# Patient Record
Sex: MALE | Race: WHITE | NOT HISPANIC OR LATINO | Employment: OTHER | URBAN - METROPOLITAN AREA
[De-identification: names, ages, dates, MRNs, and addresses within clinical notes are randomized per-mention and may not be internally consistent; named-entity substitution may affect disease eponyms.]

---

## 2018-05-25 ENCOUNTER — OFFICE VISIT (OUTPATIENT)
Dept: FAMILY MEDICINE | Facility: CLINIC | Age: 58
End: 2018-05-25
Payer: COMMERCIAL

## 2018-05-25 VITALS
DIASTOLIC BLOOD PRESSURE: 74 MMHG | OXYGEN SATURATION: 97 % | TEMPERATURE: 97.8 F | WEIGHT: 187.4 LBS | BODY MASS INDEX: 25.38 KG/M2 | SYSTOLIC BLOOD PRESSURE: 132 MMHG | HEART RATE: 90 BPM | HEIGHT: 72 IN

## 2018-05-25 DIAGNOSIS — E78.5 HYPERLIPIDEMIA LDL GOAL <100: ICD-10-CM

## 2018-05-25 DIAGNOSIS — E11.40 TYPE 2 DIABETES MELLITUS WITH DIABETIC NEUROPATHY, WITHOUT LONG-TERM CURRENT USE OF INSULIN (H): Primary | ICD-10-CM

## 2018-05-25 DIAGNOSIS — N52.1 ERECTILE DYSFUNCTION DUE TO DISEASES CLASSIFIED ELSEWHERE: ICD-10-CM

## 2018-05-25 DIAGNOSIS — Z12.5 SPECIAL SCREENING FOR MALIGNANT NEOPLASM OF PROSTATE: ICD-10-CM

## 2018-05-25 DIAGNOSIS — I10 BENIGN ESSENTIAL HYPERTENSION: ICD-10-CM

## 2018-05-25 PROCEDURE — 99214 OFFICE O/P EST MOD 30 MIN: CPT | Performed by: FAMILY MEDICINE

## 2018-05-25 RX ORDER — AMLODIPINE AND BENAZEPRIL HYDROCHLORIDE 5; 20 MG/1; MG/1
CAPSULE ORAL
Qty: 90 CAPSULE | Refills: 3 | Status: SHIPPED | OUTPATIENT
Start: 2018-05-25 | End: 2019-06-30

## 2018-05-25 RX ORDER — GLIMEPIRIDE 1 MG/1
1 TABLET ORAL DAILY
Refills: 3 | COMMUNITY
Start: 2018-03-15 | End: 2018-05-25

## 2018-05-25 RX ORDER — AMLODIPINE AND BENAZEPRIL HYDROCHLORIDE 5; 20 MG/1; MG/1
CAPSULE ORAL
Refills: 1 | COMMUNITY
Start: 2018-03-15 | End: 2018-05-25

## 2018-05-25 RX ORDER — SITAGLIPTIN AND METFORMIN HYDROCHLORIDE 1000; 50 MG/1; MG/1
1 TABLET, FILM COATED ORAL 2 TIMES DAILY WITH MEALS
Qty: 180 TABLET | Refills: 3 | Status: SHIPPED | OUTPATIENT
Start: 2018-05-25 | End: 2019-06-30

## 2018-05-25 RX ORDER — ATORVASTATIN CALCIUM 80 MG/1
80 TABLET, FILM COATED ORAL EVERY MORNING
Refills: 1 | COMMUNITY
Start: 2018-03-15 | End: 2018-05-25

## 2018-05-25 RX ORDER — SITAGLIPTIN AND METFORMIN HYDROCHLORIDE 1000; 50 MG/1; MG/1
1 TABLET, FILM COATED ORAL 2 TIMES DAILY WITH MEALS
Refills: 0 | COMMUNITY
Start: 2018-01-17 | End: 2018-05-25

## 2018-05-25 RX ORDER — SILDENAFIL CITRATE 100 MG
TABLET ORAL
Refills: 5 | COMMUNITY
Start: 2017-12-15 | End: 2018-05-25

## 2018-05-25 RX ORDER — GLIMEPIRIDE 1 MG/1
1 TABLET ORAL DAILY
Qty: 90 TABLET | Refills: 3 | Status: SHIPPED | OUTPATIENT
Start: 2018-05-25 | End: 2019-07-02

## 2018-05-25 RX ORDER — TADALAFIL 10 MG/1
10 TABLET ORAL DAILY PRN
Qty: 12 TABLET | Refills: 3 | Status: SHIPPED | OUTPATIENT
Start: 2018-05-25 | End: 2018-12-26

## 2018-05-25 RX ORDER — ATORVASTATIN CALCIUM 80 MG/1
80 TABLET, FILM COATED ORAL EVERY MORNING
Qty: 90 TABLET | Refills: 3 | Status: SHIPPED | OUTPATIENT
Start: 2018-05-25 | End: 2019-06-30

## 2018-05-25 ASSESSMENT — PAIN SCALES - GENERAL: PAINLEVEL: NO PAIN (0)

## 2018-05-25 NOTE — MR AVS SNAPSHOT
After Visit Summary   5/25/2018    Amanuel Mccord    MRN: [id number]           Patient Information     Date Of Birth          1960        Visit Information        Provider Department      5/25/2018 4:30 PM Schoen, Gregory G, MD [facility]        Today's Diagnoses     Type 2 diabetes mellitus with diabetic neuropathy, without long-term current use of insulin (H)    -  1    Hyperlipidemia LDL goal <100        Benign essential hypertension        Erectile dysfunction due to diseases classified elsewhere        Special screening for malignant neoplasm of prostate           Follow-ups after your visit        Future tests that were ordered for you today     Open Future Orders        Priority Expected Expires Ordered    Lipid panel reflex to direct LDL Fasting Routine 4/25/2019 5/25/2019 5/25/2018    **A1C FUTURE 3mo Routine 8/23/2018 9/22/2018 5/25/2018    Albumin Random Urine Quantitative with Creat Ratio Routine 4/25/2019 5/25/2019 5/25/2018    Basic metabolic panel Routine  5/25/2019 5/25/2018    **ALT FUTURE 1yr Routine 4/25/2019 5/25/2019 5/25/2018    **TSH with free T4 reflex FUTURE 1yr Routine 4/25/2019 5/25/2019 5/25/2018            Who to contact     If you have questions or need follow up information about today's clinic visit or your schedule please contact [facility] directly at [phone].  Normal or non-critical lab and imaging results will be communicated to you by MyChart, letter or phone within 4 business days after the clinic has received the results. If you do not hear from us within 7 days, please contact the clinic through MyChart or phone. If you have a critical or abnormal lab result, we will notify you by phone as soon as possible.  Submit refill requests through [employer] or call your pharmacy and they will forward the refill request to us. Please allow 3 business days for your refill to be completed.          Additional Information About Your  Visit        Care EveryWhere ID     This is your Care EveryWhere ID. This could be used by other organizations to access your [place] medical records  [id number]        Your Vitals Were     Pulse Temperature Height Pulse Oximetry BMI (Body Mass Index)       90 97.8  F (36.6  C) (Temporal) 6' (1.829 m) 97% 25.42 kg/m2        Blood Pressure from Last 3 Encounters:   05/25/18 132/74    Weight from Last 3 Encounters:   05/25/18 187 lb 6.4 oz (85 kg)              We Performed the Following     Prostate spec antigen screen          Today's Medication Changes          These changes are accurate as of 5/25/18  6:15 PM.  If you have any questions, ask your nurse or doctor.               Start taking these medicines.        Dose/Directions    tadalafil 10 MG tablet   Commonly known as:  CIALIS   Used for:  Erectile dysfunction due to diseases classified elsewhere   Started by:  Schoen, Gregory G, MD        Dose:  10 mg   Take 1 tablet (10 mg) by mouth daily as needed prior to sex. Do not use with nitroglycerin, terazosin or doxazosin.   Quantity:  12 tablet   Refills:  3         Stop taking these medicines if you haven't already. Please contact your care team if you have questions.     VIAGRA 100 MG tablet   Generic drug:  sildenafil   Stopped by:  Schoen, Gregory G, MD                Where to get your medicines      These medications were sent to [address]  [address] [facility] [address]     Phone:  [phone]     amLODIPine-benazepril 5-20 MG per capsule    atorvastatin 80 MG tablet    glimepiride 1 MG tablet    JANUMET  MG per tablet    tadalafil 10 MG tablet                Primary Care Provider Office Phone # Fax #    Gregory G Schoen, [phone]       [address] [place] [address]        Equal Access to Services     KAREN HERNANDEZ AH: Noelle Lindsey, waaxda luqadaha, qaybta kaalmarya gloria, true hidalgo  laregla diaz. So [facility] [phone].    ATENCIÓN: Si habla wilbert, tiene a albarado disposición servicios gratuitos de asistencia lingüística. Gorge al [phone].    We comply with applicable federal civil rights laws and [place] laws. We do not discriminate on the basis of race, color, national origin, age, disability, sex, sexual orientation, or gender identity.            Thank you!     Thank you for choosing [facility]  for your care. Our goal is always to provide you with excellent care. Hearing back from our patients is one way we can continue to improve our services. Please take a few minutes to complete the written survey that you may receive in the mail after your visit with us. Thank you!             Your Updated Medication List - Protect others around you: Learn how to safely use, store and throw away your medicines at www.disposemymeds.org.          This list is accurate as of 5/25/18  6:15 PM.  Always use your most recent med list.                   Brand Name Dispense Instructions for use Diagnosis    amLODIPine-benazepril 5-20 MG per capsule    LOTREL    90 capsule    TAKE ONE CAPSULE BY MOUTH EVERY MORNING    Type 2 diabetes mellitus with diabetic neuropathy, without long-term current use of insulin (H)       atorvastatin 80 MG tablet    LIPITOR    90 tablet    Take 1 tablet (80 mg) by mouth every morning    Type 2 diabetes mellitus with diabetic neuropathy, without long-term current use of insulin (H)       glimepiride 1 MG tablet    AMARYL    90 tablet    Take 1 tablet (1 mg) by mouth daily    Type 2 diabetes mellitus with diabetic neuropathy, without long-term current use of insulin (H)       JANUMET  MG per tablet   Generic drug:  sitagliptin-metFORMIN     180 tablet    Take 1 tablet by mouth 2 times daily (with meals)    Type 2 diabetes mellitus with diabetic neuropathy, without long-term current use of insulin (H)       tadalafil 10 MG tablet    CIALIS    12 tablet    Take 1  tablet (10 mg) by mouth daily as needed prior to sex. Do not use with nitroglycerin, terazosin or doxazosin.    Erectile dysfunction due to diseases classified elsewhere

## 2018-05-25 NOTE — PROGRESS NOTES
"  SUBJECTIVE:   Amanuel Mccord is a 57 year old male who presents to clinic today for the following health issues:     Amanuel is seeing patients me this clinic transferring over from [facility] due to a change in insurance and the fact that they have been caring for his spouse years.  He states he was diagnosed with diabetes 2 years ago has been following up every 3-6 months with his provider in [place].  He denies any issues with low sugars on his current medications.  His last lab work that we can find through care everywhere were done on 8-2-17 and showed the following: A1c equals 6.7%, total cholesterol 145, HDL 60, LDL 61, TSH 1.28 and GFR greater than 60.  His only symptom that he describes now are on occasion he will have tingling in his toes and he has a history of some cross his fingers and toes which typically only cause symptoms in cold weather.  More recently he has been having some of the same tingling.  He does note that for period of 2 weeks he did not take his medication and then for the past 2 weeks have restarted them feeling that it would be best to be on his medications what he meant to see me so created a gap find that out.  He also notes he has intermittent difficulty maintaining erection has used Viagra successfully in the past.  He understands that this is no longer on his formulary and would like alternative consideration.  When necessary he required 100 mg of Viagra.    Diabetes Follow-up      Patient is checking blood sugars: not at all    Diabetic concerns: Tingly in toes     Symptoms of hypoglycemia (low blood sugar): none     Paresthesias (numbness or burning in feet) or sores: Yes      Date of last diabetic eye exam: a year ago    Hypertension Follow-up      Outpatient blood pressures are being checked at [facility].  Results \"tend to be high\".    Low Salt Diet: no added salt    BP Readings from Last 2 Encounters:   05/25/18 132/74     No results found for: A1C, LDL    Amount of " exercise or physical activity: 6-7 days/week     Problems taking medications regularly: No    Medication side effects: none    Diet: regular (no restrictions)    Current Outpatient Prescriptions   Medication Sig Dispense Refill     amLODIPine-benazepril (LOTREL) 5-20 MG per capsule TAKE ONE CAPSULE BY MOUTH EVERY MORNING 90 capsule 3     atorvastatin (LIPITOR) 80 MG tablet Take 1 tablet (80 mg) by mouth every morning 90 tablet 3     glimepiride (AMARYL) 1 MG tablet Take 1 tablet (1 mg) by mouth daily 90 tablet 3     JANUMET  MG per tablet Take 1 tablet by mouth 2 times daily (with meals) 180 tablet 3     tadalafil (CIALIS) 10 MG tablet Take 1 tablet (10 mg) by mouth daily as needed prior to sex. Do not use with nitroglycerin, terazosin or doxazosin. 12 tablet 3     VIAGRA 100 MG tablet TAKE ONE TABLET BY MOUTH ONCE DAILY AS NEEDED FOR ERECTILE DYSFUNCTION  5     [DISCONTINUED] amLODIPine-benazepril (LOTREL) 5-20 MG per capsule TAKE ONE CAPSULE BY MOUTH EVERY MORNING  1     [DISCONTINUED] atorvastatin (LIPITOR) 80 MG tablet Take 80 mg by mouth every morning  1     [DISCONTINUED] glimepiride (AMARYL) 1 MG tablet Take 1 mg by mouth daily  3     [DISCONTINUED] JANUMET  MG per tablet Take 1 tablet by mouth 2 times daily (with meals)  0     ROS:  Const; denies any weight loss or gain, no fevers chills or night sweats.  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  His last diabetic eye exam was over a year ago.  He has prescription glasses but rarely wears them, rather using cheaters for reading as necessary.  RESP: Negative  CVR: Negative  GI: No bowel issues and no symptoms to suggest hernia.  States he did the colon screening lab test that was normal last year.  MSK: neg  Neuro: tingling in toes.    OBJECTIVE:  /74 (Cuff Size: Adult Regular)  Pulse 90  Temp 97.8  F (36.6  C) (Temporal)  Ht 6' (1.829 m)  Wt 187  lb 6.4 oz (85 kg)  SpO2 97%  BMI 25.42 kg/m2  Alert and oriented, in no acute distress.  PERRL, EOMI, fundi are clear. TMs, nose, throat are all normal.  Left eye is with exotropia.  The neck is supple and free of adenopathy or masses, the thyroid is normal without enlargement or nodules.  Chest is clear to auscultation.  Heart is regular without murmurs, clicks or rubs.   Diabetic foot exam showed normal pulses, normal capillary refill and normal monofilament exam on both feet.  There were no open sores or lesions noted.    Outside labs from [facility]:  Hepatitis C screen negative  Diabetic labs including lipid profile as above.  Does not appear to have had prostate screening in the past.  I was unable to identify any stool fit test results.    ASSESSMENT:     Type 2 diabetes mellitus with diabetic neuropathy, without long-term current use of insulin (H)  Hyperlipidemia LDL goal <100  Benign essential hypertension  Erectile dysfunction due to diseases classified elsewhere  Special screening for malignant neoplasm of prostate    PLAN:  Discussed the plan for management of his diabetes.  I placed new prescription orders for all of his current diabetes medications as noted above and sent these to [facility] (per his request).  I will have him return fasting month for his routine diabetes labs in order to give him a full 6 weeks back on his medication before testing his A1c.  We discussed the importance of foot protection and management.  Will review his records further to see if he has had any recent cancer screening and address that.  We will contact me if a trial of Cialis is ineffective or if a higher dose is indicated.  He otherwise requests refills as needed that if it is effective for him.  Depending on his lab results and how well his diabetes is controlled in a month we will determine if I need to see him every 6 months or more frequently.    Electronically signed by Greg Schoen, MD

## 2018-07-03 DIAGNOSIS — Z12.5 SPECIAL SCREENING FOR MALIGNANT NEOPLASM OF PROSTATE: ICD-10-CM

## 2018-07-03 DIAGNOSIS — E78.5 HYPERLIPIDEMIA LDL GOAL <100: ICD-10-CM

## 2018-07-03 DIAGNOSIS — I10 BENIGN ESSENTIAL HYPERTENSION: ICD-10-CM

## 2018-07-03 DIAGNOSIS — E11.40 TYPE 2 DIABETES MELLITUS WITH DIABETIC NEUROPATHY, WITHOUT LONG-TERM CURRENT USE OF INSULIN (H): ICD-10-CM

## 2018-07-03 LAB
ANION GAP SERPL CALCULATED.3IONS-SCNC: 6 MMOL/L (ref 3–14)
BUN SERPL-MCNC: 15 MG/DL (ref 7–30)
CALCIUM SERPL-MCNC: 9.4 MG/DL (ref 8.5–10.1)
CHLORIDE SERPL-SCNC: 103 MMOL/L (ref 94–109)
CHOLEST SERPL-MCNC: 165 MG/DL
CO2 SERPL-SCNC: 29 MMOL/L (ref 20–32)
CREAT SERPL-MCNC: 0.68 MG/DL (ref 0.66–1.25)
CREAT UR-MCNC: 59 MG/DL
GFR SERPL CREATININE-BSD FRML MDRD: >90 ML/MIN/1.7M2
GLUCOSE SERPL-MCNC: 119 MG/DL (ref 70–99)
HBA1C MFR BLD: 6.7 % (ref 0–5.6)
HDLC SERPL-MCNC: 82 MG/DL
LDLC SERPL CALC-MCNC: 63 MG/DL
MICROALBUMIN UR-MCNC: 22 MG/L
MICROALBUMIN/CREAT UR: 37.67 MG/G CR (ref 0–17)
NONHDLC SERPL-MCNC: 83 MG/DL
POTASSIUM SERPL-SCNC: 4.9 MMOL/L (ref 3.4–5.3)
PSA SERPL-ACNC: 2.18 UG/L (ref 0–4)
SODIUM SERPL-SCNC: 138 MMOL/L (ref 133–144)
TRIGL SERPL-MCNC: 102 MG/DL
TSH SERPL DL<=0.005 MIU/L-ACNC: 0.96 MU/L (ref 0.4–4)

## 2018-07-03 PROCEDURE — 83036 HEMOGLOBIN GLYCOSYLATED A1C: CPT | Mod: QW | Performed by: FAMILY MEDICINE

## 2018-07-03 PROCEDURE — 84443 ASSAY THYROID STIM HORMONE: CPT | Performed by: FAMILY MEDICINE

## 2018-07-03 PROCEDURE — G0103 PSA SCREENING: HCPCS | Performed by: FAMILY MEDICINE

## 2018-07-03 PROCEDURE — 82043 UR ALBUMIN QUANTITATIVE: CPT | Performed by: FAMILY MEDICINE

## 2018-07-03 PROCEDURE — 80048 BASIC METABOLIC PNL TOTAL CA: CPT | Performed by: FAMILY MEDICINE

## 2018-07-03 PROCEDURE — 36415 COLL VENOUS BLD VENIPUNCTURE: CPT | Performed by: FAMILY MEDICINE

## 2018-07-03 PROCEDURE — 80061 LIPID PANEL: CPT | Performed by: FAMILY MEDICINE

## 2018-07-05 ENCOUNTER — TELEPHONE (OUTPATIENT)
Dept: FAMILY MEDICINE | Facility: CLINIC | Age: 58
End: 2018-07-05

## 2018-07-05 NOTE — TELEPHONE ENCOUNTER
----- Message from Gregory G Schoen, MD sent at 7/4/2018  8:49 AM CDT -----  Please inform Amanuel that his labs all look very good.  The one minor abnormality is a slight increase in urine albumin, but we will monitor that and not need to make any changes in his current medications.  The amlodipine-benazepril medication he is taking protects his kidneys so is getting proper treatment for this.  His thyroid function was normal, blood tests for kidney and liver were normal, cholesterol/lipids are well controlled and his PSA screen for prostate cancer was normal.  His diabetes is well controlled with his A1c being at 6.7% with a goal of less than 7.  No changes are needed.  I should see him every 6 months to monitor his diabetes and medications.  Electronically signed by Greg Schoen, MD

## 2018-12-26 ENCOUNTER — OFFICE VISIT (OUTPATIENT)
Dept: FAMILY MEDICINE | Facility: CLINIC | Age: 58
End: 2018-12-26
Payer: COMMERCIAL

## 2018-12-26 VITALS
DIASTOLIC BLOOD PRESSURE: 82 MMHG | BODY MASS INDEX: 27.26 KG/M2 | SYSTOLIC BLOOD PRESSURE: 120 MMHG | RESPIRATION RATE: 14 BRPM | WEIGHT: 201 LBS | HEART RATE: 100 BPM | OXYGEN SATURATION: 96 % | TEMPERATURE: 97.7 F

## 2018-12-26 DIAGNOSIS — I10 BENIGN ESSENTIAL HYPERTENSION: ICD-10-CM

## 2018-12-26 DIAGNOSIS — E11.40 TYPE 2 DIABETES MELLITUS WITH DIABETIC NEUROPATHY, WITHOUT LONG-TERM CURRENT USE OF INSULIN (H): Primary | ICD-10-CM

## 2018-12-26 DIAGNOSIS — N52.1 ERECTILE DYSFUNCTION DUE TO DISEASES CLASSIFIED ELSEWHERE: ICD-10-CM

## 2018-12-26 PROCEDURE — 99214 OFFICE O/P EST MOD 30 MIN: CPT | Performed by: FAMILY MEDICINE

## 2018-12-26 RX ORDER — SILDENAFIL 100 MG/1
100 TABLET, FILM COATED ORAL DAILY PRN
Qty: 4 TABLET | Refills: 3 | Status: SHIPPED | OUTPATIENT
Start: 2018-12-26 | End: 2020-02-07

## 2018-12-26 ASSESSMENT — PAIN SCALES - GENERAL: PAINLEVEL: NO PAIN (0)

## 2018-12-26 NOTE — PROGRESS NOTES
SUBJECTIVE:   Amanuel Mccord is a 58 year old male who presents to clinic today for the following health issues:    Diabetes Follow-up      Patient is checking blood sugars: rarely.  hasnt done it in the last few days    Diabetic concerns: None     Symptoms of hypoglycemia (low blood sugar): none     Paresthesias (numbness or burning in feet) or sores: No     Date of last diabetic eye exam:     BP Readings from Last 2 Encounters:   12/26/18 120/82   05/25/18 132/74     Hemoglobin A1C (%)   Date Value   07/03/2018 6.7 (H)   08/02/2017 6.7 (H)     LDL Cholesterol Calculated (mg/dL)   Date Value   07/03/2018 63   04/04/2017 61       Diabetes Management Resources    Amount of exercise or physical activity: 6-7 days/week for an average of 30-45 minutes    Problems taking medications regularly: No    Medication side effects: none    Diet: diabetic        PROBLEMS TO ADD ON...  Personal issue to discuss with PCP    Amanuel is here for diabetes and hypertension follow up. He states he his taking his meds as prescribed and finds no issues with side effects, other than wonders about medication contribution to erectile dysfunction and treatment of that.  He has been using up to 40 mg of cialis and not always getting the response he would like. He believes that he got better results from viagra but that is not covered and was up to about $50/pill for that to buy out of pocket. He several times notes that he does sometimes worry about peformance and acknowledges that this might also be playing into things. I asked him about stress and he feels that this is not a big problem for him. He is on the road a lot and mostly sees his wife on weekends and therefore there may be stress in that relationship and may not be forthcoming about that. Of note, his wife did see me last week for a routine visit and did express concern about him being very stressed lately and did not want me to mention that but asked that I explore it if the  conversation led to stress related issues.     He also notes that he is evolving a bit of a curvature of the tip of his penis when he gets an erection and from time to time will have some ejaculatory dysfunction.      Current Outpatient Medications   Medication Sig Dispense Refill     amLODIPine-benazepril (LOTREL) 5-20 MG per capsule TAKE ONE CAPSULE BY MOUTH EVERY MORNING 90 capsule 3     atorvastatin (LIPITOR) 80 MG tablet Take 1 tablet (80 mg) by mouth every morning 90 tablet 3     glimepiride (AMARYL) 1 MG tablet Take 1 tablet (1 mg) by mouth daily 90 tablet 3     JANUMET  MG per tablet Take 1 tablet by mouth 2 times daily (with meals) 180 tablet 3     sildenafil (VIAGRA) 100 MG tablet Take 1 tablet (100 mg) by mouth daily as needed 4 tablet 3     Social History     Socioeconomic History     Marital status:      Spouse name: None     Number of children: None     Years of education: None     Highest education level: None   Social Needs     Financial resource strain: None     Food insecurity - worry: None     Food insecurity - inability: None     Transportation needs - medical: None     Transportation needs - non-medical: None   Occupational History     None   Tobacco Use     Smoking status: Current Every Day Smoker     Types: Cigarettes     Smokeless tobacco: Never Used   Substance and Sexual Activity     Alcohol use: Yes     Drug use: No     Sexual activity: Yes   Other Topics Concern     Parent/sibling w/ CABG, MI or angioplasty before 65F 55M? Not Asked   Social History Narrative     None     ROS:  Const: neg  HEENT: neg  RESP: neg  CVR: neg  GI: neg  : as above  MSK: neg  Psych: denies stress, depression    OBJECTIVE:  /82 (BP Location: Left arm, Patient Position: Sitting, Cuff Size: Adult Regular)   Pulse 100   Temp 97.7  F (36.5  C) (Temporal)   Resp 14   Wt 91.2 kg (201 lb)   SpO2 96%   BMI 27.26 kg/m    Alert and oriented, in no acute distress.  PERRL, EOMI, fundi are clear.    The neck is supple and free of adenopathy or masses, the thyroid is normal without enlargement or nodules.  Chest is clear to auscultation.  Heart is regular without murmurs, clicks or rubs.   Abdomen soft, nontender, no masses  Extremities without edema.   MENTAL STATUS EXAM  Appearance: appropriate  Attitude: cooperative  Behavior: normal to being a bit manic/hyper with rapid speech  Eye Contact: normal  Speech: normal  Orientation: oriented to person , place, time   Mood:  normal  Affect: normal  Thought Process: clear    ASSESSMENT:     Type 2 diabetes mellitus with diabetic neuropathy, without long-term current use of insulin (H)  Benign essential hypertension  Erectile dysfunction due to diseases classified elsewhere      PLAN:  He will return fasting for blood work to monitor labs due for DM at this time.   In July he had a PSA done which was normal range at 2.18 and other labs were unremarkable at that time. His blood pressure was stable and no med changes are indicated.  We discussed options for ED, including paying out of pocket for viagra, trial of levitra, referral to urology and he would like a small Rx for viagra to see if works better.  We discussed that having diabetes and hypertension themselves can lead to ED, as well as the treatments we use for them, so at this point the recommendation is to manage his DM and HTN optimally and deal with the ED.  If still has issues despite viagra, particularly with the ejaculatory dysfunction, could consider a trial of bupropion.    Electronically signed by Greg Schoen, MD

## 2019-03-11 ENCOUNTER — TELEPHONE (OUTPATIENT)
Dept: FAMILY MEDICINE | Facility: CLINIC | Age: 59
End: 2019-03-11

## 2019-03-11 NOTE — LETTER
[address] [id number] [phone]        Amanuel CORTEZ Suri  [address] [address] [place] [id number]      March 11, 2019      Dear Amanuel,    I care about your health and have reviewed your health plan, including your medical conditions, medication list, and lab results and am making recommendations based on this review, to better manage your health.    You are in particular need of attention regarding:  -Diabetes  -Colon Cancer Screening    I am recommending that you:  -schedule a LAB ONLY APPOINTMENT to recheck your: diabetic labs within the next 1-4 weeks.  -schedule a COLONOSCOPY.  Colon cancer is now the second leading cause of cancer-related deaths in the [place] for both men and women.  There are over 130,000 new cases and 50,000 deaths per year from colon cancer.  A recent study, which included patients ages 55 to 79 found 50,400 [place] deaths from colorectal cancer could have been prevented if patients had undergone a colonoscopy in the previous 10 years.    If you have not had a colonoscopy, we encourage you to schedule by contacting us at [phone], Monday through Friday.  After hours, you may leave a message and we will return your call during normal business hours.      There is another option called a FIT test, if you don t wish to have a colonoscopy, which needs to be repeated every year.  It does replace the colonoscopy for colorectal cancer screening and can detect hidden bleeding in the lower colon.  If a positive result is obtained, you would be referred for a colonoscopy. Please discuss this option with your provider.      For patients under/uninsured, we recommend you contact the [employer] program. [employer] is a free colorectal cancer screening program that provides colonoscopies for eligible under/uninsured [place] men and women. If you are interested in receiving a free colonoscopy, please call [facility] at  [phone] (mention code [employer]) to see if you re eligible.     If you've had the preventative screening completed at another facility or feel you're not due for this screening, please call our clinic at the number listed above or send us a My Chart message so we can update our records. We would like to thank you in advance for taking the time to take care of your health.  If you have any questions, please don t hesitate to contact our clinic.    Sincerely,       Your [facility] Team

## 2019-03-11 NOTE — TELEPHONE ENCOUNTER
Panel Management Review      Patient has the following on his problem list:     Diabetes    ASA: Failed    Last A1C  Lab Results   Component Value Date    A1C 6.7 07/03/2018    A1C 6.7 08/02/2017    A1C 9.7 04/04/2017    A1C 8.0 12/06/2016    A1C 10.0 04/23/2015     A1C tested: Passed    Last LDL:    Lab Results   Component Value Date    CHOL 165 07/03/2018     Lab Results   Component Value Date    HDL 82 07/03/2018     Lab Results   Component Value Date    LDL 63 07/03/2018     Lab Results   Component Value Date    TRIG 102 07/03/2018     No results found for: CHOLHDLRATIO  Lab Results   Component Value Date    NHDL 83 07/03/2018       Is the patient on a Statin? YES             Is the patient on Aspirin? NO    Medications     HMG CoA Reductase Inhibitors     atorvastatin (LIPITOR) 80 MG tablet             Last three blood pressure readings:  BP Readings from Last 3 Encounters:   12/26/18 120/82   05/25/18 132/74       Date of last diabetes office visit: 12/26/2018     Tobacco History:     History   Smoking Status     Current Every Day Smoker     Types: Cigarettes   Smokeless Tobacco     Never Used         Hypertension   Last three blood pressure readings:  BP Readings from Last 3 Encounters:   12/26/18 120/82   05/25/18 132/74     Blood pressure: Passed    HTN Guidelines:  Age 18-59 BP range:  Less than 140/90  Age 60-85 with Diabetes:  Less than 140/90  Age 60-85 without Diabetes:  less than 150/90      Composite cancer screening  Chart review shows that this patient is due/due soon for the following Colonoscopy  Summary:    Patient is due/failing the following:   A1C, COLONOSCOPY and PHYSICAL    Action needed:   Patient needs office visit for physical. and Patient needs non-fasting lab only appointment. Patient needs a referral for a colonoscopy    Type of outreach:    Sent letter.    Questions for provider review:    None                                                                                                                                     Alicja Parson CMA ([facility]ONY)       Chart routed to Care Team .

## 2019-04-19 DIAGNOSIS — E78.5 HYPERLIPIDEMIA LDL GOAL <100: ICD-10-CM

## 2019-04-19 DIAGNOSIS — I10 BENIGN ESSENTIAL HYPERTENSION: ICD-10-CM

## 2019-04-19 DIAGNOSIS — E11.40 TYPE 2 DIABETES MELLITUS WITH DIABETIC NEUROPATHY, WITHOUT LONG-TERM CURRENT USE OF INSULIN (H): ICD-10-CM

## 2019-04-19 LAB
ALT SERPL W P-5'-P-CCNC: 39 U/L (ref 0–70)
ANION GAP SERPL CALCULATED.3IONS-SCNC: 6 MMOL/L (ref 3–14)
BUN SERPL-MCNC: 17 MG/DL (ref 7–30)
CALCIUM SERPL-MCNC: 8.7 MG/DL (ref 8.5–10.1)
CHLORIDE SERPL-SCNC: 105 MMOL/L (ref 94–109)
CO2 SERPL-SCNC: 27 MMOL/L (ref 20–32)
CREAT SERPL-MCNC: 0.77 MG/DL (ref 0.66–1.25)
CREAT UR-MCNC: 116 MG/DL
GFR SERPL CREATININE-BSD FRML MDRD: >90 ML/MIN/{1.73_M2}
GLUCOSE SERPL-MCNC: 120 MG/DL (ref 70–99)
HBA1C MFR BLD: 6.9 % (ref 0–5.6)
MICROALBUMIN UR-MCNC: 28 MG/L
MICROALBUMIN/CREAT UR: 24.4 MG/G CR (ref 0–17)
POTASSIUM SERPL-SCNC: 4.3 MMOL/L (ref 3.4–5.3)
SODIUM SERPL-SCNC: 138 MMOL/L (ref 133–144)

## 2019-04-19 PROCEDURE — 83036 HEMOGLOBIN GLYCOSYLATED A1C: CPT | Mod: QW | Performed by: FAMILY MEDICINE

## 2019-04-19 PROCEDURE — 82043 UR ALBUMIN QUANTITATIVE: CPT | Performed by: FAMILY MEDICINE

## 2019-04-19 PROCEDURE — 36415 COLL VENOUS BLD VENIPUNCTURE: CPT | Performed by: FAMILY MEDICINE

## 2019-04-19 PROCEDURE — 84460 ALANINE AMINO (ALT) (SGPT): CPT | Performed by: FAMILY MEDICINE

## 2019-04-19 PROCEDURE — 80048 BASIC METABOLIC PNL TOTAL CA: CPT | Performed by: FAMILY MEDICINE

## 2019-04-24 ENCOUNTER — TELEPHONE (OUTPATIENT)
Dept: FAMILY MEDICINE | Facility: CLINIC | Age: 59
End: 2019-04-24

## 2019-04-24 NOTE — TELEPHONE ENCOUNTER
----- Message from Gregory G Schoen, MD sent at 4/24/2019  5:28 PM CDT -----  Please inform Amanuel that has labs turned out well. His urine protein has improved and gone down from 37.67 to 24.4, which is just above normal of 17.  His kidney function, sodium/potassium/calcium were all normal and his blood sugar was only 120, with his A1c at 6.9% with target of being under 7.  No changes in his meds indicated and recommend a follow up in 6 months.   Electronically signed by Greg Schoen, MD

## 2019-06-30 DIAGNOSIS — E11.40 TYPE 2 DIABETES MELLITUS WITH DIABETIC NEUROPATHY, WITHOUT LONG-TERM CURRENT USE OF INSULIN (H): ICD-10-CM

## 2019-07-01 RX ORDER — SITAGLIPTIN AND METFORMIN HYDROCHLORIDE 1000; 50 MG/1; MG/1
TABLET, FILM COATED ORAL
Qty: 180 TABLET | Refills: 0 | Status: SHIPPED | OUTPATIENT
Start: 2019-07-01 | End: 2019-12-13

## 2019-07-01 RX ORDER — ATORVASTATIN CALCIUM 80 MG/1
TABLET, FILM COATED ORAL
Qty: 90 TABLET | Refills: 0 | Status: SHIPPED | OUTPATIENT
Start: 2019-07-01 | End: 2019-10-08

## 2019-07-01 RX ORDER — AMLODIPINE AND BENAZEPRIL HYDROCHLORIDE 5; 20 MG/1; MG/1
CAPSULE ORAL
Qty: 90 CAPSULE | Refills: 0 | Status: SHIPPED | OUTPATIENT
Start: 2019-07-01 | End: 2019-10-07

## 2019-07-01 NOTE — TELEPHONE ENCOUNTER
Juan Deigoumet  Last Written Prescription Date:  05/25/2018  Last Fill Quantity: 180,  # refills: 3   Last office visit: 12/26/2018 with prescribing provider:  Schoen Future Office Visit:   Next 5 appointments (look out 90 days)    Jul 12, 2019  2:50 PM CDT  Office Visit with Gregory G Schoen, MD  [address] [address]  [phone]      Prescription approved per [facility] Refill Protocol.    Lotrel  Last Written Prescription Date:  05/25/2018  Last Fill Quantity: 90,  # refills: 3   Last office visit: 12/26/2018 with prescribing provider:  Schoen Future Office Visit:   Next 5 appointments (look out 90 days)    Jul 12, 2019  2:50 PM CDT  Office Visit with Gregory G Schoen, MD  [facility] ([address] [address]  [phone]      Prescription approved per [facility] Refill Protocol.    Lipitor  Last Written Prescription Date:  05/25/2018  Last Fill Quantity: 90,  # refills: 3   Last office visit: 12/26/2018 with prescribing provider:  Schoen Future Office Visit:   Next 5 appointments (look out 90 days)    Jul 12, 2019  2:50 PM CDT  Office Visit with Gregory G Schoen, MD  [facility] ([address] [address]  [phone]      Prescription approved per [facility] Refill Protocol.    Requested Prescriptions   Pending Prescriptions Disp Refills     atorvastatin (LIPITOR) 80 MG tablet [Pharmacy Med Name: ATORVASTATIN CALCIUM 80MG TABS] 90 tablet 3     Sig: TAKE ONE TABLET BY MOUTH EVERY MORNING       Statins Protocol Passed - 6/30/2019 10:01 AM        Passed - LDL on file in past 12 months     Recent Labs   Lab Test 07/03/18  0859   LDL 63           Passed - No abnormal creatine kinase in past 12 months     No lab results found.         Passed - Recent (12 mo) or future (30 days) visit within the authorizing provider's specialty      "Patient had office visit in the last 12 months or has a visit in the next 30 days with authorizing provider or within the authorizing provider's specialty.  See \"Patient Info\" tab in inbasket, or \"Choose Columns\" in Meds & Orders section of the refill encounter.          Passed - Medication is active on med list        Passed - Patient is age 18 or older        amLODIPine-benazepril (LOTREL) 5-20 MG capsule [Pharmacy Med Name: AMLODIPINE BESY-BENAZEPRI 5-20 CAPS] 90 capsule 3     Sig: TAKE ONE CAPSULE BY MOUTH EVERY MORNING       Calcium Channel Blockers Protocol  Passed - 6/30/2019 10:01 AM        Passed - Blood pressure under 140/90 in past 12 months     BP Readings from Last 3 Encounters:   12/26/18 120/82   05/25/18 132/74           Passed - Recent (12 mo) or future (30 days) visit within the authorizing provider's specialty     Patient had office visit in the last 12 months or has a visit in the next 30 days with authorizing provider or within the authorizing provider's specialty.  See \"Patient Info\" tab in inbasket, or \"Choose Columns\" in Meds & Orders section of the refill encounter.          Passed - Medication is active on med list        Passed - Patient is age 18 or older        Passed - Normal serum creatinine on file in past 12 months     Recent Labs   Lab Test 04/19/19  0810   CR 0.77           JANUMET  MG tablet [Pharmacy Med Name: JANUMET 50-1000MG TABS] 180 tablet 3     Sig: TAKE ONE TABLET BY MOUTH TWICE A DAY WITH MEALS       Combination Oral Antihyperglycemic Agents Passed - 6/30/2019 10:01 AM        Passed - Blood pressure under 140/90 in past 12 months     BP Readings from Last 3 Encounters:   12/26/18 120/82   05/25/18 132/74           Passed - Patient has a documented LDL level within past 12 mos.     Recent Labs   Lab Test 07/03/18  0859   LDL 63           Passed - Patient has a documented Microalbumin level within past 12 mos.     Recent Labs   Lab Test 04/19/19  0816   MICROL 28 " "  UMALCR 24.40*           Passed - Patient has documented A1c within the specified period of time.     If HgbA1C is 8 or greater, it needs to be on file within the past 3 months.  If less than 8, must be on file within the past 6 months.   Recent Labs   Lab Test 04/19/19  0810   A1C 6.9*           Passed - Patient's CR is NOT>1.4 OR Patient's EGFR is NOT<45 within past 12 mos.     Recent Labs   Lab Test 04/19/19  0810   GFRESTIMATED >90   GFRESTBLACK >90     Recent Labs   Lab Test 04/19/19  0810   CR 0.77           Passed - Patient does not have a diagnosis of CHF.        Passed - Medication is active on med list        Passed - Patient is 18 years old or older.        Passed - Recent (6 mo) or future (30 days) visit within the authorizing provider's specialty     Patient had office visit in the last 6 months or has a visit in the next 30 days with authorizing provider or within the authorizing provider's specialty.  See \"Patient Info\" tab in inbasket, or \"Choose Columns\" in Meds & Orders section of the refill encounter.        Mary Ellen Greene RN   "

## 2019-07-02 DIAGNOSIS — E11.40 TYPE 2 DIABETES MELLITUS WITH DIABETIC NEUROPATHY, WITHOUT LONG-TERM CURRENT USE OF INSULIN (H): ICD-10-CM

## 2019-07-02 RX ORDER — GLIMEPIRIDE 1 MG/1
TABLET ORAL
Qty: 90 TABLET | Refills: 0 | Status: SHIPPED | OUTPATIENT
Start: 2019-07-02 | End: 2019-10-07

## 2019-07-02 NOTE — TELEPHONE ENCOUNTER
"Amaryl  Last Written Prescription Date:  05/25/2018  Last Fill Quantity: 90,  # refills: 3   Last office visit: 12/26/2018 with prescribing provider:  Schoen   Future Office Visit:   Next 5 appointments (look out 90 days)    Jul 12, 2019  2:50 PM CDT  Office Visit with Gregory G [facility]en, MD  [facility] [address] [place] [id number] [phone]      Prescription approved per [facility] Refill Protocol.    Requested Prescriptions   Pending Prescriptions Disp Refills     glimepiride (AMARYL) 1 MG tablet [Pharmacy Med Name: GLIMEPIRIDE 1MG TABS] 90 tablet 3     Sig: TAKE ONE TABLET BY MOUTH ONCE DAILY       Sulfonylurea Agents Failed - 7/2/2019 12:08 PM        Failed - Blood pressure less than 140/90 in past 6 months     BP Readings from Last 3 Encounters:   12/26/18 120/82   05/25/18 132/74           Passed - Patient has documented LDL within the past 12 mos.     Recent Labs   Lab Test 07/03/18  0859   LDL 63           Passed - Patient has had a Microalbumin in the past 15 mos.     Recent Labs   Lab Test 04/19/19  0816   MICROL 28   UMALCR 24.40*           Passed - Patient has documented A1c within the specified period of time.     If HgbA1C is 8 or greater, it needs to be on file within the past 3 months.  If less than 8, must be on file within the past 6 months.   Recent Labs   Lab Test 04/19/19  0810   A1C 6.9*           Passed - Medication is active on med list        Passed - Patient is age 18 or older        Passed - Patient has a recent creatinine (normal) within the past 12 mos.     Recent Labs   Lab Test 04/19/19  0810   CR 0.77           Passed - Recent (6 mo) or future (30 days) visit within the authorizing provider's specialty     Patient had office visit in the last 6 months or has a visit in the next 30 days with authorizing provider or within the authorizing provider's specialty.  See \"Patient Info\" tab in inbasket, or \"Choose Columns\" in Meds & " Orders section of the refill encounter.        Mary Ellen Greene RN

## 2019-07-12 ENCOUNTER — TELEPHONE (OUTPATIENT)
Dept: FAMILY MEDICINE | Facility: CLINIC | Age: 59
End: 2019-07-12

## 2019-07-12 DIAGNOSIS — E11.40 TYPE 2 DIABETES MELLITUS WITH DIABETIC NEUROPATHY, WITHOUT LONG-TERM CURRENT USE OF INSULIN (H): Primary | ICD-10-CM

## 2019-07-12 DIAGNOSIS — E11.40 TYPE 2 DIABETES MELLITUS WITH DIABETIC NEUROPATHY, WITHOUT LONG-TERM CURRENT USE OF INSULIN (H): ICD-10-CM

## 2019-07-12 LAB
HBA1C MFR BLD: 6.6 % (ref 0–5.6)
TSH SERPL DL<=0.005 MIU/L-ACNC: 0.85 MU/L (ref 0.4–4)

## 2019-07-12 PROCEDURE — 84443 ASSAY THYROID STIM HORMONE: CPT | Performed by: FAMILY MEDICINE

## 2019-07-12 PROCEDURE — 36415 COLL VENOUS BLD VENIPUNCTURE: CPT | Performed by: FAMILY MEDICINE

## 2019-07-12 PROCEDURE — 83036 HEMOGLOBIN GLYCOSYLATED A1C: CPT | Mod: QW | Performed by: FAMILY MEDICINE

## 2019-10-07 DIAGNOSIS — E11.40 TYPE 2 DIABETES MELLITUS WITH DIABETIC NEUROPATHY, WITHOUT LONG-TERM CURRENT USE OF INSULIN (H): ICD-10-CM

## 2019-10-07 RX ORDER — GLIMEPIRIDE 1 MG/1
TABLET ORAL
Qty: 90 TABLET | Refills: 0 | Status: SHIPPED | OUTPATIENT
Start: 2019-10-07 | End: 2019-12-13

## 2019-10-07 RX ORDER — AMLODIPINE AND BENAZEPRIL HYDROCHLORIDE 5; 20 MG/1; MG/1
CAPSULE ORAL
Qty: 90 CAPSULE | Refills: 0 | Status: SHIPPED | OUTPATIENT
Start: 2019-10-07 | End: 2019-12-13

## 2019-10-07 NOTE — TELEPHONE ENCOUNTER
"Glimepiride  Last Written Prescription Date:  07/02/2019  Last Fill Quantity: 90,  # refills: 0   Last office visit: 12/26/2018 with prescribing provider:  Schoen   Future Office Visit:   Next 5 appointments (look out 90 days)    Oct 25, 2019  4:10 PM CDT  Office Visit with Gregory G Schoen, MD  [facility] [address] [place] [address]  [phone]      Medication is being filled for 1 time refill only due to:  Upcoming appointment with PCP    Lotrel  Last Written Prescription Date:  07/01/2019  Last Fill Quantity: 90,  # refills: 0   Last office visit: 12/26/2018 with prescribing provider:  Schoen   Future Office Visit:   Next 5 appointments (look out 90 days)    Oct 25, 2019  4:10 PM CDT  Office Visit with Gregory G Schoen, MD  [facility] ([address] [address]  [phone]      Medication is being filled for 1 time refill only due to:  Upcoming appointment with PCP.     Requested Prescriptions   Pending Prescriptions Disp Refills     amLODIPine-benazepril (LOTREL) 5-20 MG capsule [Pharmacy Med Name: AMLODIPINE BESY-BENAZEPRI 5-20 CAPS] 90 capsule 0     Sig: TAKE ONE CAPSULE BY MOUTH EVERY MORNING       Calcium Channel Blockers Protocol  Passed - 10/7/2019  2:48 PM        Passed - Blood pressure under 140/90 in past 12 months     BP Readings from Last 3 Encounters:   12/26/18 120/82   05/25/18 132/74           Passed - Recent (12 mo) or future (30 days) visit within the authorizing provider's specialty     Patient has had an office visit with the authorizing provider or a provider within the authorizing providers department within the previous 12 mos or has a future within next 30 days. See \"Patient Info\" tab in inbasket, or \"Choose Columns\" in Meds & Orders section of the refill encounter.          Passed - Medication is active on med list        Passed - Patient is age 18 or older    " "    Passed - Normal serum creatinine on file in past 12 months     Recent Labs   Lab Test 04/19/19  0810   CR 0.77           glimepiride (AMARYL) 1 MG tablet [Pharmacy Med Name: GLIMEPIRIDE 1MG TABS] 90 tablet 0     Sig: TAKE ONE TABLET BY MOUTH ONCE DAILY       Sulfonylurea Agents Failed - 10/7/2019  2:48 PM        Failed - Blood pressure less than 140/90 in past 6 months     BP Readings from Last 3 Encounters:   12/26/18 120/82   05/25/18 132/74           Failed - Patient has documented LDL within the past 12 mos.     Recent Labs   Lab Test 07/03/18  0859   LDL 63           Passed - Patient has had a Microalbumin in the past 15 mos.     Recent Labs   Lab Test 04/19/19  0816   MICROL 28   UMALCR 24.40*           Passed - Patient has documented A1c within the specified period of time.     If HgbA1C is 8 or greater, it needs to be on file within the past 3 months.  If less than 8, must be on file within the past 6 months.   Recent Labs   Lab Test 07/12/19  1447   A1C 6.6*           Passed - Medication is active on med list        Passed - Patient is age 18 or older        Passed - Patient has a recent creatinine (normal) within the past 12 mos.     Recent Labs   Lab Test 04/19/19  0810   CR 0.77           Passed - Recent (6 mo) or future (30 days) visit within the authorizing provider's specialty     Patient had office visit in the last 6 months or has a visit in the next 30 days with authorizing provider or within the authorizing provider's specialty.  See \"Patient Info\" tab in inbasket, or \"Choose Columns\" in Meds & Orders section of the refill encounter.        Mary Ellen Greene RN   "

## 2019-10-08 DIAGNOSIS — E11.40 TYPE 2 DIABETES MELLITUS WITH DIABETIC NEUROPATHY, WITHOUT LONG-TERM CURRENT USE OF INSULIN (H): ICD-10-CM

## 2019-10-09 RX ORDER — ATORVASTATIN CALCIUM 80 MG/1
80 TABLET, FILM COATED ORAL EVERY MORNING
Qty: 90 TABLET | Refills: 0 | Status: SHIPPED | OUTPATIENT
Start: 2019-10-09 | End: 2019-12-13

## 2019-10-09 NOTE — TELEPHONE ENCOUNTER
"Lipitor  Last Written Prescription Date:  07/01/2019  Last Fill Quantity: 90,  # refills: 0   Last office visit: 12/26/2018 with prescribing provider:  Schoen   Future Office Visit:   Next 5 appointments (look out 90 days)    Oct 25, 2019  4:10 PM CDT  Office Visit with Gregory G [facility]oen, MD  [facility] [address] [place] [id number] [phone]      Medication is being filled for 1 time refill only due to:  Upcoming appointment with PCP.     Requested Prescriptions   Pending Prescriptions Disp Refills     atorvastatin (LIPITOR) 80 MG tablet [Pharmacy Med Name: ATORVASTATIN CALCIUM 80MG TABS] 90 tablet 0     Sig: TAKE ONE TABLET BY MOUTH EVERY MORNING       Statins Protocol Failed - 10/8/2019  7:31 PM        Failed - LDL on file in past 12 months     Recent Labs   Lab Test 07/03/18  0859   LDL 63           Passed - No abnormal creatine kinase in past 12 months     No lab results found.         Passed - Recent (12 mo) or future (30 days) visit within the authorizing provider's specialty     Patient has had an office visit with the authorizing provider or a provider within the authorizing providers department within the previous 12 mos or has a future within next 30 days. See \"Patient Info\" tab in inbasket, or \"Choose Columns\" in Meds & Orders section of the refill encounter.          Passed - Medication is active on med list        Passed - Patient is age 18 or older      Mary Ellen Greene RN   "

## 2019-12-13 ENCOUNTER — OFFICE VISIT (OUTPATIENT)
Dept: FAMILY MEDICINE | Facility: CLINIC | Age: 59
End: 2019-12-13
Payer: COMMERCIAL

## 2019-12-13 VITALS
WEIGHT: 202 LBS | TEMPERATURE: 97.4 F | RESPIRATION RATE: 16 BRPM | DIASTOLIC BLOOD PRESSURE: 74 MMHG | HEART RATE: 89 BPM | HEIGHT: 71 IN | OXYGEN SATURATION: 99 % | SYSTOLIC BLOOD PRESSURE: 132 MMHG | BODY MASS INDEX: 28.28 KG/M2

## 2019-12-13 DIAGNOSIS — I10 BENIGN ESSENTIAL HYPERTENSION: ICD-10-CM

## 2019-12-13 DIAGNOSIS — E78.5 HYPERLIPIDEMIA LDL GOAL <100: ICD-10-CM

## 2019-12-13 DIAGNOSIS — M25.511 CHRONIC RIGHT SHOULDER PAIN: ICD-10-CM

## 2019-12-13 DIAGNOSIS — E11.40 TYPE 2 DIABETES MELLITUS WITH DIABETIC NEUROPATHY, WITHOUT LONG-TERM CURRENT USE OF INSULIN (H): Primary | ICD-10-CM

## 2019-12-13 DIAGNOSIS — N48.6 PEYRONIE'S SYNDROME: ICD-10-CM

## 2019-12-13 DIAGNOSIS — G89.29 CHRONIC RIGHT SHOULDER PAIN: ICD-10-CM

## 2019-12-13 DIAGNOSIS — N52.1 ERECTILE DYSFUNCTION DUE TO DISEASES CLASSIFIED ELSEWHERE: ICD-10-CM

## 2019-12-13 PROCEDURE — 99214 OFFICE O/P EST MOD 30 MIN: CPT | Performed by: FAMILY MEDICINE

## 2019-12-13 PROCEDURE — 99207 C FOOT EXAM  NO CHARGE: CPT | Performed by: FAMILY MEDICINE

## 2019-12-13 RX ORDER — GLIMEPIRIDE 1 MG/1
1 TABLET ORAL DAILY
Qty: 90 TABLET | Refills: 0 | Status: SHIPPED | OUTPATIENT
Start: 2019-12-13 | End: 2020-06-19

## 2019-12-13 RX ORDER — ATORVASTATIN CALCIUM 80 MG/1
80 TABLET, FILM COATED ORAL EVERY MORNING
Qty: 90 TABLET | Refills: 0 | Status: SHIPPED | OUTPATIENT
Start: 2019-12-13 | End: 2020-06-19

## 2019-12-13 RX ORDER — AMLODIPINE AND BENAZEPRIL HYDROCHLORIDE 5; 20 MG/1; MG/1
1 CAPSULE ORAL DAILY
Qty: 90 CAPSULE | Refills: 0 | Status: SHIPPED | OUTPATIENT
Start: 2019-12-13 | End: 2020-06-19

## 2019-12-13 ASSESSMENT — MIFFLIN-ST. JEOR: SCORE: 1753.4

## 2019-12-13 ASSESSMENT — PAIN SCALES - GENERAL: PAINLEVEL: SEVERE PAIN (7)

## 2019-12-13 NOTE — PROGRESS NOTES
Subjective     Amanuel Mccord is a 59 year old male who presents to clinic today for the following health issues:    HPI   Diabetes Follow-up      How often are you checking your blood sugar? About every 3 weeks. Says was checking more frequently and they were fine so checks less.  No symptoms of low sugars.     What concerns do you have today about your diabetes? None     Do you have any of these symptoms? (Select all that apply)  No numbness or tingling in feet.  No redness, sores or blisters on feet.  No complaints of excessive thirst.  No reports of blurry vision.  No significant changes to weight.     Have you had a diabetic eye exam in the last 12 months? No    BP Readings from Last 2 Encounters:   12/13/19 132/74   12/26/18 120/82     Hemoglobin A1C (%)   Date Value   07/12/2019 6.6 (H)   04/19/2019 6.9 (H)     LDL Cholesterol Calculated (mg/dL)   Date Value   07/03/2018 63   04/04/2017 61       Diabetes Management Resources    Has concerns with right shoulder pain for about 10 years.  No formal work up at any point. History of lots of sports including baseball/throwing and boxing (however is left hand dominant). Pain on top of shoulder with abduction and flexion. No weakness at this point.  Does physical work and getting to the point of wanting it to be better.  States he is using up to 10 to 12 tablets of Aleve daily for relief and is strongly advised to not take more than half of this amount.  Not  a work related issue.      Also has been having issues with ED and Peyronie's syndrome, noting it is unsatisfactory for him. Cialis does help to some degree but states the tip of the penis seems to remain flaccid and therefore is difficult to have intercourse due to that as well as the bend/deviation in the tip.      He also is due for a number of HM needs but does not want to address any of those today.      Current Outpatient Medications   Medication Sig Dispense Refill     amLODIPine-benazepril (LOTREL)  "5-20 MG capsule Take 1 capsule by mouth daily 90 capsule 0     atorvastatin (LIPITOR) 80 MG tablet Take 1 tablet (80 mg) by mouth every morning Must keep upcoming appointment for refills. 90 tablet 0     glimepiride (AMARYL) 1 MG tablet Take 1 tablet (1 mg) by mouth daily 90 tablet 0     sitagliptin-metFORMIN (JANUMET)  MG tablet Take 1 tablet by mouth 2 times daily (with meals) 180 tablet 0     sildenafil (VIAGRA) 100 MG tablet Take 1 tablet (100 mg) by mouth daily as needed (Patient not taking: Reported on 12/13/2019) 4 tablet 3           Review of Systems   ROS COMP: CONSTITUTIONAL: NEGATIVE for fever, chills, change in weight  INTEGUMENTARY/SKIN: NEGATIVE for worrisome rashes, moles or lesions  EYES: NEGATIVE for vision changes or irritation  EYES: has not had an eye exam for some time but does plan to do that soon. 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    ENT/MOUTH: NEGATIVE for ear, mouth and throat problems  RESP: NEGATIVE for significant cough or SOB  CV: NEGATIVE for chest pain, palpitations or peripheral edema  GI: NEGATIVE for nausea, abdominal pain, heartburn, or change in bowel habits   male as above  MUSCULOSKELETAL: as above regarding right shoulder.  Also notes prior left elbow \"Westley Jaxson\" surgery and left hip replacement.   NEURO: NEGATIVE for weakness, dizziness or paresthesias  ENDOCRINE: see above regarding diabetes  HEME: NEGATIVE for bleeding problems  PSYCHIATRIC: NEGATIVE for changes in mood or affect      Objective    /74 (Cuff Size: Adult Large)   Pulse 89   Temp 97.4  F (36.3  C) (Temporal)   Resp 16   Ht 1.803 m (5' 11\")   Wt 91.6 kg (202 lb)   SpO2 99%   BMI 28.17 kg/m    Body mass index is 28.17 kg/m .  Physical Exam   Alert and oriented, in no acute distress.  PERRL, left eye is \"lazy\" and does not have conjugate gaze.  Appears to have cataract development of left eye " with right eye being more clear with normal fundus.   TMs, nose, throat all normal.   The neck is supple and free of adenopathy or masses, the thyroid is normal without enlargement or nodules.  Chest is clear to auscultation.  Heart is regular without murmurs, clicks or rubs.   Abdomen - Abdomen soft, non-tender. BS normal. No masses, organomegaly  Did not do a genital exam.   Right shoulder exam shows no asymmetry.  He has tenderness to palpation over the anterior and mid aspect of the deltoid.  He describes discomfort along the back of the neck in the trapezius area but there is no spasm to palpation in this region.  There is no apprehension with stressing the joint.  He has markedly decreased internal and external rotation but it is symmetric with his left shoulder which is also quite limited.  He has full range of motion and strength of his elbow and wrist.  He has 5/5 strength in the shoulder region but indicates discomfort with resistance.  Foot exam shows no lesions and normal sensation.    Lab work is pending.    ASSESSMENT:   Type 2 diabetes mellitus with diabetic neuropathy, without long-term current use of insulin (H)  Erectile dysfunction due to diseases classified elsewhere  Peyronie's syndrome  Chronic right shoulder pain  Benign essential hypertension  Hyperlipidemia LDL goal <100    PLAN:  We discussed his diabetes management and he is aware that there is some risk with not checking his sugars frequently but he is clinically not having any signs of hypoglycemia.  Past A1c levels have been satisfactory and he will return fasting for his annual diabetes blood work to include A1c, basic profile, urine microalbumin, TSH and lipid profile.     He requests referral to urology regarding his erectile dysfunction and Peyronie's syndrome and has been scheduled with Dr. Ware in the near future and however for further evaluation.    He also requested consultation with orthopedics for his right shoulder.  His  "symptoms are nonspecific and may have chronic rotator cuff issues.  Referral has been made per his request and I deferred any imaging to be ordered by orthopedics.    His blood pressure is at target today on his current medications and no changes indicated.  He will be returning to the above lab work as noted.  Similarly he is tolerating his statin without side effects and will return for blood work as above with an additional ALT to assess his liver function.    We discussed numerous health maintenance needs and he declines vaccines including flu shot and pneumococcal vaccine at this time.  We discussed colon cancer screening and he struggles with that but then says he promises he will have that done in 2020.  He is not interested in \"pooping in a box or card\" as an alternative means of testing.  He says we can further discuss these things when he comes back for recheck in 2020.  I recommended a six-month follow-up unless his blood work dictates otherwise.    Electronically signed by Greg Schoen, MD          "

## 2019-12-20 DIAGNOSIS — E11.40 TYPE 2 DIABETES MELLITUS WITH DIABETIC NEUROPATHY, WITHOUT LONG-TERM CURRENT USE OF INSULIN (H): ICD-10-CM

## 2019-12-20 DIAGNOSIS — E78.5 HYPERLIPIDEMIA LDL GOAL <100: ICD-10-CM

## 2019-12-20 DIAGNOSIS — I10 BENIGN ESSENTIAL HYPERTENSION: ICD-10-CM

## 2019-12-20 LAB
ALT SERPL W P-5'-P-CCNC: 41 U/L (ref 0–70)
ANION GAP SERPL CALCULATED.3IONS-SCNC: 3 MMOL/L (ref 3–14)
BUN SERPL-MCNC: 18 MG/DL (ref 7–30)
CALCIUM SERPL-MCNC: 9.5 MG/DL (ref 8.5–10.1)
CHLORIDE SERPL-SCNC: 105 MMOL/L (ref 94–109)
CHOLEST SERPL-MCNC: 178 MG/DL
CO2 SERPL-SCNC: 30 MMOL/L (ref 20–32)
CREAT SERPL-MCNC: 0.74 MG/DL (ref 0.66–1.25)
CREAT UR-MCNC: 94 MG/DL
GFR SERPL CREATININE-BSD FRML MDRD: >90 ML/MIN/{1.73_M2}
GLUCOSE SERPL-MCNC: 186 MG/DL (ref 70–99)
HBA1C MFR BLD: 6.7 % (ref 0–5.6)
HDLC SERPL-MCNC: 72 MG/DL
LDLC SERPL CALC-MCNC: 73 MG/DL
MICROALBUMIN UR-MCNC: 28 MG/L
MICROALBUMIN/CREAT UR: 29.89 MG/G CR (ref 0–17)
NONHDLC SERPL-MCNC: 106 MG/DL
POTASSIUM SERPL-SCNC: 4.6 MMOL/L (ref 3.4–5.3)
SODIUM SERPL-SCNC: 138 MMOL/L (ref 133–144)
TRIGL SERPL-MCNC: 166 MG/DL
TSH SERPL DL<=0.005 MIU/L-ACNC: 1.43 MU/L (ref 0.4–4)

## 2019-12-20 PROCEDURE — 80061 LIPID PANEL: CPT | Performed by: FAMILY MEDICINE

## 2019-12-20 PROCEDURE — 84443 ASSAY THYROID STIM HORMONE: CPT | Performed by: FAMILY MEDICINE

## 2019-12-20 PROCEDURE — 80048 BASIC METABOLIC PNL TOTAL CA: CPT | Performed by: FAMILY MEDICINE

## 2019-12-20 PROCEDURE — 36415 COLL VENOUS BLD VENIPUNCTURE: CPT | Performed by: FAMILY MEDICINE

## 2019-12-20 PROCEDURE — 84460 ALANINE AMINO (ALT) (SGPT): CPT | Performed by: FAMILY MEDICINE

## 2019-12-20 PROCEDURE — 82043 UR ALBUMIN QUANTITATIVE: CPT | Performed by: FAMILY MEDICINE

## 2019-12-20 PROCEDURE — 83036 HEMOGLOBIN GLYCOSYLATED A1C: CPT | Mod: QW | Performed by: FAMILY MEDICINE

## 2020-01-06 ENCOUNTER — NURSE TRIAGE (OUTPATIENT)
Dept: NURSING | Facility: CLINIC | Age: 60
End: 2020-01-06

## 2020-01-06 ENCOUNTER — ANCILLARY PROCEDURE (OUTPATIENT)
Dept: GENERAL RADIOLOGY | Facility: CLINIC | Age: 60
End: 2020-01-06
Attending: PHYSICIAN ASSISTANT
Payer: COMMERCIAL

## 2020-01-06 ENCOUNTER — OFFICE VISIT (OUTPATIENT)
Dept: ORTHOPEDICS | Facility: CLINIC | Age: 60
End: 2020-01-06
Payer: COMMERCIAL

## 2020-01-06 VITALS
BODY MASS INDEX: 28.28 KG/M2 | SYSTOLIC BLOOD PRESSURE: 138 MMHG | HEIGHT: 71 IN | WEIGHT: 202 LBS | DIASTOLIC BLOOD PRESSURE: 74 MMHG

## 2020-01-06 DIAGNOSIS — G56.21 IRRITATION OF RIGHT ULNAR NERVE: ICD-10-CM

## 2020-01-06 DIAGNOSIS — S46.211A BICEPS RUPTURE, PROXIMAL, RIGHT, INITIAL ENCOUNTER: Primary | ICD-10-CM

## 2020-01-06 DIAGNOSIS — S40.011A CONTUSION OF RIGHT SHOULDER, INITIAL ENCOUNTER: ICD-10-CM

## 2020-01-06 DIAGNOSIS — M25.519 SHOULDER PAIN: ICD-10-CM

## 2020-01-06 PROCEDURE — 73030 X-RAY EXAM OF SHOULDER: CPT | Mod: TC

## 2020-01-06 PROCEDURE — 99204 OFFICE O/P NEW MOD 45 MIN: CPT | Performed by: PHYSICIAN ASSISTANT

## 2020-01-06 RX ORDER — MELOXICAM 15 MG/1
15 TABLET ORAL DAILY
Qty: 30 TABLET | Refills: 1 | Status: SHIPPED | OUTPATIENT
Start: 2020-01-06 | End: 2020-01-16 | Stop reason: ALTCHOICE

## 2020-01-06 RX ORDER — CYCLOBENZAPRINE HCL 5 MG
5-10 TABLET ORAL 3 TIMES DAILY PRN
Qty: 60 TABLET | Refills: 0 | Status: SHIPPED | OUTPATIENT
Start: 2020-01-06 | End: 2020-02-11

## 2020-01-06 ASSESSMENT — PAIN SCALES - GENERAL: PAINLEVEL: WORST PAIN (10)

## 2020-01-06 ASSESSMENT — MIFFLIN-ST. JEOR: SCORE: 1753.4

## 2020-01-06 NOTE — PROGRESS NOTES
ORTHOPEDIC CONSULT      Chief Complaint: Amanuel Mccord is a 59 year old left hand dominant male who recently retired but previous to this he worked at [employer].  He enjoys fishing.    He is being seen for   Chief Complaints and History of Present Illnesses   Patient presents with     Consult     rt shoulder pain           History of Present Illness:   Mechanism of Injury: Patient slipped on the ice yesterday, 1/5/2020 and fell on his outstretched right arm.  Location: Left anterior shoulder  Duration of Pain: Since yesterday  Rating of Pain: 10 out of 10 per the patient  Pain Quality: Achy  Pain is better with: Resting the arm and keeping it not moving  Pain is worse with: Movement especially abduction  Treatment so far consists of: Aleve, rest.   Associated Features: Right shoulder pain anterior with also some numbness and tingling in the ulnar nerve distribution.  Prior history of related problems: Patient states he has had a bad right shoulder for approximately 15 years.  He states that it has been much worse in the last 5 years.  Patient admits that he has been able to use his arm to do stuff above shoulder level and away from his body however he has had difficulty with internal rotation over the last several years.  Pain is Limiting: Motion of right shoulder.  Here to: Orthopedic Consult   The Pain Has: Been about the same  Additional History: Patient as mentioned above has had shoulder problems for about 15 years.  No major surgery.  Patient has never had an on his right shoulder.      Patient's past medical, surgical, social and family histories reviewed.     No past medical history on file.     No past surgical history on file.    Medications:  amLODIPine-benazepril (LOTREL) 5-20 MG capsule, Take 1 capsule by mouth daily  atorvastatin (LIPITOR) 80 MG tablet, Take 1 tablet (80 mg) by mouth every morning Must keep upcoming appointment for refills.  glimepiride (AMARYL) 1 MG tablet, Take 1 tablet (1 mg)  "by mouth daily  sildenafil (VIAGRA) 100 MG tablet, Take 1 tablet (100 mg) by mouth daily as needed (Patient not taking: Reported on 12/13/2019)  sitagliptin-metFORMIN (JANUMET)  MG tablet, Take 1 tablet by mouth 2 times daily (with meals)    No current facility-administered medications on file prior to visit.       No Known Allergies    Social History     Occupational History     Not on file   Tobacco Use     Smoking status: Current Every Day Smoker     Types: Cigarettes     Smokeless tobacco: Never Used   Substance and Sexual Activity     Alcohol use: Yes     Drug use: No     Sexual activity: Yes       Family History   Problem Relation Age of Onset     Hypertension Mother      Hyperlipidemia Mother      Hyperlipidemia Father      Hypertension Father      Diabetes Brother      Diabetes Sister        REVIEW OF SYSTEMS  10 point review systems performed otherwise negative as noted as per history of present illness.    Physical Exam:  Vitals: /74   Ht 1.803 m (5' 11\")   Wt 91.6 kg (202 lb)   BMI 28.17 kg/m    BMI= Body mass index is 28.17 kg/m .    Constitutional: healthy, alert and no acute distress but not wanting to use his right arm.  Patient is very hyperverbal today.  Psychiatric: mentation appears normal and affect normal/bright  NEURO: no focal deficits, CMS intact right lower extremity except for some decreased sensation and numbness and tingling in the ulnar nerve distribution.  RESP: Normal with easy respirations and no use of accessory muscles to breathe, no audible wheezing or retractions  CV: +2 radial pulse and his hand is warm to palpation.   SKIN: Patient has significant swelling over the anterior shoulder about the proximal biceps tendon and ecchymosis just inferior to the proximal biceps tendon.  Other areas of the shoulder no erythema, rashes, excoriation, or breakdown. No evidence of infection.   MUSCULOSKELETAL:    INSPECTION of right shoulder: No gross deformities, erythema, " edema, ecchymosis, atrophy or fasciculations.  I do not feel that he has Ramez deformity.    PALPATION: Significant tenderness to palpation over the anterior shoulder over the proximal biceps tendon.  No tenderness to palpation of the AC joint, clavicle, lateral shoulder, posterior shoulder, trapezius area. No increased warmth noted.     ROM: Patient was very guarded with his range of motion today.  I was able to have the patient do some Codman's.  Patient could do approximately 10 degrees of abduction.  Patient did approximately 10 degrees of external rotation and was not able to do much for internal rotation.  Forward flexion to about 20 degrees.  Patient was too painful to do much more range of motion.  I could tell that the shoulder could move I felt it was located.    STRENGTH: 5 out of 5 , interosseous and thumb strength without pain.  negative 4 out of 5 internal and external rotation compared to 5 out of 5 on the contralateral side.    SPECIAL TEST: Patient was unable to do Neer test, unable to do Arambula sign, unable to do cross over test, ne unable to do gative belly press and unable to do liftoff test, neg unable to do ative empty can and full can test, negative external rotator lag sign, unable to do drop test.  No Ramez deformity.  GAIT: non-antalgic  Lymph: no palpable lymph nodes    Diagnostic Modalities:  X-rays done today scapular Y and Grashey.  Patient unable to do the axillary view.  No fracture no dislocation no tumor.  I do believe the glenohumeral joint is located even though I do not have an axillary view.  There is no degenerative joint disease of the glenohumeral joint.  The AC joint has slight degenerative joint disease but minimal.    Independent visualization of the images was performed.    Impression: 1.  1 day status post right shoulder possible proximal biceps tear.  2.  1 day status post right shoulder contusion.  3.  1 day status post right ulnar nerve irritation    Plan:  All  of the above pertinent physical exam and imaging modalities findings was reviewed with Amanuel and his wife.                                          CONSERVATIVE CARE:    Patient Instructions:  1.  X-ray: Your x-rays look good.  No fracture no dislocation no tumor.  It looks like you have good joint space.  It does look like your shoulder is located.  We were not able to get an axillary view which would tell us that better but overall the rest of the knees look good.  2.  Exam: Your exam is limited secondary to pain.  The main thing we notice is anterior shoulder pain and tenderness as well as a lot of bruising and ecchymosis and swelling around her proximal biceps area.  3.  Rest: Right now this shoulder is very irritated and the main thing we want to do is rest it.  Ice, elevation and decreased activity.  4.  Medication: We will try some Mobic as well as Flexeril to help you sleep at night and get comfortable.  We did talk about narcotic medication but the [facility] has cracked down on this.  I would much rather have you on an anti-inflammatory because you are having a lot of inflammation than a medication that will mask the pain.  5.  Therapy: Right now I just want you doing simple Codman's exercises which are dangling exercises which I demonstrated today.  I also try and work on range of motion of your fingers wrist and elbow 3 times a day.  Right now I just wanted to easy exercises as the pain feels better I have more stretching you can do listed below.  6.  MRI: If it is a proximal biceps tear we do not necessarily need an MRI.  It is difficult to get a good exam to ascertain if we need an MRI at this point.  I would recommend letting the shoulder calm down before you are seen again.  This way we can get a better exam.  7.  Proximal biceps tear: If this is a proximal biceps tear oftentimes it can be treated nonoperatively and you do not lose all that much strength or function by just letting it heal.  This is also  your nondominant arm which is good.  8.  Sleeping: I would suggest sleeping in a recliner with a lot of pillows so you do not roll over.  Get sleep also I gave you a muscle relaxant to help with this.    9.  Ulnar nerve: More than likely this nerve is irritated second to Sea to the contusion or may be some swelling.  This should get better with time just keep an eye on it.    10.  Appointment with Dr. Edmond: I would recommend waiting another week before you are seen just so that he is able to get a better exam.  It sounded like he really wanted to see him but to get more out of the exam I would wait a week.  11. Follow up with Sameer Ross PA-C in 4 weeks or on an as needed basis.   Re-x-ray on return: Yes, could do an Axillary view if he can tolerate it on the next visit.     BP Readings from Last 1 Encounters:   01/06/20 138/74       BP noted to be well controlled today in office.      Patient does use Tobacco products. Patient not ready to quit at this time.  Strongly encouraged smoking cessation.  Risks of smoking and benefits of quitting were discussed.  Information offered: AVS with information about stopping smoking and advised to discuss smoking cessation medications/strategies with Primary care provider.  3-5 Minutes were spent in counseling.    This note was dictated with [employer].    Sameer Ross PA-C

## 2020-01-06 NOTE — PATIENT INSTRUCTIONS
Encounter Diagnoses   Name Primary?     Possible Biceps rupture, proximal, right, initial encounter Yes     Contusion of right shoulder, initial encounter      Irritation of right ulnar nerve      Rest, ice and elevate above heart level as needed for pain control  1.  X-ray: Your x-rays look good.  No fracture no dislocation no tumor.  It looks like you have good joint space.  It does look like your shoulder is located.  We were not able to get an axillary view which would tell us that better but overall the rest of the knees look good.  2.  Exam: Your exam is limited secondary to pain.  The main thing we notice is anterior shoulder pain and tenderness as well as a lot of bruising and ecchymosis and swelling around her proximal biceps area.  3.  Rest: Right now this shoulder is very irritated and the main thing we want to do is rest it.  Ice, elevation and decreased activity.  4.  Medication: We will try some Mobic as well as Flexeril to help you sleep at night and get comfortable.  We did talk about narcotic medication but the [facility] has cracked down on this.  I would much rather have you on an anti-inflammatory because you are having a lot of inflammation than a medication that will mask the pain.  5.  Therapy: Right now I just want you doing simple Codman's exercises which are dangling exercises which I demonstrated today.  I also try and work on range of motion of your fingers wrist and elbow 3 times a day.  Right now I just wanted to easy exercises as the pain feels better I have more stretching you can do listed below.  6.  MRI: If it is a proximal biceps tear we do not necessarily need an MRI.  It is difficult to get a good exam to ascertain if we need an MRI at this point.  I would recommend letting the shoulder calm down before you are seen again.  This way we can get a better exam.  7.  Proximal biceps tear: If this is a proximal biceps tear oftentimes it can be treated nonoperatively and you do not lose all  that much strength or function by just letting it heal.  This is also your nondominant arm which is good.  8.  Sleeping: I would suggest sleeping in a recliner with a lot of pillows so you do not roll over.  Get sleep also I gave you a muscle relaxant to help with this.    9.  Ulnar nerve: More than likely this nerve is irritated second to Sea to the contusion or may be some swelling.  This should get better with time just keep an eye on it.    10.  Appointment with Dr. Edmond: I would recommend waiting another week before you are seen just so that he is able to get a better exam.  It sounded like he really wanted to see him but to get more out of the exam I would wait a week.  11. Follow up with Sameer Ross PA-C in 4 weeks or on an as needed basis.       [employer] and [employer] may offer reliable information regarding your diagnosis and treatment plan.    THANK YOU for coming in today. If you receive a survey via haystagg or mail please let us know if there was anything you especially appreciated today or if there is any way we can improve our clinic. We appreciate your input.    GENERAL INFORMATION:  Our hours are:  (Pending)    [facility] for any issues or concerns: [phone]      We are not in the office Thursdays. Therefore non- urgent calls and medical messages received on Thursday will be addressed when we are back in the office on Wednesday. Urgent matters will be reviewed and addressed by one of our partners in the office as needed.    If lab work was done today as part of your evaluation you will generally be contacted via haystagg, mail, or phone with the results within 1-5 days. If there is an alarming result we will contact you by phone. Lab results come back at varying times, I generally wait until all labs are resulted before making comments on results. Please note labs are automatically released to haystagg (if you have signed up for it) once available-at times you may  see these prior to my having a chance to review them as well.    If you need refills please contact your pharmacist. They will send a refill request to me to review. Please allow 3 business days for us to process all refill requests. All narcotic refills should be handled in the clinic at the time of your visit.   Patient Education     Exercises for Shoulder Flexibility: Pendulum Exercise      Improving your flexibility can reduce pain. Stretching exercises also can help increase your range of pain-free motion. Breathe normally when you exercise. And try to use smooth, fluid movements.  Follow any special instructions you are given. If you feel pain, stop the exercise. If the pain continues after stopping, call your healthcare provider.  Here are the steps for the pendulum exercise:     Lean over with your good arm supported on a table or chair.    Relax the arm on the painful side, letting it hang straight down.    Slowly begin to swing the relaxed arm. Move it in a small [place], gradually making it bigger if you can. Then reverse the direction. Next, move it backward and forward. Finally, move it side to side.     Note: Spend about 5 minutes doing the exercise, 3 times a day. Change direction after 1 minute of motion.   Date Last Reviewed: 12/1/2017 2000-2019 [employer]. [address] [place] [address], [place] [address]. All rights reserved. This information is not intended as a substitute for professional medical care. Always follow your healthcare professional's instructions.      WAIT TO START THESE ONCE YOUR SHOULDER IS CALMED DOWN:    Exercises for Shoulder Flexibility: External Rotation  This stretch can help restore shoulder flexibility and relieve pain over time. When stretching, be sure to breathe deeply. Follow any special instructions from your doctor or physical therapist:     a doorway. Grasp the doorjamb with the hand on the frozen side. Your arm should be bent.    With the other  hand, hold the elbow on the frozen side firmly against your body.    Standing in the same spot, rotate your body away from the doorjamb. Stop when you feel the stretch in the shoulder. At first, try to hold the stretch for 5 seconds.    Work up to doing 3 sets of this stretch, 3 times a day. Work up to holding the stretch for 30 to 60 seconds.  Note: Keep your arms as still as you can. Over time, rotate your body a little more to enhance the stretch. But be careful not to twist your back.        Exercises for Shoulder Flexibility: Internal Rotation    This stretch can help restore shoulder flexibility and relieve pain over time. When stretching, be sure to breathe deeply. Follow any special instructions from your healthcare provider or physical therapist.    While seated, move the arm on the side you want to stretch toward the middle of your back. The palm of your hand should face out.    Cup your other hand under the hand that s behind your back. Gently push your cupped hand upward until you feel the stretch in the shoulder. Try to hold the stretch for 5 seconds.    Work up to doing 3 sets of this stretch, 3 times a day. Work up to holding the stretch for 30 to 60 seconds.  Note: Keep your back straight. It s OK if your hand can t reach the middle of your back. Instead, start the stretch with your hand as close as you can get it to the middle of your back.      Exercises for Shoulder Flexibility: Wall Walk  Improving your flexibility can reduce pain. Stretching exercises also can help increase your range of pain-free motion. Breathe normally when you exercise. Use smooth, fluid movements.  Note: Follow any special instructions you are given. If you feel pain, stop the exercise. If the pain continues after stopping, call your healthcare provider:    Stand with your shoulder about 2 feet from the wall.    Raise your arm to shoulder level and gently  walk  your fingers up the wall as high as you can.    Hold for a few  seconds. Then walk your fingers back down.    Repeat 3 times. Move closer to the wall as you repeat.    Build up to holding each stretch for 30 seconds.  Caution: Do this stretch only if your healthcare provider recommends it. Don t do it when you are first injured.            7384-7077 [employer]. [address] [place] [address], [place] [address]. All rights reserved. This information is not intended as a substitute for professional medical care. Always follow your healthcare professional's instructions.

## 2020-01-06 NOTE — TELEPHONE ENCOUNTER
Amanuel calling to inquire if he can see ortho provider Dr. Edmond today instead of this coming Thursday?  Fell over weekend, believes he dislocated his shoulder.  Not willing to go to ED.   Advised to call back when clinic open, unable to access provider's schedule to advise which location Dr. Edmond in today.      Reason for Disposition    Requesting regular office appointment    Protocols used: INFORMATION ONLY CALL-A-AH

## 2020-01-06 NOTE — LETTER
1/6/2020         RE: Amanuel Mccord  [address]  [facility] [address]        Dear Colleague,    Thank you for referring your patient, Amanuel Mccord, to the [facility]. Please see a copy of my visit note below.    ORTHOPEDIC CONSULT      Chief Complaint: Amanuel Mccord is a 59 year old left hand dominant male who recently retired but previous to this he worked at [employer].  He enjoys fishing.    He is being seen for   Chief Complaints and History of Present Illnesses   Patient presents with     Consult     rt shoulder pain           History of Present Illness:   Mechanism of Injury: Patient slipped on the ice yesterday, 1/5/2020 and fell on his outstretched right arm.  Location: Left anterior shoulder  Duration of Pain: Since yesterday  Rating of Pain: 10 out of 10 per the patient  Pain Quality: Achy  Pain is better with: Resting the arm and keeping it not moving  Pain is worse with: Movement especially abduction  Treatment so far consists of: Aleve, rest.   Associated Features: Right shoulder pain anterior with also some numbness and tingling in the ulnar nerve distribution.  Prior history of related problems: Patient states he has had a bad right shoulder for approximately 15 years.  He states that it has been much worse in the last 5 years.  Patient admits that he has been able to use his arm to do stuff above shoulder level and away from his body however he has had difficulty with internal rotation over the last several years.  Pain is Limiting: Motion of right shoulder.  Here to: Orthopedic Consult   The Pain Has: Been about the same  Additional History: Patient as mentioned above has had shoulder problems for about 15 years.  No major surgery.  Patient has never had an on his right shoulder.      Patient's past medical, surgical, social and family histories reviewed.     No past medical history on file.     No past surgical history on  "file.    Medications:  amLODIPine-benazepril (LOTREL) 5-20 MG capsule, Take 1 capsule by mouth daily  atorvastatin (LIPITOR) 80 MG tablet, Take 1 tablet (80 mg) by mouth every morning Must keep upcoming appointment for refills.  glimepiride (AMARYL) 1 MG tablet, Take 1 tablet (1 mg) by mouth daily  sildenafil (VIAGRA) 100 MG tablet, Take 1 tablet (100 mg) by mouth daily as needed (Patient not taking: Reported on 12/13/2019)  sitagliptin-metFORMIN (JANUMET)  MG tablet, Take 1 tablet by mouth 2 times daily (with meals)    No current facility-administered medications on file prior to visit.       No Known Allergies    Social History     Occupational History     Not on file   Tobacco Use     Smoking status: Current Every Day Smoker     Types: Cigarettes     Smokeless tobacco: Never Used   Substance and Sexual Activity     Alcohol use: Yes     Drug use: No     Sexual activity: Yes       Family History   Problem Relation Age of Onset     Hypertension Mother      Hyperlipidemia Mother      Hyperlipidemia Father      Hypertension Father      Diabetes Brother      Diabetes Sister        REVIEW OF SYSTEMS  10 point review systems performed otherwise negative as noted as per history of present illness.    Physical Exam:  Vitals: /74   Ht 1.803 m (5' 11\")   Wt 91.6 kg (202 lb)   BMI 28.17 kg/m     BMI= Body mass index is 28.17 kg/m .    Constitutional: healthy, alert and no acute distress but not wanting to use his right arm.  Patient is very hyperverbal today.  Psychiatric: mentation appears normal and affect normal/bright  NEURO: no focal deficits, CMS intact right lower extremity except for some decreased sensation and numbness and tingling in the ulnar nerve distribution.  RESP: Normal with easy respirations and no use of accessory muscles to breathe, no audible wheezing or retractions  CV: +2 radial pulse and his hand is warm to palpation.   SKIN: Patient has significant swelling over the anterior shoulder " about the proximal biceps tendon and ecchymosis just inferior to the proximal biceps tendon.  Other areas of the shoulder no erythema, rashes, excoriation, or breakdown. No evidence of infection.   MUSCULOSKELETAL:    INSPECTION of right shoulder: No gross deformities, erythema, edema, ecchymosis, atrophy or fasciculations.  I do not feel that he has Ramez deformity.    PALPATION: Significant tenderness to palpation over the anterior shoulder over the proximal biceps tendon.  No tenderness to palpation of the AC joint, clavicle, lateral shoulder, posterior shoulder, trapezius area. No increased warmth noted.     ROM: Patient was very guarded with his range of motion today.  I was able to have the patient do some Codman's.  Patient could do approximately 10 degrees of abduction.  Patient did approximately 10 degrees of external rotation and was not able to do much for internal rotation.  Forward flexion to about 20 degrees.  Patient was too painful to do much more range of motion.  I could tell that the shoulder could move I felt it was located.    STRENGTH: 5 out of 5 , interosseous and thumb strength without pain.  negative 4 out of 5 internal and external rotation compared to 5 out of 5 on the contralateral side.    SPECIAL TEST: Patient was unable to do Neer test, unable to do Arambula sign, unable to do cross over test, ne unable to do gative belly press and unable to do liftoff test, neg unable to do ative empty can and full can test, negative external rotator lag sign, unable to do drop test.  No Ramez deformity.  GAIT: non-antalgic  Lymph: no palpable lymph nodes    Diagnostic Modalities:  X-rays done today scapular Y and Grashey.  Patient unable to do the axillary view.  No fracture no dislocation no tumor.  I do believe the glenohumeral joint is located even though I do not have an axillary view.  There is no degenerative joint disease of the glenohumeral joint.  The AC joint has slight degenerative  joint disease but minimal.    Independent visualization of the images was performed.    Impression: 1.  1 day status post right shoulder possible proximal biceps tear.  2.  1 day status post right shoulder contusion.  3.  1 day status post right ulnar nerve irritation    Plan:  All of the above pertinent physical exam and imaging modalities findings was reviewed with Amanuel and his wife.                                          CONSERVATIVE CARE:    Patient Instructions:  1.  X-ray: Your x-rays look good.  No fracture no dislocation no tumor.  It looks like you have good joint space.  It does look like your shoulder is located.  We were not able to get an axillary view which would tell us that better but overall the rest of the knees look good.  2.  Exam: Your exam is limited secondary to pain.  The main thing we notice is anterior shoulder pain and tenderness as well as a lot of bruising and ecchymosis and swelling around her proximal biceps area.  3.  Rest: Right now this shoulder is very irritated and the main thing we want to do is rest it.  Ice, elevation and decreased activity.  4.  Medication: We will try some Mobic as well as Flexeril to help you sleep at night and get comfortable.  We did talk about narcotic medication but the [facility] has cracked down on this.  I would much rather have you on an anti-inflammatory because you are having a lot of inflammation than a medication that will mask the pain.  5.  Therapy: Right now I just want you doing simple Codman's exercises which are dangling exercises which I demonstrated today.  I also try and work on range of motion of your fingers wrist and elbow 3 times a day.  Right now I just wanted to easy exercises as the pain feels better I have more stretching you can do listed below.  6.  MRI: If it is a proximal biceps tear we do not necessarily need an MRI.  It is difficult to get a good exam to ascertain if we need an MRI at this point.  I would recommend letting the  shoulder calm down before you are seen again.  This way we can get a better exam.  7.  Proximal biceps tear: If this is a proximal biceps tear oftentimes it can be treated nonoperatively and you do not lose all that much strength or function by just letting it heal.  This is also your nondominant arm which is good.  8.  Sleeping: I would suggest sleeping in a recliner with a lot of pillows so you do not roll over.  Get sleep also I gave you a muscle relaxant to help with this.    9.  Ulnar nerve: More than likely this nerve is irritated second to [place] to the contusion or may be some swelling.  This should get better with time just keep an eye on it.    10.  Appointment with Dr. Edmond: I would recommend waiting another week before you are seen just so that he is able to get a better exam.  It sounded like he really wanted to see him but to get more out of the exam I would wait a week.  11. Follow up with Sameer Ross PA-C in 4 weeks or on an as needed basis.   Re-x-ray on return: Yes, could do an Axillary view if he can tolerate it on the next visit.     BP Readings from Last 1 Encounters:   01/06/20 138/74       BP noted to be well controlled today in office.      Patient does use Tobacco products. Patient not ready to quit at this time.  Strongly encouraged smoking cessation.  Risks of smoking and benefits of quitting were discussed.  Information offered: AVS with information about stopping smoking and advised to discuss smoking cessation medications/strategies with Primary care provider.  3-5 Minutes were spent in counseling.    This note was dictated with [employer].    Sameer Ross PA-C          Again, thank you for allowing me to participate in the care of your patient.        Sincerely,        Sameer Ross PA-C

## 2020-01-09 ENCOUNTER — OFFICE VISIT (OUTPATIENT)
Dept: UROLOGY | Facility: OTHER | Age: 60
End: 2020-01-09
Payer: COMMERCIAL

## 2020-01-09 ENCOUNTER — OFFICE VISIT (OUTPATIENT)
Dept: ORTHOPEDICS | Facility: OTHER | Age: 60
End: 2020-01-09
Payer: COMMERCIAL

## 2020-01-09 VITALS
SYSTOLIC BLOOD PRESSURE: 146 MMHG | WEIGHT: 202 LBS | HEIGHT: 71 IN | BODY MASS INDEX: 28.28 KG/M2 | DIASTOLIC BLOOD PRESSURE: 68 MMHG

## 2020-01-09 VITALS — HEART RATE: 105 BPM | OXYGEN SATURATION: 96 % | DIASTOLIC BLOOD PRESSURE: 93 MMHG | SYSTOLIC BLOOD PRESSURE: 144 MMHG

## 2020-01-09 DIAGNOSIS — I10 BENIGN ESSENTIAL HYPERTENSION: ICD-10-CM

## 2020-01-09 DIAGNOSIS — N48.6 PEYRONIE'S DISEASE: ICD-10-CM

## 2020-01-09 DIAGNOSIS — S49.91XA INJURY OF RIGHT SHOULDER, INITIAL ENCOUNTER: Primary | ICD-10-CM

## 2020-01-09 DIAGNOSIS — M25.511 CHRONIC RIGHT SHOULDER PAIN: ICD-10-CM

## 2020-01-09 DIAGNOSIS — G89.29 CHRONIC RIGHT SHOULDER PAIN: ICD-10-CM

## 2020-01-09 DIAGNOSIS — S46.211A RUPTURE OF RIGHT PROXIMAL BICEPS TENDON, INITIAL ENCOUNTER: ICD-10-CM

## 2020-01-09 PROCEDURE — 99214 OFFICE O/P EST MOD 30 MIN: CPT | Performed by: ORTHOPAEDIC SURGERY

## 2020-01-09 PROCEDURE — 99244 OFF/OP CNSLTJ NEW/EST MOD 40: CPT | Performed by: UROLOGY

## 2020-01-09 RX ORDER — DICLOFENAC SODIUM 75 MG/1
75 TABLET, DELAYED RELEASE ORAL 2 TIMES DAILY PRN
Qty: 30 TABLET | Refills: 1 | Status: ON HOLD | OUTPATIENT
Start: 2020-01-09 | End: 2020-02-24

## 2020-01-09 ASSESSMENT — MIFFLIN-ST. JEOR: SCORE: 1753.4

## 2020-01-09 ASSESSMENT — PAIN SCALES - GENERAL: PAINLEVEL: EXTREME PAIN (9)

## 2020-01-09 NOTE — PROGRESS NOTES
S: Patient is a pleasant 59-year-old  male who is request be seen by Dr. Gregory Schoen with regard to patient's erectile dysfunction and penile curvature.  Patient has had problem with erectile dysfunction for a number of years.  He has tried oral medications in the past.  Previously they seem to work well however lately results are not good.  He has very soft erection not firm enough from it for penetration.  He also noticed a curvature upward with erections.  He has mild issues with penetration from the curvature.  He has a long history of diabetes, high blood pressure, and smoking.  Current Outpatient Medications   Medication Sig Dispense Refill     amLODIPine-benazepril (LOTREL) 5-20 MG capsule Take 1 capsule by mouth daily 90 capsule 0     atorvastatin (LIPITOR) 80 MG tablet Take 1 tablet (80 mg) by mouth every morning Must keep upcoming appointment for refills. 90 tablet 0     glimepiride (AMARYL) 1 MG tablet Take 1 tablet (1 mg) by mouth daily 90 tablet 0     meloxicam (MOBIC) 15 MG tablet Take 1 tablet (15 mg) by mouth daily 30 tablet 1     sitagliptin-metFORMIN (JANUMET)  MG tablet Take 1 tablet by mouth 2 times daily (with meals) 180 tablet 0     cyclobenzaprine (FLEXERIL) 5 MG tablet Take 1-2 tablets (5-10 mg) by mouth 3 times daily as needed for muscle spasms (Patient not taking: Reported on 1/9/2020) 60 tablet 0     sildenafil (VIAGRA) 100 MG tablet Take 1 tablet (100 mg) by mouth daily as needed (Patient not taking: Reported on 12/13/2019) 4 tablet 3     No Known Allergies  No past medical history on file.  No past surgical history on file.   Family History   Problem Relation Age of Onset     Hypertension Mother      Hyperlipidemia Mother      Hyperlipidemia Father      Hypertension Father      Diabetes Brother      Diabetes Sister      Social History     Socioeconomic History     Marital status:      Spouse name: None     Number of children: None     Years of education: None      Highest education level: None   Occupational History     None   Social Needs     Financial resource strain: None     Food insecurity:     Worry: None     Inability: None     Transportation needs:     Medical: None     Non-medical: None   Tobacco Use     Smoking status: Current Every Day Smoker     Types: Cigarettes     Smokeless tobacco: Never Used   Substance and Sexual Activity     Alcohol use: Yes     Drug use: No     Sexual activity: Yes   Lifestyle     Physical activity:     Days per week: None     Minutes per session: None     Stress: None   Relationships     Social connections:     Talks on phone: None     Gets together: None     Attends Congregation service: None     Active member of club or organization: None     Attends meetings of clubs or organizations: None     Relationship status: None     Intimate partner violence:     Fear of current or ex partner: None     Emotionally abused: None     Physically abused: None     Forced sexual activity: None   Other Topics Concern     Parent/sibling w/ CABG, MI or angioplasty before 65F 55M? Not Asked   Social History Narrative     None       REVIEW OF SYSTEMS  =================  C: NEGATIVE for fever, chills, change in weight  I: NEGATIVE for worrisome rashes, moles or lesions  E/M: NEGATIVE for ear, mouth and throat problems  R: NEGATIVE for significant cough or SHORTNESS OF BREATH  CV:  NEGATIVE for chest pain, palpitations or peripheral edema  GI: NEGATIVE for nausea, abdominal pain, heartburn, or change in bowel habits  NEURO: NEGATIVE numbness/weakness  : see HPI  PSYCH: NEGATIVE depression/anxiety  LYmph: no new enlarged lymph nodes  Ortho: no new trauma/movements      Physical Exam:  BP (!) 144/93   Pulse 105   SpO2 96%    Patient is pleasant, in no acute distress, good general condition.  Heart:  negative, PMI normal  Lung: no evidence of respiratory distress    Abdomen: Soft, nondistended, non tender. No masses. No rebound or guarding.   Exam: penis  with dorsal plague.  Testis no masses.  No scrotal skin lesion.  No suprapubic fullness.  Skin: Warm and dry.  No redness.  Neuro: grossly normal  Musculaskeletal: moving all extremities  Psych normal mood and affect  Musculoskeletal  moving all extremities  Hematologic/Lymphatic/Immunologic: normal ant/post cervical, axillary, supraclavicular and inguinal nodes    Assessment/Plan:   Pleasant 59-year-old gentleman with erectile dysfunction and also Perroney disease.  Treatment options discussed at length.  We discussed about vacuum pump, medications, penile injection, and lastly penile prosthesis for his erectile dysfunction.  Patient is interested in penile injection.  I will schedule patient to return for a trial.  Next we discussed etiology of his penile curvature.  Treatment options again discussed which include observation, penile injection and lastly penile prosthesis.    HTN: Amaunel to follow up with Primary Care provider regarding elevated blood pressure.

## 2020-01-09 NOTE — LETTER
"    1/9/2020         RE: Amanuel Mccord  [address]  [facility] [address]        Dear Colleague,    Thank you for referring your patient, Amanuel Mccord, to the [facility]. Please see a copy of my visit note below.    ORTHOPEDIC CONSULT      Chief Complaint: Amanuel Mccord is a 59 year old male who is being seen for   Chief Complaint   Patient presents with     Shoulder Pain     right shoulder pain     Consult       History of Present Illness:   Was being seen by YADIEL Singh now establishing with us.   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 Normally a mild intermittent dull ache, has been worse over the last 6 months. However 4-5 days earlier, fell on the ice, and \"brooks\" the shoulder causing a more acute, sharp, severe pain to the shoulder. Has been unable to raise the shoulder over his head, and has been weak. Also having some anterior shoulder pain, bruising along the proximal bicipital grove and a \"ernestina\" looking arm. No numbness/tingling. No radiating symptoms. The loss of motion and strength is new.   Treatments tried: time, activity modification, rest, aleve (\"12 a day\"), alcohol, icing,. Mobic (60mg a day), muscle relaxer all without benefit        Patient's past medical, surgical, social and family histories reviewed.     No past medical history on file.    No past surgical history on file.    Medications:  amLODIPine-benazepril (LOTREL) 5-20 MG capsule, Take 1 capsule by mouth daily  atorvastatin (LIPITOR) 80 MG tablet, Take 1 tablet (80 mg) by mouth every morning Must keep upcoming appointment for refills.  glimepiride (AMARYL) 1 MG tablet, Take 1 tablet (1 mg) by mouth daily  meloxicam (MOBIC) 15 MG tablet, Take 1 tablet (15 mg) by mouth daily  sitagliptin-metFORMIN (JANUMET)  MG tablet, Take 1 tablet by mouth 2 times daily (with " "meals)  cyclobenzaprine (FLEXERIL) 5 MG tablet, Take 1-2 tablets (5-10 mg) by mouth 3 times daily as needed for muscle spasms (Patient not taking: Reported on 1/9/2020)  sildenafil (VIAGRA) 100 MG tablet, Take 1 tablet (100 mg) by mouth daily as needed (Patient not taking: Reported on 12/13/2019)    No current facility-administered medications on file prior to visit.       No Known Allergies    Social History     Occupational History     Not on file   Tobacco Use     Smoking status: Current Every Day Smoker     Types: Cigarettes     Smokeless tobacco: Never Used   Substance and Sexual Activity     Alcohol use: Yes     Drug use: No     Sexual activity: Yes       Family History   Problem Relation Age of Onset     Hypertension Mother      Hyperlipidemia Mother      Hyperlipidemia Father      Hypertension Father      Diabetes Brother      Diabetes Sister        REVIEW OF SYSTEMS  10 point review systems performed otherwise negative as noted as per history of present illness.    Physical Exam:  Vitals: BP (!) 146/68 (BP Location: Left arm, Patient Position: Chair, Cuff Size: Adult Regular)   Ht 1.803 m (5' 11\")   Wt 91.6 kg (202 lb)   BMI 28.17 kg/m     BMI= Body mass index is 28.17 kg/m .  Constitutional: healthy, alert and no acute distress   Psychiatric: mentation appears normal and affect normal/bright  NEURO: no focal deficits  RESP: Normal with easy respirations and no use of accessory muscles to breathe, no audible wheezing or retractions  CV: RUE:  no edema         Regular rate and rhythm by palpation  SKIN: No erythema, rashes, excoriation, or breakdown. No evidence of infection.   JOINT/EXTREMITIES:right shoulder: ernestina deformity to the right arm, bruising along proximal bicipital grove. Tender to this area. No AC joint tenderness. No atrophy, no other deformity. No effusion.  AROM 60/50/20/side pocket. PROM did not tolerated past 90/90 due to pain. Soft endpoint. Unable to test rotator cuff due to lack of " motion and pain limiting exam. No distal bicipital tenderness, negative hook exam.  Motion to elbow intact.  Distal neurovascular grossly intact   Lymph: no appreciated lymphedema  GAIT: not tested     Diagnostic Modalities:  right shoulder X-ray: Well preserved glenohumeral articular space. Degenerative changes to the AC joint.  No fractures visualized. No fracture, dislocation and or lesion.   Independent visualization of the images was performed.      Impression: right shoulder acute on chronic pain  Proximal bicep tendon rupture  Right shoulder injury with acute onset weakness and lack of motion    Plan:  All of the above pertinent physical exam and imaging modalities findings was reviewed with Amanuel.  Has struggled with progressively worsening right shoulder pain since an injury 15 years ago, then fell on the ice 5 days ago and acute worsening of the symptoms, has loss motion to the shoulder, and strength. Exam was limited due to the lack of AROM and pain but has probable rotator cuff tear with bicep tendon rupture. Discussed options he would like to proceed with an MRI. This is reasonable.   Discussed medication, he was taking way too much Aleve, then prescribed mobic and taking too much of this. Was given muscle relaxer but other than feeling tired did not help pain. He would like to try a different NSAID. Rx for voltaren was given. Also had sung discussion about taking too much NSAIDs and risks to GI bleed, stomach, kidneys.  Referral for MRI provided.     I have prescribed an antiinflammatory medication.  We discussed that it is the same class of some the common over the counter medications (Ibuprofen, Advil, Motrin, Aleve, Naproxen, and  Naprosyn). I recommend to avoid taking these OTC's medication when taking the medication that I prescribed. This medication should be stopped if having stomach issues, bleeding, high blood pressure and/or chest pain.     Return to clinic to discuss test results, or  sooner as needed for changes.  Re-x-ray on return: No    Scribed by:  GAYATHRI Vaughan, CNP  1:37 PM  1/9/2020  The information in this document, created by a scribe for me, accurately reflects the services I personally performed and the decisions made by me. I have reviewed and approved this document for accuracy    Hong Edmond, DO         Again, thank you for allowing me to participate in the care of your patient.        Sincerely,        Hong Edmond DO

## 2020-01-09 NOTE — PROGRESS NOTES
"ORTHOPEDIC CONSULT      Chief Complaint: Amanuel Mccord is a 59 year old male who is being seen for   Chief Complaint   Patient presents with     Shoulder Pain     right shoulder pain     Consult       History of Present Illness:   Was being seen by YADIEL Singh now establishing with us.   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 Normally a mild intermittent dull ache, has been worse over the last 6 months. However 4-5 days earlier, fell on the ice, and \"brooks\" the shoulder causing a more acute, sharp, severe pain to the shoulder. Has been unable to raise the shoulder over his head, and has been weak. Also having some anterior shoulder pain, bruising along the proximal bicipital grove and a \"ernestina\" looking arm. No numbness/tingling. No radiating symptoms. The loss of motion and strength is new.   Treatments tried: time, activity modification, rest, aleve (\"12 a day\"), alcohol, icing,. Mobic (60mg a day), muscle relaxer all without benefit        Patient's past medical, surgical, social and family histories reviewed.     No past medical history on file.    No past surgical history on file.    Medications:  amLODIPine-benazepril (LOTREL) 5-20 MG capsule, Take 1 capsule by mouth daily  atorvastatin (LIPITOR) 80 MG tablet, Take 1 tablet (80 mg) by mouth every morning Must keep upcoming appointment for refills.  glimepiride (AMARYL) 1 MG tablet, Take 1 tablet (1 mg) by mouth daily  meloxicam (MOBIC) 15 MG tablet, Take 1 tablet (15 mg) by mouth daily  sitagliptin-metFORMIN (JANUMET)  MG tablet, Take 1 tablet by mouth 2 times daily (with meals)  cyclobenzaprine (FLEXERIL) 5 MG tablet, Take 1-2 tablets (5-10 mg) by mouth 3 times daily as needed for muscle spasms (Patient not taking: Reported on 1/9/2020)  sildenafil (VIAGRA) 100 MG tablet, Take 1 tablet (100 mg) by mouth daily as needed " "(Patient not taking: Reported on 12/13/2019)    No current facility-administered medications on file prior to visit.       No Known Allergies    Social History     Occupational History     Not on file   Tobacco Use     Smoking status: Current Every Day Smoker     Types: Cigarettes     Smokeless tobacco: Never Used   Substance and Sexual Activity     Alcohol use: Yes     Drug use: No     Sexual activity: Yes       Family History   Problem Relation Age of Onset     Hypertension Mother      Hyperlipidemia Mother      Hyperlipidemia Father      Hypertension Father      Diabetes Brother      Diabetes Sister        REVIEW OF SYSTEMS  10 point review systems performed otherwise negative as noted as per history of present illness.    Physical Exam:  Vitals: BP (!) 146/68 (BP Location: Left arm, Patient Position: Chair, Cuff Size: Adult Regular)   Ht 1.803 m (5' 11\")   Wt 91.6 kg (202 lb)   BMI 28.17 kg/m    BMI= Body mass index is 28.17 kg/m .  Constitutional: healthy, alert and no acute distress   Psychiatric: mentation appears normal and affect normal/bright  NEURO: no focal deficits  RESP: Normal with easy respirations and no use of accessory muscles to breathe, no audible wheezing or retractions  CV: RUE:  no edema         Regular rate and rhythm by palpation  SKIN: No erythema, rashes, excoriation, or breakdown. No evidence of infection.   JOINT/EXTREMITIES:right shoulder: ernestina deformity to the right arm, bruising along proximal bicipital grove. Tender to this area. No AC joint tenderness. No atrophy, no other deformity. No effusion.  AROM 60/50/20/side pocket. PROM did not tolerated past 90/90 due to pain. Soft endpoint. Unable to test rotator cuff due to lack of motion and pain limiting exam. No distal bicipital tenderness, negative hook exam.  Motion to elbow intact.  Distal neurovascular grossly intact   Lymph: no appreciated lymphedema  GAIT: not tested     Diagnostic Modalities:  right shoulder X-ray: Well " preserved glenohumeral articular space. Degenerative changes to the AC joint.  No fractures visualized. No fracture, dislocation and or lesion.   Independent visualization of the images was performed.      Impression: right shoulder acute on chronic pain  Proximal bicep tendon rupture  Right shoulder injury with acute onset weakness and lack of motion    Plan:  All of the above pertinent physical exam and imaging modalities findings was reviewed with Amanuel.  Has struggled with progressively worsening right shoulder pain since an injury 15 years ago, then fell on the ice 5 days ago and acute worsening of the symptoms, has loss motion to the shoulder, and strength. Exam was limited due to the lack of AROM and pain but has probable rotator cuff tear with bicep tendon rupture. Discussed options he would like to proceed with an MRI. This is reasonable.   Discussed medication, he was taking way too much Aleve, then prescribed mobic and taking too much of this. Was given muscle relaxer but other than feeling tired did not help pain. He would like to try a different NSAID. Rx for voltaren was given. Also had sung discussion about taking too much NSAIDs and risks to GI bleed, stomach, kidneys.  Referral for MRI provided.     I have prescribed an antiinflammatory medication.  We discussed that it is the same class of some the common over the counter medications (Ibuprofen, Advil, Motrin, Aleve, Naproxen, and  Naprosyn). I recommend to avoid taking these OTC's medication when taking the medication that I prescribed. This medication should be stopped if having stomach issues, bleeding, high blood pressure and/or chest pain.     Return to clinic to discuss test results, or sooner as needed for changes.  Re-x-ray on return: No    Scribed by:  GAYATHRI Vaughan, CNP  1:37 PM  1/9/2020  The information in this document, created by a scribe for me, accurately reflects the services I personally performed and the decisions made by  me. I have reviewed and approved this document for accuracy    Hong Edmond, DO

## 2020-01-13 ENCOUNTER — HOSPITAL ENCOUNTER (OUTPATIENT)
Dept: MRI IMAGING | Facility: CLINIC | Age: 60
Discharge: HOME | End: 2020-01-13
Attending: NURSE PRACTITIONER | Admitting: NURSE PRACTITIONER
Payer: COMMERCIAL

## 2020-01-13 DIAGNOSIS — S49.91XA INJURY OF RIGHT SHOULDER, INITIAL ENCOUNTER: ICD-10-CM

## 2020-01-13 DIAGNOSIS — S46.211A RUPTURE OF RIGHT PROXIMAL BICEPS TENDON, INITIAL ENCOUNTER: ICD-10-CM

## 2020-01-13 DIAGNOSIS — G89.29 CHRONIC RIGHT SHOULDER PAIN: ICD-10-CM

## 2020-01-13 DIAGNOSIS — M25.511 CHRONIC RIGHT SHOULDER PAIN: ICD-10-CM

## 2020-01-13 PROCEDURE — 73221 MRI JOINT UPR EXTREM W/O DYE: CPT | Mod: RT

## 2020-01-16 ENCOUNTER — OFFICE VISIT (OUTPATIENT)
Dept: ORTHOPEDICS | Facility: CLINIC | Age: 60
End: 2020-01-16
Payer: COMMERCIAL

## 2020-01-16 VITALS
WEIGHT: 202 LBS | HEIGHT: 71 IN | SYSTOLIC BLOOD PRESSURE: 120 MMHG | BODY MASS INDEX: 28.28 KG/M2 | DIASTOLIC BLOOD PRESSURE: 80 MMHG

## 2020-01-16 DIAGNOSIS — M19.019 OSTEOARTHRITIS OF AC (ACROMIOCLAVICULAR) JOINT: ICD-10-CM

## 2020-01-16 DIAGNOSIS — M75.121 COMPLETE TEAR OF RIGHT ROTATOR CUFF, UNSPECIFIED WHETHER TRAUMATIC: ICD-10-CM

## 2020-01-16 DIAGNOSIS — M75.41 SUBACROMIAL IMPINGEMENT OF RIGHT SHOULDER: ICD-10-CM

## 2020-01-16 DIAGNOSIS — S46.211D RUPTURE OF RIGHT PROXIMAL BICEPS TENDON, SUBSEQUENT ENCOUNTER: Primary | ICD-10-CM

## 2020-01-16 PROCEDURE — 99214 OFFICE O/P EST MOD 30 MIN: CPT | Performed by: ORTHOPAEDIC SURGERY

## 2020-01-16 ASSESSMENT — MIFFLIN-ST. JEOR: SCORE: 1753.4

## 2020-01-16 ASSESSMENT — PAIN SCALES - GENERAL: PAINLEVEL: WORST PAIN (10)

## 2020-01-16 NOTE — PROGRESS NOTES
"Office Visit-Follow up    Chief Complaint: Amanuel Mccord is a 59 year old male who is being seen for   Chief Complaint   Patient presents with     RECHECK     mri results of right shoulder       History of Present Illness:   Today's visit  Returns for MRI results. States pain is still bad.  Taking voltaren 1-2 tabs, once to twice a day. Not very helpful. Continues to struggle with pain, with any lifting of the shoulder or use of the arm.   Treatments tried to date: multiple types of NSAIDs with often taking higher than prescribed doses due to pain, rest, activity modification, time, muscle relaxers, icing, occasional alcohol drink to help with the pain  Quality of life: difficulty sleeping, intolerable progressive pain, loss of motion above shoulder level, loss of ability to lift above the head, difficulty with adls. Has had to retire early due to the pain and lack of motion from his job.   1/09/2020 visit  Was being seen by YADIEL Singh now establishing with us.   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 Normally a mild intermittent dull ache, has been worse over the last 6 months. However 4-5 days earlier, fell on the ice, and \"brooks\" the shoulder causing a more acute, sharp, severe pain to the shoulder. Has been unable to raise the shoulder over his head, and has been weak. Also having some anterior shoulder pain, bruising along the proximal bicipital grove and a \"ernestina\" looking arm. No numbness/tingling. No radiating symptoms. The loss of motion and strength is new.   Treatments tried: time, activity modification, rest, aleve (\"12 a day\"), alcohol, icing,. Mobic (60mg a day), muscle relaxer all without benefit       Social History     Occupational History     Not on file   Tobacco Use     Smoking status: Current Every Day Smoker     Types: Cigarettes     Smokeless tobacco: Never Used " "  Substance and Sexual Activity     Alcohol use: Yes     Drug use: No     Sexual activity: Yes       REVIEW OF SYSTEMS  General: negative for, night sweats, dizziness, fatigue  Resp: No shortness of breath and no cough  CV: negative for chest pain, syncope or near-syncope  GI: negative for nausea, vomiting and diarrhea  : negative for dysuria and hematuria  Musculoskeletal: as above  Neurologic: negative for syncope   Hematologic: negative for bleeding disorder    Physical Exam:  Vitals: /80   Ht 1.803 m (5' 11\")   Wt 91.6 kg (202 lb)   BMI 28.17 kg/m    BMI= Body mass index is 28.17 kg/m .  Constitutional: healthy, alert and no acute distress   Psychiatric: mentation appears normal and affect normal/bright  NEURO: no focal deficits  RESP: Normal with easy respirations and no use of accessory muscles to breathe, no audible wheezing or retractions  CV: RUE:  no edema         Regular rate and rhythm by palpation  SKIN: No erythema, rashes, excoriation, or breakdown. No evidence of infection.   JOINT/EXTREMITIES:right shoulder: ernestina deformity to the right arm, bruising along proximal bicipital grove. Bruising is now distal bicep region.  Tender to the proximal area. No distal biceps/antecubital fossa tenderness. Negative hook exam. AC joint tenderness.  No atrophy, no other deformity. No effusion.  AROM 60/50/20/side pocket. PROM did not tolerated past 90/90 due to pain. Soft endpoint. Unable to test rotator cuff due to lack of motion and pain limiting exam.  Distal neurovascular grossly intact              Lymph: no appreciated lymphedema  GAIT: not tested         Diagnostic Modalities:  Right shoulder MRI: large full thickness supraspinatus tendon tear with retraction to the glenohumeral joint. Large full thickness tear of the infraspinatus. No atrophy with supraspinatus and infraspinatus. Large subscapularis tear with retraction and atrophy. Deltoid muscle belly tear. Degenerative changes to the AC " joint. Long head bicep tendon rupture. No significant glenohumeral chondromalacia.   Independent visualization of the images was performed.      Impression: right shoulder: supraspinatus, infraspinatus, subscapularis full thickness tears  Right shoulder deltoid muscle belly tear  Right shoulder painful AC joint arthritis  Right shoulder proximal bicep tendon rupture  Right shoulder subacromial impingement.     Plan:  All of the above pertinent physical exam and imaging modalities findings was reviewed with Amanuel.  Discussed imaging and options. He would like to pursue surgery as he has a significant impact to his quality of life and giving the amount of tearing to the rotator cuff with him limited motion, he would not likely succeed for get benefit from physical therapy and injection. Did discuss with the amount of atrophy on the subscapularis that it may not be amendable to surgery. Do not recommend any intervention on the proximal biceps as it is ruptured and with time and therapy should regain function to the biceps. With the size of the tears possible open repair.    The patient has attempted conservative treatments that include multiple types of NSAIDs with often taking higher than prescribed doses due to pain, rest, activity modification, time, muscle relaxers, icing, occasional alcohol drink to help with the jatin yet still continues to have significant issues. These issues include difficulty sleeping, intolerable progressive pain, loss of motion above shoulder level, loss of ability to lift above the head, difficulty with adls. Has had to retire early due to the pain and lack of motion from his job.  Secondary to these reasons I have offered surgery in the form of RIGHT shoulder arthroscopy with subacromial decompression, partial acromioplasty, distal clavicle excision and rotator cuff repair possible open.      The risks, benefits, alternatives, complication, limitations, and expectations were reviewed at  length. Complication such as infection, bleeding, fracture, blood clots, cartilage injury, hardware failure, tendon re-tears, and nerve damage was reviewed. Surgery to be done under general anesthesia.     With any repair of a torn rotator cuff and/or labrum/SLAP, limitation would be in place post-operatively. This generally includes no active moving/lifting arm for 4 weeks, no overhead lifting for 8 weeks, no lifting weights for 12-16 weeks after surgery. Return to full unrestricted activity would be about approximately 16-20 weeks after surgery. These are a general time reference and may be changed depending on the pathology and fixation.     All questions were answered.Together through a combined decision making approach we have decided on a shoulder arthroscopy and the above listed procedures.    Discussed voltaren dosing and again recommended 1 tablet 2 x daily.     The patient's past medical and surgical history was reviewed; The patient will need a preoperative medical evaluation and clearance.    Patient will call when he would like to proceed with surgery. Will place order at that time.     Return to clinic 10 days post-operatively. , or sooner as needed for changes.  Re-x-ray on return: No    Scribed by:  GAYATHRI Vaughan, CNP  11:15 AM  1/16/2020  The information in this document, created by a scribe for me, accurately reflects the services I personally performed and the decisions made by me. I have reviewed and approved this document for accuracy      Joel Edmond D.O.

## 2020-01-16 NOTE — LETTER
"    1/16/2020         RE: Amanuel Mccord  [address]  [facility] [id number]        Dear Colleague,    Thank you for referring your patient, Amanuel Mccord, to the [facility]. Please see a copy of my visit note below.    Office Visit-Follow up    Chief Complaint: Amanuel Mccord is a 59 year old male who is being seen for   Chief Complaint   Patient presents with     RECHECK     mri results of right shoulder       History of Present Illness:   Today's visit  Returns for MRI results. States pain is still bad.  Taking voltaren 1-2 tabs, once to twice a day. Not very helpful. Continues to struggle with pain, with any lifting of the shoulder or use of the arm.   Treatments tried to date: multiple types of NSAIDs with often taking higher than prescribed doses due to pain, rest, activity modification, time, muscle relaxers, icing, occasional alcohol drink to help with the pain  Quality of life: difficulty sleeping, intolerable progressive pain, loss of motion above shoulder level, loss of ability to lift above the head, difficulty with adls. Has had to retire early due to the pain and lack of motion from his job.   1/09/2020 visit  Was being seen by YADIEL Singh now establishing with us.   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 Normally a mild intermittent dull ache, has been worse over the last 6 months. However 4-5 days earlier, fell on the ice, and \"brooks\" the shoulder causing a more acute, sharp, severe pain to the shoulder. Has been unable to raise the shoulder over his head, and has been weak. Also having some anterior shoulder pain, bruising along the proximal bicipital grove and a \"ernestina\" looking arm. No numbness/tingling. No radiating symptoms. The loss of motion and strength is new.   Treatments tried: time, activity modification, rest, aleve (\"12 a day\"), " "alcohol, icing,. Mobic (60mg a day), muscle relaxer all without benefit       Social History     Occupational History     Not on file   Tobacco Use     Smoking status: Current Every Day Smoker     Types: Cigarettes     Smokeless tobacco: Never Used   Substance and Sexual Activity     Alcohol use: Yes     Drug use: No     Sexual activity: Yes       REVIEW OF SYSTEMS  General: negative for, night sweats, dizziness, fatigue  Resp: No shortness of breath and no cough  CV: negative for chest pain, syncope or near-syncope  GI: negative for nausea, vomiting and diarrhea  : negative for dysuria and hematuria  Musculoskeletal: as above  Neurologic: negative for syncope   Hematologic: negative for bleeding disorder    Physical Exam:  Vitals: /80   Ht 1.803 m (5' 11\")   Wt 91.6 kg (202 lb)   BMI 28.17 kg/m     BMI= Body mass index is 28.17 kg/m .  Constitutional: healthy, alert and no acute distress   Psychiatric: mentation appears normal and affect normal/bright  NEURO: no focal deficits  RESP: Normal with easy respirations and no use of accessory muscles to breathe, no audible wheezing or retractions  CV: RUE:  no edema         Regular rate and rhythm by palpation  SKIN: No erythema, rashes, excoriation, or breakdown. No evidence of infection.   JOINT/EXTREMITIES:right shoulder: ernestina deformity to the right arm, bruising along proximal bicipital grove. Bruising is now distal bicep region.  Tender to the proximal area. No distal biceps/antecubital fossa tenderness. Negative hook exam. AC joint tenderness.  No atrophy, no other deformity. No effusion.  AROM 60/50/20/side pocket. PROM did not tolerated past 90/90 due to pain. Soft endpoint. Unable to test rotator cuff due to lack of motion and pain limiting exam.  Distal neurovascular grossly intact              Lymph: no appreciated lymphedema  GAIT: not tested         Diagnostic Modalities:  Right shoulder MRI: large full thickness supraspinatus tendon tear with " retraction to the glenohumeral joint. Large full thickness tear of the infraspinatus. No atrophy with supraspinatus and infraspinatus. Large subscapularis tear with retraction and atrophy. Deltoid muscle belly tear. Degenerative changes to the AC joint. Long head bicep tendon rupture. No significant glenohumeral chondromalacia.   Independent visualization of the images was performed.      Impression: right shoulder: supraspinatus, infraspinatus, subscapularis full thickness tears  Right shoulder deltoid muscle belly tear  Right shoulder painful AC joint arthritis  Right shoulder proximal bicep tendon rupture  Right shoulder subacromial impingement.     Plan:  All of the above pertinent physical exam and imaging modalities findings was reviewed with Amanuel.  Discussed imaging and options. He would like to pursue surgery as he has a significant impact to his quality of life and giving the amount of tearing to the rotator cuff with him limited motion, he would not likely succeed for get benefit from physical therapy and injection. Did discuss with the amount of atrophy on the subscapularis that it may not be amendable to surgery. Do not recommend any intervention on the proximal biceps as it is ruptured and with time and therapy should regain function to the biceps. With the size of the tears possible open repair.    The patient has attempted conservative treatments that include multiple types of NSAIDs with often taking higher than prescribed doses due to pain, rest, activity modification, time, muscle relaxers, icing, occasional alcohol drink to help with the jatin yet still continues to have significant issues. These issues include difficulty sleeping, intolerable progressive pain, loss of motion above shoulder level, loss of ability to lift above the head, difficulty with adls. Has had to retire early due to the pain and lack of motion from his job.  Secondary to these reasons I have offered surgery in the form of  RIGHT shoulder arthroscopy with subacromial decompression, partial acromioplasty, distal clavicle excision and rotator cuff repair possible open.      The risks, benefits, alternatives, complication, limitations, and expectations were reviewed at length. Complication such as infection, bleeding, fracture, blood clots, cartilage injury, hardware failure, tendon re-tears, and nerve damage was reviewed. Surgery to be done under general anesthesia.     With any repair of a torn rotator cuff and/or labrum/SLAP, limitation would be in place post-operatively. This generally includes no active moving/lifting arm for 4 weeks, no overhead lifting for 8 weeks, no lifting weights for 12-16 weeks after surgery. Return to full unrestricted activity would be about approximately 16-20 weeks after surgery. These are a general time reference and may be changed depending on the pathology and fixation.     All questions were answered.Together through a combined decision making approach we have decided on a shoulder arthroscopy and the above listed procedures.    Discussed voltaren dosing and again recommended 1 tablet 2 x daily.     The patient's past medical and surgical history was reviewed; The patient will need a preoperative medical evaluation and clearance.    Patient will call when he would like to proceed with surgery. Will place order at that time.     Return to clinic 10 days post-operatively. , or sooner as needed for changes.  Re-x-ray on return: No    Scribed by:  GAYATHRI Vaughan, CNP  11:15 AM  1/16/2020  The information in this document, created by a scribe for me, accurately reflects the services I personally performed and the decisions made by me. I have reviewed and approved this document for accuracy      Joel Edmond D.O.        Again, thank you for allowing me to participate in the care of your patient.        Sincerely,        Hong Edmond, DO

## 2020-01-17 ENCOUNTER — TELEPHONE (OUTPATIENT)
Dept: ORTHOPEDICS | Facility: CLINIC | Age: 60
End: 2020-01-17

## 2020-01-17 DIAGNOSIS — M19.019 OSTEOARTHRITIS OF AC (ACROMIOCLAVICULAR) JOINT: ICD-10-CM

## 2020-01-17 DIAGNOSIS — M75.41 SUBACROMIAL IMPINGEMENT OF RIGHT SHOULDER: ICD-10-CM

## 2020-01-17 DIAGNOSIS — M75.121 COMPLETE TEAR OF RIGHT ROTATOR CUFF, UNSPECIFIED WHETHER TRAUMATIC: Primary | ICD-10-CM

## 2020-01-23 NOTE — TELEPHONE ENCOUNTER
Surgery Scheduled   Date of Surgery 02/24/20 Time of Surgery    Procedure: Right Shoulder arthroscopy with rotator cuff repair, subacromial decompression with partial acromioplasty, distal clavicle excision possible open procedure (Right)    Hospital/Surgical Facility:  [facility]   Surgeon: Dr. Edmond   Type of Anesthesia :  general   Pre-op  with    2 week post op:03/09 with Dr. Edmond   6 week post op: with      Surgery packet mailed to patient's home address. Patients instructed to arrive  hours prior to surgery. Patient understood and agrees to plan.  Farrah Rutledge

## 2020-01-23 NOTE — TELEPHONE ENCOUNTER
Reason for Call:  Same Day Appointment, Requested Provider:  Gregory G. Schoen, MD    PCP: Schoen, Gregory G    Reason for visit: Preop- surgery on 02/24 with Dr. Edmond    Duration of symptoms: none    Have you been treated for this in the past? Yes    Additional comments:  none    Can we leave a detailed message on this number? YES    Phone number patient can be reached at: Home number on file [phone] (home)    Best Time: any    Call taken on 1/23/2020 at 8:25 AM by Farrah Rutledge

## 2020-01-24 NOTE — TELEPHONE ENCOUNTER
Okay to schedule in any same day slot available prior to his surgery date.   Electronically signed by Greg Schoen, MD

## 2020-01-29 ENCOUNTER — OFFICE VISIT (OUTPATIENT)
Dept: UROLOGY | Facility: OTHER | Age: 60
End: 2020-01-29
Payer: COMMERCIAL

## 2020-01-29 VITALS
BODY MASS INDEX: 28.17 KG/M2 | DIASTOLIC BLOOD PRESSURE: 80 MMHG | WEIGHT: 202 LBS | HEART RATE: 80 BPM | SYSTOLIC BLOOD PRESSURE: 136 MMHG

## 2020-01-29 DIAGNOSIS — N48.6 PEYRONIE'S DISEASE: ICD-10-CM

## 2020-01-29 PROCEDURE — 54235 NJX CORPORA CAVERNOSA RX AGT: CPT | Performed by: UROLOGY

## 2020-01-29 PROCEDURE — 99213 OFFICE O/P EST LOW 20 MIN: CPT | Mod: 25 | Performed by: UROLOGY

## 2020-01-29 ASSESSMENT — PAIN SCALES - GENERAL: PAINLEVEL: NO PAIN (0)

## 2020-01-29 NOTE — PROGRESS NOTES
Chief Complaint   Patient presents with     RECHECK     Perroney disease       Amanuel Mccord is a 59 year old male who presents today for follow up of   Chief Complaint   Patient presents with     RECHECK     Perroney disease    f/u for ED/Peyronie.  He is interested in trying out penile injection.      Current Outpatient Medications   Medication Sig Dispense Refill     amLODIPine-benazepril (LOTREL) 5-20 MG capsule Take 1 capsule by mouth daily 90 capsule 0     atorvastatin (LIPITOR) 80 MG tablet Take 1 tablet (80 mg) by mouth every morning Must keep upcoming appointment for refills. 90 tablet 0     diclofenac (VOLTAREN) 75 MG EC tablet Take 1 tablet (75 mg) by mouth 2 times daily as needed for moderate pain 30 tablet 1     glimepiride (AMARYL) 1 MG tablet Take 1 tablet (1 mg) by mouth daily 90 tablet 0     sitagliptin-metFORMIN (JANUMET)  MG tablet Take 1 tablet by mouth 2 times daily (with meals) 180 tablet 0     cyclobenzaprine (FLEXERIL) 5 MG tablet Take 1-2 tablets (5-10 mg) by mouth 3 times daily as needed for muscle spasms (Patient not taking: Reported on 1/29/2020) 60 tablet 0     sildenafil (VIAGRA) 100 MG tablet Take 1 tablet (100 mg) by mouth daily as needed (Patient not taking: Reported on 12/13/2019) 4 tablet 3     No Known Allergies   History reviewed. No pertinent past medical history.  History reviewed. No pertinent surgical history.  Family History   Problem Relation Age of Onset     Hypertension Mother      Hyperlipidemia Mother      Hyperlipidemia Father      Hypertension Father      Diabetes Brother      Diabetes Sister      Social History     Socioeconomic History     Marital status:      Spouse name: None     Number of children: None     Years of education: None     Highest education level: None   Occupational History     None   Social Needs     Financial resource strain: None     Food insecurity:     Worry: None     Inability: None     Transportation needs:     Medical: None      Non-medical: None   Tobacco Use     Smoking status: Current Every Day Smoker     Types: Cigarettes     Smokeless tobacco: Never Used   Substance and Sexual Activity     Alcohol use: Yes     Drug use: No     Sexual activity: Yes   Lifestyle     Physical activity:     Days per week: None     Minutes per session: None     Stress: None   Relationships     Social connections:     Talks on phone: None     Gets together: None     Attends Temple service: None     Active member of club or organization: None     Attends meetings of clubs or organizations: None     Relationship status: None     Intimate partner violence:     Fear of current or ex partner: None     Emotionally abused: None     Physically abused: None     Forced sexual activity: None   Other Topics Concern     Parent/sibling w/ CABG, MI or angioplasty before 65F 55M? Not Asked   Social History Narrative     None       REVIEW OF SYSTEMS  =================  C: NEGATIVE for fever, chills, change in weight  I: NEGATIVE for worrisome rashes, moles or lesions  E/M: NEGATIVE for ear, mouth and throat problems  R: NEGATIVE for significant cough or SHORTNESS OF BREATH  CV:  NEGATIVE for chest pain, palpitations or peripheral edema  GI: NEGATIVE for nausea, abdominal pain, heartburn, or change in bowel habits  NEURO: NEGATIVE numbness/weakness  : see HPI  PSYCH: NEGATIVE depression/anxiety  LYmph: no new enlarged lymph nodes  Ortho: no new trauma/movements    Physical Exam:  /80   Pulse 80   Wt 91.6 kg (202 lb)   BMI 28.17 kg/m     Patient is pleasant, in no acute distress, good general condition.  Lung: no evidence of respiratory distress    Abdomen: Soft, nondistended, non tender. No masses. No rebound or guarding.   Exam: 20 mcg of edex injected with good erection.  There was an upward curvation of his penile.  Skin: Warm and dry.  No redness.  Psych: normal mood and affect  Neuro: alert and oriented  Musculaskeletal: moving all  extremities    Assessment/Plan:   (N52.9) Male impotence  (primary encounter diagnosis)  Comment:  Good response to edex  Plan: INJECTION CORPORA CAVERNOSA W PHRM AGNT,         alprostadil (EDEX) kit 20 mcg         Risks of scar and priapism discussed.    (N48.6) Peyronie's disease  Comment:    Plan: discussed penile prosthesis to take care of both ED and penile curvature if penetration not possible.

## 2020-01-29 NOTE — PATIENT INSTRUCTIONS
One time free shipping code [id number].     You will receive a call from [facility] later today for billing and shipping information.

## 2020-02-07 DIAGNOSIS — N52.1 ERECTILE DYSFUNCTION DUE TO DISEASES CLASSIFIED ELSEWHERE: ICD-10-CM

## 2020-02-07 RX ORDER — SILDENAFIL 100 MG/1
TABLET, FILM COATED ORAL
Qty: 4 TABLET | Refills: 3 | Status: SHIPPED | OUTPATIENT
Start: 2020-02-07 | End: 2021-10-08

## 2020-02-07 NOTE — TELEPHONE ENCOUNTER
"Sildenafil  Last Written Prescription Date:  12/26/2018  Last Fill Quantity: 4,  # refills: 3   Last office visit: 12/13/2019 with prescribing provider:  Schoen   Future Office Visit:   Next 5 appointments (look out 90 days)    Feb 14, 2020  3:30 PM CST  [facility] G [facility] [address] [place] [id number]  [phone]   Mar 09, 2020  9:30 AM CDT  Return Visit with Hong Edmond DO  [facility] [address] [place] [id number]  [phone]      Prescription [place] per [facility] [facility].    Requested Prescriptions   Pending Prescriptions Disp Refills     sildenafil (VIAGRA) 100 MG tablet [Pharmacy Med Name: SILDENAFIL CITRATE 100MG TABS] 4 tablet 3     Sig: TAKE ONE TABLET BY MOUTH DAILY AS NEEDED       Erectile Dysfuction Protocol Passed - 2/7/2020  9:13 AM        Passed - Absence of nitrates on medication list        Passed - Absence of Alpha Blockers on Med list        Passed - Recent (12 mo) or future (30 days) visit within the authorizing provider's specialty     Patient has had an office visit with the authorizing provider or a provider within the authorizing providers department within the previous 12 mos or has a future within next 30 days. See \"Patient Info\" tab in inbasket, or \"Choose Columns\" in Meds & Orders section of the refill encounter.          Passed - Medication is active on med list        Passed - Patient is age 18 or older      Mary Ellen Greene RN   "

## 2020-02-10 DIAGNOSIS — S40.011A CONTUSION OF RIGHT SHOULDER, INITIAL ENCOUNTER: ICD-10-CM

## 2020-02-10 DIAGNOSIS — S46.211A BICEPS RUPTURE, PROXIMAL, RIGHT, INITIAL ENCOUNTER: ICD-10-CM

## 2020-02-10 DIAGNOSIS — G56.21 IRRITATION OF RIGHT ULNAR NERVE: ICD-10-CM

## 2020-02-10 NOTE — TELEPHONE ENCOUNTER
Chief Complaint   Patient presents with     Refill Request      Refill request received via fax by pharmacy   [facility] - [place], [place] - [address] tel:[phone]      Pending Prescriptions:                       Disp   Refills    cyclobenzaprine (FLEXERIL) 5 MG tablet    60 tab*0            Sig: Take 1-2 tablets (5-10 mg) by mouth 3 times daily           as needed for muscle spasms         Last Written Prescription Date:  01/06/20  Last Fill Quantity: 60,   # refills: 0  Last Office Visit with prescribing provider: 1/16/2020  Future Office visit:    Next 5 appointments (look out 90 days)    Feb 14, 2020  3:30 PM CST  Pre-Op physical with Gregory G Schoen, MD  [facility] [address] [place] [id number] [phone]   Mar 09, 2020  9:30 AM CDT  Return Visit with Hong Edmond DO  [facility] ([facility]) [address] [place] [id number] [phone]           Coby Case [facility]

## 2020-02-11 ENCOUNTER — TELEPHONE (OUTPATIENT)
Dept: ORTHOPEDICS | Facility: OTHER | Age: 60
End: 2020-02-11

## 2020-02-11 RX ORDER — CYCLOBENZAPRINE HCL 5 MG
5-10 TABLET ORAL 3 TIMES DAILY PRN
Qty: 60 TABLET | Refills: 0 | Status: ON HOLD | OUTPATIENT
Start: 2020-02-11 | End: 2020-02-24

## 2020-02-11 NOTE — TELEPHONE ENCOUNTER
cyclobenzaprine   Last Written Prescription Date: 01/06/2020  Last Fill Quantity: 60,  # refills: 0   Last office visit: 1/16/2020 with prescribing provider:     Future Office Visit:   Next 5 appointments (look out 90 days)    Feb 14, 2020  3:30 PM CST  Pre-Op physical with Gregory G [facility], [facility]  [facility] [address] [place] [phone]  [phone]   Mar 09, 2020  9:30 AM CDT  Return Visit with Hong Edmond DO  [facility] [address] [place] [id number]  [phone]           There are no medications in this encounter.           Lazarus Carbone MA on 2/11/2020 at 12:53 PM

## 2020-02-14 ENCOUNTER — OFFICE VISIT (OUTPATIENT)
Dept: FAMILY MEDICINE | Facility: CLINIC | Age: 60
End: 2020-02-14
Payer: COMMERCIAL

## 2020-02-14 VITALS
RESPIRATION RATE: 16 BRPM | SYSTOLIC BLOOD PRESSURE: 120 MMHG | HEIGHT: 71 IN | WEIGHT: 204.4 LBS | DIASTOLIC BLOOD PRESSURE: 70 MMHG | BODY MASS INDEX: 28.61 KG/M2 | OXYGEN SATURATION: 98 % | TEMPERATURE: 98.3 F | HEART RATE: 100 BPM

## 2020-02-14 DIAGNOSIS — E11.40 TYPE 2 DIABETES MELLITUS WITH DIABETIC NEUROPATHY, WITHOUT LONG-TERM CURRENT USE OF INSULIN (H): ICD-10-CM

## 2020-02-14 DIAGNOSIS — M25.511 CHRONIC RIGHT SHOULDER PAIN: ICD-10-CM

## 2020-02-14 DIAGNOSIS — Z01.818 PREOP GENERAL PHYSICAL EXAM: Primary | ICD-10-CM

## 2020-02-14 DIAGNOSIS — M75.121 COMPLETE TEAR OF RIGHT ROTATOR CUFF, UNSPECIFIED WHETHER TRAUMATIC: ICD-10-CM

## 2020-02-14 DIAGNOSIS — H50.9 STRABISMUS: ICD-10-CM

## 2020-02-14 DIAGNOSIS — G89.29 CHRONIC RIGHT SHOULDER PAIN: ICD-10-CM

## 2020-02-14 DIAGNOSIS — I10 BENIGN ESSENTIAL HYPERTENSION: ICD-10-CM

## 2020-02-14 PROCEDURE — 99215 OFFICE O/P EST HI 40 MIN: CPT | Performed by: FAMILY MEDICINE

## 2020-02-14 PROCEDURE — 93000 ELECTROCARDIOGRAM COMPLETE: CPT | Performed by: FAMILY MEDICINE

## 2020-02-14 ASSESSMENT — PAIN SCALES - GENERAL: PAINLEVEL: WORST PAIN (10)

## 2020-02-14 ASSESSMENT — MIFFLIN-ST. JEOR: SCORE: 1764.28

## 2020-02-14 NOTE — PROGRESS NOTES
[address] [address]  [phone]  Dept: [phone]    PRE-OP EVALUATION:  Today's date: 2020    Amanuel Mccord (: 1960) presents for pre-operative evaluation assessment as requested by Dr. Edmond.  He requires evaluation and anesthesia risk assessment prior to undergoing surgery/procedure for treatment of right shoulder arthroscopy .    Fax number for surgical facility:   Primary Physician: Schoen, Gregory G  Type of Anesthesia Anticipated: to be determined    Patient has a Health Care Directive or Living Will:  NO    Preop Questions 2020   Who is doing your surgery? Dr Edmond   What are you having done? Rotator cuff   Date of Surgery/Procedure: 20   Facility or Hospital where procedure/surgery will be performed: [facility]   1.  Do you have a history of Heart attack, stroke, stent, coronary bypass surgery, or other heart surgery? No   2.  Do you ever have any pain or discomfort in your chest? No   3.  Do you have a history of  Heart Failure? No   4.   Are you troubled by shortness of breath when:  walking on a level surface, or up a slight hill, or at night? No   5.  Do you currently have a cold, bronchitis or other respiratory infection? No   6.  Do you have a cough, shortness of breath, or wheezing? No   7.  Do you sometimes get pains in the calves of your legs when you walk? No   8. Do you or anyone in your family have previous history of blood clots? No   9.  Do you or does anyone in your family have a serious bleeding problem such as prolonged bleeding following surgeries or cuts? No   10. Have you ever had problems with anemia or been told to take iron pills? No   11. Have you had any abnormal blood loss such as black, tarry or bloody stools? No   12. Have you ever had a blood transfusion? No   13. Have you or any of your relatives ever had problems with anesthesia? No   14. Do you have sleep apnea, excessive snoring or daytime  drowsiness? No   15. Do you have any prosthetic heart valves? No   16. Do you have prosthetic joints? YES - left hip         HPI:     HPI related to upcoming procedure: Right shoulder pain with nonresponse to conservative therapy. This has been a chronic issue and more recently he also suffered a biceps tear on that same side which is limiting motion. It is not clear from patient if this will or will not be addressed with his surgery but he didn't think so.  This was noted that the plan was to do a lap repair of the rotator cuff tears, possibly open and not to repair the biceps rupture.  Patients goal is really about pain management and says he can deal with decreased functionality.     He has well controlled diabetes, although not diligent about tracking blood sugars.  A1c levels have been consistently under 7% and does not report episodes of hypoglycemia.         MEDICAL HISTORY:     Patient Active Problem List    Diagnosis Date Noted     Complete tear of right rotator cuff, unspecified whether traumatic 01/16/2020     Priority: Medium     Osteoarthritis of AC (acromioclavicular) joint 01/16/2020     Priority: Medium     Subacromial impingement of right shoulder 01/16/2020     Priority: Medium     Rupture of right proximal biceps tendon, initial encounter 01/09/2020     Priority: Medium     Chronic right shoulder pain 01/09/2020     Priority: Medium     Injury of right shoulder, initial encounter 01/09/2020     Priority: Medium     Type 2 diabetes mellitus with diabetic neuropathy, without long-term current use of insulin (H) 05/25/2018     Priority: Medium     Hyperlipidemia LDL goal <100 05/25/2018     Priority: Medium     Benign essential hypertension 05/25/2018     Priority: Medium     Erectile dysfunction due to diseases classified elsewhere 05/25/2018     Priority: Medium      No past medical history on file.  No past surgical history on file.  Current Outpatient Medications   Medication Sig Dispense Refill      amLODIPine-benazepril (LOTREL) 5-20 MG capsule Take 1 capsule by mouth daily 90 capsule 0     atorvastatin (LIPITOR) 80 MG tablet Take 1 tablet (80 mg) by mouth every morning Must keep upcoming appointment for refills. 90 tablet 0     cyclobenzaprine (FLEXERIL) 5 MG tablet Take 1-2 tablets (5-10 mg) by mouth 3 times daily as needed for muscle spasms 60 tablet 0     diclofenac (VOLTAREN) 75 MG EC tablet Take 1 tablet (75 mg) by mouth 2 times daily as needed for moderate pain 30 tablet 1     glimepiride (AMARYL) 1 MG tablet Take 1 tablet (1 mg) by mouth daily 90 tablet 0     JANUMET  MG tablet TAKE ONE TABLET BY MOUTH TWICE A DAY WITH MEALS 180 tablet 3     sildenafil (VIAGRA) 100 MG tablet TAKE ONE TABLET BY MOUTH DAILY AS NEEDED 4 tablet 3     OTC products: Aspirin    No Known Allergies   Latex Allergy: NO    Social History     Tobacco Use     Smoking status: Current Every Day Smoker     Types: Cigarettes     Smokeless tobacco: Never Used   Substance Use Topics     Alcohol use: Yes     History   Drug Use No       REVIEW OF SYSTEMS:   CONSTITUTIONAL: NEGATIVE for fever, chills, change in weight  INTEGUMENTARY/SKIN: NEGATIVE for worrisome rashes, moles or lesions  EYES: NEGATIVE for vision changes or irritation  ENT/MOUTH: NEGATIVE for ear, mouth and throat problems  RESP: NEGATIVE for significant cough or SOB  CV: NEGATIVE for chest pain, palpitations or peripheral edema  GI: NEGATIVE for nausea, abdominal pain, heartburn, or change in bowel habits  : NEGATIVE for frequency, dysuria, or hematuria  MUSCULOSKELETAL: right shoulder issues as noted above. NEURO: NEGATIVE for weakness, dizziness or paresthesias (other than right shoulder)   ENDOCRINE: NEGATIVE for temperature intolerance, skin/hair changes. Diabetes has been stable.   HEME: NEGATIVE for bleeding problems  PSYCHIATRIC: NEGATIVE for changes in mood or affect    EXAM:   /70 (BP Location: Right arm, Patient Position: Chair, Cuff Size: Adult  "Large)   Pulse 100   Temp 98.3  F (36.8  C) (Temporal)   Resp 16   Ht 1.803 m (5' 11\")   Wt 92.7 kg (204 lb 6.4 oz)   SpO2 98%   BMI 28.51 kg/m      GENERAL APPEARANCE: healthy, alert and no distress     EYES: left eye \"wanders\" secondary to lazy/weak eye uncorrected despite surgery in the past     HENT: ear canals and TM's normal and nose and mouth without ulcers or lesions     NECK: no adenopathy, no asymmetry, masses, or scars and thyroid normal to palpation     RESP: lungs clear to auscultation - no rales, rhonchi or wheezes     CV: regular rates and rhythm, normal S1 S2, no S3 or S4 and no murmur, click or rub     ABDOMEN:  soft, nontender, no HSM or masses and bowel sounds normal     MS: Limited ROM of right shoulder secondary to pain and weakness and multiple rotator cuff tears as demonstrated with MRI.     SKIN: no suspicious lesions or rashes     NEURO: Normal strength and tone, sensory exam grossly normal, mentation intact and speech normal     PSYCH: mentation appears normal. and affect normal/bright     LYMPHATICS: No cervical adenopathy    DIAGNOSTICS:   EKG: to my interpretation appears normal, NSR, normal axis, normal intervals, no acute ST/T changes c/w ischemia, no LVH by voltage criteria, unchanged from previous tracings    Recent Labs   Lab Test 12/20/19  0908 07/12/19  1447 04/19/19  0810     --  138   POTASSIUM 4.6  --  4.3   CR 0.74  --  0.77   A1C 6.7* 6.6* 6.9*        IMPRESSION:   Reason for surgery/procedure: Complete tears of right rotator cuff tendons and failed aggressive conservative therapy with persistent pain and limitation of function.   Diagnosis/reason for consult: assessment for tolerance of procedure and anesthesia    The proposed surgical procedure is considered INTERMEDIATE risk.    REVISED CARDIAC RISK INDEX  The patient has the following serious cardiovascular risks for perioperative complications such as (MI, PE, VFib and 3  AV Block):  No serious cardiac " risks  INTERPRETATION: 0 risks: Class I (very low risk - 0.4% complication rate)    The patient has the following additional risks for perioperative complications:  No identified additional risks      ICD-10-CM    1. Preop general physical exam Z01.818 EKG 12-lead complete w/read - Clinics   2. Chronic right shoulder pain M25.511     G89.29    3. Type 2 diabetes mellitus with diabetic neuropathy, without long-term current use of insulin (H) E11.40 EKG 12-lead complete w/read - Clinics   4. Benign essential hypertension I10 EKG 12-lead complete w/read - Clinics   5. Complete tear of right rotator cuff, unspecified whether traumatic M75.121    6. left eye H50.9        RECOMMENDATIONS:         --Patient is to hold all scheduled medications on the day of surgery as he has a combination diabetes medication and with low sugars in general, has increased risk for hypoglycemia if he takes any of them.  He also takes ACE-I and will take that the AM the day before surgery but not the morning of, so not within 24 hours of surgery.      He will stop taking both voltaren and his daily 81mg asa a week before surgery and both of these can be resumed post operative day 1.  Follow up after surgery per orthopedic recommendations.  He will follow up with me in 4 months for a routine diabetes follow up exam.     APPROVAL GIVEN to proceed with proposed procedure, without further diagnostic evaluation       Signed Electronically by: Gregory G. Schoen, MD    Copy of this evaluation report is provided to requesting physician.    [facility] Preop Guidelines    Revised Cardiac Risk Index

## 2020-02-14 NOTE — PATIENT INSTRUCTIONS
On day of surgery, do not take your lisinopril/hctz or your diabetes medications.     Stop taking aspirin and diclofenec a week before surgery to reduce risk of bleeding.    Before Your Surgery      Call your surgeon if there is any change in your health. This includes signs of a cold or flu (such as a sore throat, runny nose, cough, rash or fever).    Do not smoke, drink alcohol or take over the counter medicine (unless your surgeon or primary care doctor tells you to) for the 24 hours before and after surgery.    If you take prescribed drugs: Follow your doctor s orders about which medicines to take and which to stop until after surgery.    Eating and drinking prior to surgery: follow the instructions from your surgeon    Take a shower or bath the night before surgery. Use the soap your surgeon gave you to gently clean your skin. If you do not have soap from your surgeon, use your regular soap. Do not shave or scrub the surgery site.  Wear clean pajamas and have clean sheets on your bed.

## 2020-02-14 NOTE — NURSING NOTE
Health Maintenance reviewed - Colonoscopy-declining at this time with his surgery coming up.  Vaccines-declining at this time, maybe next visit.  Eye exam he said he discussed with Dr. Schoen and he will do here in the future.  Mandy Thomas, CMA

## 2020-02-16 PROBLEM — H50.9 STRABISMUS: Status: ACTIVE | Noted: 2020-02-16

## 2020-02-23 ENCOUNTER — ANESTHESIA EVENT (OUTPATIENT)
Dept: SURGERY | Facility: CLINIC | Age: 60
End: 2020-02-23
Payer: COMMERCIAL

## 2020-02-24 ENCOUNTER — ANESTHESIA (OUTPATIENT)
Dept: SURGERY | Facility: CLINIC | Age: 60
End: 2020-02-24
Payer: COMMERCIAL

## 2020-02-24 ENCOUNTER — ANCILLARY PROCEDURE (OUTPATIENT)
Dept: ULTRASOUND IMAGING | Facility: CLINIC | Age: 60
End: 2020-02-24
Attending: NURSE ANESTHETIST, CERTIFIED REGISTERED
Payer: COMMERCIAL

## 2020-02-24 ENCOUNTER — HOSPITAL ENCOUNTER (OUTPATIENT)
Facility: CLINIC | Age: 60
Discharge: HOME | End: 2020-02-24
Attending: ORTHOPAEDIC SURGERY | Admitting: ORTHOPAEDIC SURGERY
Payer: COMMERCIAL

## 2020-02-24 VITALS
SYSTOLIC BLOOD PRESSURE: 161 MMHG | RESPIRATION RATE: 18 BRPM | OXYGEN SATURATION: 95 % | TEMPERATURE: 98.7 F | DIASTOLIC BLOOD PRESSURE: 94 MMHG | HEART RATE: 85 BPM

## 2020-02-24 DIAGNOSIS — G89.29 CHRONIC RIGHT SHOULDER PAIN: ICD-10-CM

## 2020-02-24 DIAGNOSIS — S46.211A RUPTURE OF RIGHT PROXIMAL BICEPS TENDON, INITIAL ENCOUNTER: ICD-10-CM

## 2020-02-24 DIAGNOSIS — M75.121 COMPLETE TEAR OF RIGHT ROTATOR CUFF, UNSPECIFIED WHETHER TRAUMATIC: ICD-10-CM

## 2020-02-24 DIAGNOSIS — M25.511 CHRONIC RIGHT SHOULDER PAIN: ICD-10-CM

## 2020-02-24 DIAGNOSIS — M19.019 OSTEOARTHRITIS OF AC (ACROMIOCLAVICULAR) JOINT: ICD-10-CM

## 2020-02-24 DIAGNOSIS — M75.41 SUBACROMIAL IMPINGEMENT OF RIGHT SHOULDER: ICD-10-CM

## 2020-02-24 DIAGNOSIS — S49.91XA INJURY OF RIGHT SHOULDER, INITIAL ENCOUNTER: ICD-10-CM

## 2020-02-24 LAB — GLUCOSE BLDC GLUCOMTR-MCNC: 120 MG/DL (ref 70–99)

## 2020-02-24 PROCEDURE — 29826 SHO ARTHRS SRG DECOMPRESSION: CPT | Mod: RT | Performed by: ORTHOPAEDIC SURGERY

## 2020-02-24 PROCEDURE — 27110028 ZZH OR GENERAL SUPPLY NON-STERILE: Performed by: ORTHOPAEDIC SURGERY

## 2020-02-24 PROCEDURE — 25000128 H RX IP 250 OP 636: Performed by: NURSE ANESTHETIST, CERTIFIED REGISTERED

## 2020-02-24 PROCEDURE — 25800030 ZZH RX IP 258 OP 636: Performed by: NURSE ANESTHETIST, CERTIFIED REGISTERED

## 2020-02-24 PROCEDURE — 25000125 ZZHC RX 250: Performed by: NURSE ANESTHETIST, CERTIFIED REGISTERED

## 2020-02-24 PROCEDURE — C1713 ANCHOR/SCREW BN/BN,TIS/BN: HCPCS | Performed by: ORTHOPAEDIC SURGERY

## 2020-02-24 PROCEDURE — 25000564 ZZH DESFLURANE, EA 15 MIN: Performed by: ORTHOPAEDIC SURGERY

## 2020-02-24 PROCEDURE — 40000306 ZZH STATISTIC PRE PROC ASSESS II: Performed by: ORTHOPAEDIC SURGERY

## 2020-02-24 PROCEDURE — 36000058 ZZH SURGERY LEVEL 3 EA 15 ADDTL MIN: Performed by: ORTHOPAEDIC SURGERY

## 2020-02-24 PROCEDURE — 71000027 ZZH RECOVERY PHASE 2 EACH 15 MINS: Performed by: ORTHOPAEDIC SURGERY

## 2020-02-24 PROCEDURE — 25000125 ZZHC RX 250: Performed by: ORTHOPAEDIC SURGERY

## 2020-02-24 PROCEDURE — 29826 SHO ARTHRS SRG DECOMPRESSION: CPT | Mod: AS | Performed by: PHYSICIAN ASSISTANT

## 2020-02-24 PROCEDURE — 37000009 ZZH ANESTHESIA TECHNICAL FEE, EACH ADDTL 15 MIN: Performed by: ORTHOPAEDIC SURGERY

## 2020-02-24 PROCEDURE — 25000128 H RX IP 250 OP 636: Performed by: ORTHOPAEDIC SURGERY

## 2020-02-24 PROCEDURE — 29827 SHO ARTHRS SRG RT8TR CUF RPR: CPT | Mod: RT | Performed by: ORTHOPAEDIC SURGERY

## 2020-02-24 PROCEDURE — 29824 SHO ARTHRS SRG DSTL CLAVICLC: CPT | Mod: AS | Performed by: PHYSICIAN ASSISTANT

## 2020-02-24 PROCEDURE — 27210794 ZZH OR GENERAL SUPPLY STERILE: Performed by: ORTHOPAEDIC SURGERY

## 2020-02-24 PROCEDURE — 71000014 ZZH RECOVERY PHASE 1 LEVEL 2 FIRST HR: Performed by: ORTHOPAEDIC SURGERY

## 2020-02-24 PROCEDURE — 82962 GLUCOSE BLOOD TEST: CPT

## 2020-02-24 PROCEDURE — 37000008 ZZH ANESTHESIA TECHNICAL FEE, 1ST 30 MIN: Performed by: ORTHOPAEDIC SURGERY

## 2020-02-24 PROCEDURE — 29824 SHO ARTHRS SRG DSTL CLAVICLC: CPT | Mod: 51 | Performed by: ORTHOPAEDIC SURGERY

## 2020-02-24 PROCEDURE — 36000056 ZZH SURGERY LEVEL 3 1ST 30 MIN: Performed by: ORTHOPAEDIC SURGERY

## 2020-02-24 PROCEDURE — 29827 SHO ARTHRS SRG RT8TR CUF RPR: CPT | Mod: AS | Performed by: PHYSICIAN ASSISTANT

## 2020-02-24 DEVICE — IMPLANTABLE DEVICE: Type: IMPLANTABLE DEVICE | Site: SHOULDER | Status: FUNCTIONAL

## 2020-02-24 DEVICE — IMP ANCHOR SUTURE HEALICOIL PK 4.5MM 72203378: Type: IMPLANTABLE DEVICE | Site: SHOULDER | Status: FUNCTIONAL

## 2020-02-24 RX ORDER — DEXAMETHASONE SODIUM PHOSPHATE 10 MG/ML
INJECTION, SOLUTION INTRAMUSCULAR; INTRAVENOUS PRN
Status: DISCONTINUED | OUTPATIENT
Start: 2020-02-24 | End: 2020-02-24

## 2020-02-24 RX ORDER — ONDANSETRON 2 MG/ML
4 INJECTION INTRAMUSCULAR; INTRAVENOUS EVERY 30 MIN PRN
Status: DISCONTINUED | OUTPATIENT
Start: 2020-02-24 | End: 2020-02-24 | Stop reason: HOSPADM

## 2020-02-24 RX ORDER — ACETAMINOPHEN 325 MG/1
975 TABLET ORAL EVERY 8 HOURS PRN
Qty: 50 TABLET | Refills: 1 | Status: SHIPPED | OUTPATIENT
Start: 2020-02-24

## 2020-02-24 RX ORDER — BUPIVACAINE HYDROCHLORIDE AND EPINEPHRINE 5; 5 MG/ML; UG/ML
INJECTION, SOLUTION PERINEURAL PRN
Status: DISCONTINUED | OUTPATIENT
Start: 2020-02-24 | End: 2020-02-24

## 2020-02-24 RX ORDER — PROPOFOL 10 MG/ML
INJECTION, EMULSION INTRAVENOUS CONTINUOUS PRN
Status: DISCONTINUED | OUTPATIENT
Start: 2020-02-24 | End: 2020-02-24

## 2020-02-24 RX ORDER — ONDANSETRON 4 MG/1
4 TABLET, ORALLY DISINTEGRATING ORAL EVERY 30 MIN PRN
Status: DISCONTINUED | OUTPATIENT
Start: 2020-02-24 | End: 2020-02-24 | Stop reason: HOSPADM

## 2020-02-24 RX ORDER — DIMENHYDRINATE 50 MG/ML
25 INJECTION, SOLUTION INTRAMUSCULAR; INTRAVENOUS
Status: DISCONTINUED | OUTPATIENT
Start: 2020-02-24 | End: 2020-02-24 | Stop reason: HOSPADM

## 2020-02-24 RX ORDER — NALOXONE HYDROCHLORIDE 0.4 MG/ML
.1-.4 INJECTION, SOLUTION INTRAMUSCULAR; INTRAVENOUS; SUBCUTANEOUS
Status: DISCONTINUED | OUTPATIENT
Start: 2020-02-24 | End: 2020-02-24 | Stop reason: HOSPADM

## 2020-02-24 RX ORDER — SODIUM CHLORIDE, SODIUM LACTATE, POTASSIUM CHLORIDE, CALCIUM CHLORIDE 600; 310; 30; 20 MG/100ML; MG/100ML; MG/100ML; MG/100ML
INJECTION, SOLUTION INTRAVENOUS CONTINUOUS
Status: DISCONTINUED | OUTPATIENT
Start: 2020-02-24 | End: 2020-02-24 | Stop reason: HOSPADM

## 2020-02-24 RX ORDER — FENTANYL CITRATE 50 UG/ML
25-50 INJECTION, SOLUTION INTRAMUSCULAR; INTRAVENOUS
Status: DISCONTINUED | OUTPATIENT
Start: 2020-02-24 | End: 2020-02-24 | Stop reason: HOSPADM

## 2020-02-24 RX ORDER — PROPOFOL 10 MG/ML
INJECTION, EMULSION INTRAVENOUS PRN
Status: DISCONTINUED | OUTPATIENT
Start: 2020-02-24 | End: 2020-02-24

## 2020-02-24 RX ORDER — DIMENHYDRINATE 50 MG/ML
INJECTION, SOLUTION INTRAMUSCULAR; INTRAVENOUS PRN
Status: DISCONTINUED | OUTPATIENT
Start: 2020-02-24 | End: 2020-02-24

## 2020-02-24 RX ORDER — MEPERIDINE HYDROCHLORIDE 25 MG/ML
25-50 INJECTION INTRAMUSCULAR; INTRAVENOUS; SUBCUTANEOUS
Status: DISCONTINUED | OUTPATIENT
Start: 2020-02-24 | End: 2020-02-24 | Stop reason: HOSPADM

## 2020-02-24 RX ORDER — LIDOCAINE HYDROCHLORIDE 20 MG/ML
INJECTION, SOLUTION INFILTRATION; PERINEURAL PRN
Status: DISCONTINUED | OUTPATIENT
Start: 2020-02-24 | End: 2020-02-24

## 2020-02-24 RX ORDER — LIDOCAINE 40 MG/G
CREAM TOPICAL
Status: DISCONTINUED | OUTPATIENT
Start: 2020-02-24 | End: 2020-02-24 | Stop reason: HOSPADM

## 2020-02-24 RX ORDER — FENTANYL CITRATE 50 UG/ML
INJECTION, SOLUTION INTRAMUSCULAR; INTRAVENOUS PRN
Status: DISCONTINUED | OUTPATIENT
Start: 2020-02-24 | End: 2020-02-24

## 2020-02-24 RX ORDER — CEFAZOLIN SODIUM 1 G/3ML
1 INJECTION, POWDER, FOR SOLUTION INTRAMUSCULAR; INTRAVENOUS SEE ADMIN INSTRUCTIONS
Status: DISCONTINUED | OUTPATIENT
Start: 2020-02-24 | End: 2020-02-24 | Stop reason: HOSPADM

## 2020-02-24 RX ORDER — HYDROXYZINE HYDROCHLORIDE 25 MG/1
25 TABLET, FILM COATED ORAL
Status: DISCONTINUED | OUTPATIENT
Start: 2020-02-24 | End: 2020-02-24 | Stop reason: HOSPADM

## 2020-02-24 RX ORDER — DICLOFENAC SODIUM 75 MG/1
75 TABLET, DELAYED RELEASE ORAL 2 TIMES DAILY PRN
Qty: 30 TABLET | Refills: 1 | Status: SHIPPED | OUTPATIENT
Start: 2020-02-24 | End: 2021-10-08

## 2020-02-24 RX ORDER — OXYCODONE HYDROCHLORIDE 5 MG/1
5 TABLET ORAL
Status: DISCONTINUED | OUTPATIENT
Start: 2020-02-24 | End: 2020-02-24 | Stop reason: HOSPADM

## 2020-02-24 RX ORDER — HYDROMORPHONE HYDROCHLORIDE 1 MG/ML
.3-.5 INJECTION, SOLUTION INTRAMUSCULAR; INTRAVENOUS; SUBCUTANEOUS EVERY 10 MIN PRN
Status: DISCONTINUED | OUTPATIENT
Start: 2020-02-24 | End: 2020-02-24 | Stop reason: HOSPADM

## 2020-02-24 RX ORDER — TIZANIDINE 2 MG/1
2-4 TABLET ORAL 3 TIMES DAILY PRN
Qty: 40 TABLET | Refills: 1 | Status: SHIPPED | OUTPATIENT
Start: 2020-02-24 | End: 2020-05-27

## 2020-02-24 RX ORDER — ONDANSETRON 2 MG/ML
INJECTION INTRAMUSCULAR; INTRAVENOUS PRN
Status: DISCONTINUED | OUTPATIENT
Start: 2020-02-24 | End: 2020-02-24

## 2020-02-24 RX ORDER — HYDRALAZINE HYDROCHLORIDE 20 MG/ML
2.5-5 INJECTION INTRAMUSCULAR; INTRAVENOUS EVERY 10 MIN PRN
Status: DISCONTINUED | OUTPATIENT
Start: 2020-02-24 | End: 2020-02-24 | Stop reason: HOSPADM

## 2020-02-24 RX ORDER — OXYCODONE HYDROCHLORIDE 5 MG/1
5-10 TABLET ORAL EVERY 4 HOURS PRN
Qty: 30 TABLET | Refills: 0 | Status: SHIPPED | OUTPATIENT
Start: 2020-02-24 | End: 2020-03-09

## 2020-02-24 RX ORDER — CEFAZOLIN SODIUM 2 G/100ML
2 INJECTION, SOLUTION INTRAVENOUS
Status: COMPLETED | OUTPATIENT
Start: 2020-02-24 | End: 2020-02-24

## 2020-02-24 RX ORDER — ALBUTEROL SULFATE 0.83 MG/ML
2.5 SOLUTION RESPIRATORY (INHALATION) EVERY 4 HOURS PRN
Status: DISCONTINUED | OUTPATIENT
Start: 2020-02-24 | End: 2020-02-24 | Stop reason: HOSPADM

## 2020-02-24 RX ORDER — MEPERIDINE HYDROCHLORIDE 25 MG/ML
12.5 INJECTION INTRAMUSCULAR; INTRAVENOUS; SUBCUTANEOUS
Status: DISCONTINUED | OUTPATIENT
Start: 2020-02-24 | End: 2020-02-24 | Stop reason: HOSPADM

## 2020-02-24 RX ORDER — HYDROXYZINE HYDROCHLORIDE 50 MG/ML
50 INJECTION, SOLUTION INTRAMUSCULAR EVERY 6 HOURS PRN
Status: DISCONTINUED | OUTPATIENT
Start: 2020-02-24 | End: 2020-02-24 | Stop reason: HOSPADM

## 2020-02-24 RX ORDER — METOPROLOL TARTRATE 1 MG/ML
1-2 INJECTION, SOLUTION INTRAVENOUS EVERY 5 MIN PRN
Status: DISCONTINUED | OUTPATIENT
Start: 2020-02-24 | End: 2020-02-24 | Stop reason: HOSPADM

## 2020-02-24 RX ADMIN — MIDAZOLAM 2 MG: 1 INJECTION INTRAMUSCULAR; INTRAVENOUS at 12:08

## 2020-02-24 RX ADMIN — SODIUM CHLORIDE, POTASSIUM CHLORIDE, SODIUM LACTATE AND CALCIUM CHLORIDE: 600; 310; 30; 20 INJECTION, SOLUTION INTRAVENOUS at 13:18

## 2020-02-24 RX ADMIN — FENTANYL CITRATE 50 MCG: 50 INJECTION, SOLUTION INTRAMUSCULAR; INTRAVENOUS at 12:21

## 2020-02-24 RX ADMIN — HYDROMORPHONE HYDROCHLORIDE 0.5 MG: 1 INJECTION, SOLUTION INTRAMUSCULAR; INTRAVENOUS; SUBCUTANEOUS at 13:35

## 2020-02-24 RX ADMIN — FENTANYL CITRATE 50 MCG: 50 INJECTION, SOLUTION INTRAMUSCULAR; INTRAVENOUS at 12:08

## 2020-02-24 RX ADMIN — LIDOCAINE HYDROCHLORIDE 60 MG: 20 INJECTION, SOLUTION INFILTRATION; PERINEURAL at 12:17

## 2020-02-24 RX ADMIN — PROPOFOL 200 MG: 10 INJECTION, EMULSION INTRAVENOUS at 12:17

## 2020-02-24 RX ADMIN — FENTANYL CITRATE 50 MCG: 50 INJECTION, SOLUTION INTRAMUSCULAR; INTRAVENOUS at 13:17

## 2020-02-24 RX ADMIN — FENTANYL CITRATE 50 MCG: 50 INJECTION, SOLUTION INTRAMUSCULAR; INTRAVENOUS at 13:07

## 2020-02-24 RX ADMIN — PROPOFOL 30 MG: 10 INJECTION, EMULSION INTRAVENOUS at 14:51

## 2020-02-24 RX ADMIN — PROPOFOL 30 MG: 10 INJECTION, EMULSION INTRAVENOUS at 14:48

## 2020-02-24 RX ADMIN — DEXAMETHASONE SODIUM PHOSPHATE 10 MG: 10 INJECTION, SOLUTION INTRAMUSCULAR; INTRAVENOUS at 14:53

## 2020-02-24 RX ADMIN — ONDANSETRON 4 MG: 2 INJECTION INTRAMUSCULAR; INTRAVENOUS at 13:23

## 2020-02-24 RX ADMIN — SODIUM CHLORIDE, POTASSIUM CHLORIDE, SODIUM LACTATE AND CALCIUM CHLORIDE: 600; 310; 30; 20 INJECTION, SOLUTION INTRAVENOUS at 11:26

## 2020-02-24 RX ADMIN — DIMENHYDRINATE 25 MG: 50 INJECTION, SOLUTION INTRAMUSCULAR; INTRAVENOUS at 12:28

## 2020-02-24 RX ADMIN — LIDOCAINE HYDROCHLORIDE 0.1 ML: 10 INJECTION, SOLUTION EPIDURAL; INFILTRATION; INTRACAUDAL; PERINEURAL at 11:27

## 2020-02-24 RX ADMIN — CEFAZOLIN SODIUM 2 G: 2 INJECTION, SOLUTION INTRAVENOUS at 12:20

## 2020-02-24 RX ADMIN — Medication 20 ML: at 14:53

## 2020-02-24 RX ADMIN — PROPOFOL 30 MG: 10 INJECTION, EMULSION INTRAVENOUS at 14:45

## 2020-02-24 RX ADMIN — ROCURONIUM BROMIDE 40 MG: 10 INJECTION INTRAVENOUS at 12:17

## 2020-02-24 RX ADMIN — PROPOFOL 100 MCG/KG/MIN: 10 INJECTION, EMULSION INTRAVENOUS at 12:14

## 2020-02-24 ASSESSMENT — LIFESTYLE VARIABLES: TOBACCO_USE: 1

## 2020-02-24 NOTE — ANESTHESIA PREPROCEDURE EVALUATION
Anesthesia Pre-Procedure Evaluation    Patient: Amanuel Mccord   MRN: [id number] : 1960          Preoperative Diagnosis: Complete tear of right rotator cuff, unspecified whether traumatic [M75.121]  Osteoarthritis of AC (acromioclavicular) joint [M19.019]  Subacromial impingement of right shoulder [M75.41]    Procedure(s):  right shoulder arthroscopy with rotator cuff repair  subacromial decompression with partial acromioplasty  distal clavicle excision possible open procedure  possible open procedure    No past medical history on file.  History reviewed. No pertinent surgical history.    Anesthesia Evaluation     . Pt has had prior anesthetic. Type: General and MAC    No history of anesthetic complications          ROS/MED HX    ENT/Pulmonary:     (+)tobacco use, Current use , . .    Neurologic:  - neg neurologic ROS     Cardiovascular:     (+) Dyslipidemia, hypertension----. : . . . :. . Previous cardiac testing date:results:date: results:ECG reviewed date:2/15/20 results: appears normal, NSR, normal axis, normal intervals, no acute ST/T changes c/w ischemia, no LVH by voltage criteria, unchanged from previous tracings date: results:          METS/Exercise Tolerance:  >4 METS   Hematologic:  - neg hematologic  ROS       Musculoskeletal:   (+) arthritis,  -       GI/Hepatic:  - neg GI/hepatic ROS       Renal/Genitourinary:  - ROS Renal section negative       Endo:     (+) type II DM Last HgA1c: 6.7 date: 19 .      Psychiatric:  - neg psychiatric ROS       Infectious Disease:  - neg infectious disease ROS       Malignancy:      - no malignancy   Other:    - neg other ROS                      Physical Exam  Normal systems: cardiovascular, pulmonary and dental    Airway   Mallampati: II  TM distance: >3 FB  Neck ROM: full    Dental     Cardiovascular   Rhythm and rate: regular and normal      Pulmonary    breath sounds clear to auscultation            Lab Results   Component Value Date      "12/20/2019    POTASSIUM 4.6 12/20/2019    CHLORIDE 105 12/20/2019    CO2 30 12/20/2019    BUN 18 12/20/2019    CR 0.74 12/20/2019     (H) 12/20/2019    JUAN R 9.5 12/20/2019    ALT 41 12/20/2019    AST 19 04/04/2017    TSH 1.43 12/20/2019       Preop Vitals  BP Readings from Last 3 Encounters:   02/14/20 120/70   01/29/20 136/80   01/16/20 120/80    Pulse Readings from Last 3 Encounters:   02/14/20 100   01/29/20 80   01/09/20 105      Resp Readings from Last 3 Encounters:   02/14/20 16   12/13/19 16   12/26/18 14    SpO2 Readings from Last 3 Encounters:   02/14/20 98%   01/09/20 96%   12/13/19 99%      Temp Readings from Last 1 Encounters:   02/14/20 98.3  F (36.8  C) (Temporal)    Ht Readings from Last 1 Encounters:   02/14/20 1.803 m (5' 11\")      Wt Readings from Last 1 Encounters:   02/14/20 92.7 kg (204 lb 6.4 oz)    Estimated body mass index is 28.51 kg/m  as calculated from the following:    Height as of 2/14/20: 1.803 m (5' 11\").    Weight as of 2/14/20: 92.7 kg (204 lb 6.4 oz).       Anesthesia Plan      History & Physical Review  History and physical reviewed and following examination; no interval change.    ASA Status:  3 .    NPO Status:  > 8 hours    Plan for General, ETT, Peripheral Nerve Block and For Post-op pain in coordination with surgeon with Intravenous induction. Maintenance will be Inhalation and Balanced.    PONV prophylaxis:  Ondansetron (or other 5HT-3) and Dexamethasone or Solumedrol       Postoperative Care  Postoperative pain management:  IV analgesics, Oral pain medications, Peripheral nerve block (Single Shot) and Multi-modal analgesia.      Consents  Anesthetic plan, risks, benefits and alternatives discussed with:  Patient.  Use of blood products discussed: No .   .                 GAYATHRI Stallings CRNA  "

## 2020-02-24 NOTE — ANESTHESIA POSTPROCEDURE EVALUATION
Patient: Amanuel Mccord    Procedure(s):  right shoulder arthroscopy with rotator cuff repair and labral debridement  subacromial decompression with partial acromioplasty  distal clavicle excision    Diagnosis:Complete tear of right rotator cuff, unspecified whether traumatic [M75.121]  Osteoarthritis of AC (acromioclavicular) joint [M19.019]  Subacromial impingement of right shoulder [M75.41]  Diagnosis Additional Information: No value filed.    Anesthesia Type:  General, ETT, Peripheral Nerve Block, For Post-op pain in coordination with surgeon    Note:  Anesthesia Post Evaluation    Patient location during evaluation: Phase 2  Patient participation: Able to participate in evaluation but full recovery from regional anesthesia has not yet ocurrred but is anticipated to occur within 48 hours  Level of consciousness: awake and awake and alert  Pain management: adequate  Airway patency: patent  Cardiovascular status: acceptable, stable and blood pressure returned to baseline  Respiratory status: acceptable, room air and nonlabored ventilation  Hydration status: acceptable  PONV: none     Anesthetic complications: None    Comments: Patient was happy with the anesthesia care received and no anesthesia related complications were noted.  Pain is 0/10.  I will follow up with the patient again if it is needed.        Last vitals:  Vitals:    02/24/20 1545 02/24/20 1600 02/24/20 1615   BP: (!) 171/99 (!) 166/94 (!) 161/94   Pulse: 85 85 85   Resp:      Temp:      SpO2: 94% 94% 95%         Electronically Signed By: GAYATHRI Viramontes CRNA  February 24, 2020  5:40 PM

## 2020-02-24 NOTE — OP NOTE
Procedure Date: 02/24/2020      PREOPERATIVE DIAGNOSIS:  Right shoulder rotator cuff tendon tear (subscapularis, supraspinatus, infraspinatus), acromioclavicular joint arthritis, long head biceps rupture, and subacromial impingement.      POSTOPERATIVE DIAGNOSIS:  Right shoulder massive rotator cuff tear (subscapularis, supraspinatus, infraspinatus), long head biceps rupture, subacromial impingement, and acromioclavicular joint arthritis.      PROCEDURE:  Right shoulder arthroscopy with arthroscopic rotator cuff repair (double row supraspinatus and infraspinatus repair, single row subscapularis repair), and labral biceps stump debridement, subacromial decompression with partial acromioplasty, and distal clavicle excision.      SURGEON:  Hong Edmond DO.      FIRST ASSISTANT:  Kandis Jeff PA-C (utilized throughout the procedure, assisting with instrument passage into and out the shoulder, assisting with rotator cuff repair and she provided closure).      ANESTHESIA:  General with a scalene block.      ESTIMATED BLOOD LOSS:  Less than 10 mL.      FLUIDS:  1500 mL crystalloids.      COUNTS:  Correct.      DISPOSITION:  To PACU in stable condition.      IMPLANTS:  Includes a total of five 4.5 Healicoil suture anchors and four 5.5 Multi-Fix lateral anchors.      GROSS FINDINGS:  The articular cartilage of the glenohumeral joint was intact with no significant chondromalacia.  There was fraying and tearing of the labrum.  There was also a full thickness tear of the long head of the biceps.  Subscapularis was torn with some retraction back to the glenohumeral joint line.  The tendon was of fair quality.  It could be approximated back down to its insertion point fairly easily.  There was a full thickness tear of the supraspinatus and infraspinatus.  There was no significant retraction.  The tendon could be easily grasped and brought back down to its footprint.  There were some degenerative changes in the tendon.   Subacromially, there was some thickened bursal tissue.  Downsloping acromion.  There was some thickened, narrow AC joint.      INDICATIONS:  This is a pleasant 59-year-old male with longstanding history of shoulder pain.  Initially evaluated in my office with concerns of a rotator cuff tendon tear.  We obtained an MRI which confirmed his clinical diagnosis.  We discussed his findings.  He had a supraspinatus, infraspinatus tear with no muscle atrophy.  The subscapularis had a full thickness tear with some muscle atrophy.  There was a deltoid muscle belly tear, AC joint degenerative arthritis, and a proximal long head biceps rupture.  For the deltoid muscle belly tear, I have recommended conservative care with continued observation.  For the rotator cuff tears, I have recommended fixation with the concern that the subscapularis, given the atrophy, may not be able to be amenable to fixation.  He understood this.  Long head biceps tendon showed a complete rupture.  I recommended no further treatment for this.  He understood this as well.  Once the risks of surgery were discussed in length, as well as timeframe for recovery, he opted to proceed.      DETAILS OF PROCEDURE:  He was met preoperatively and informed consent was verified, appropriate extremity was marked, and he was wheeled to operative suite #1.  Transferred to the OR table without any issues.  When deemed appropriate by Anesthesia, he was positioned in beach chair position, head remained in neutral through the entirety of the case.  He was secured to the table.  The right upper extremity was then sterilely prepped and draped in normal manner.  Prior to incision, a timeout was performed.  Again, the appropriate patient, surgery and extremity were verified and antibiotics administered.        A posterior portal was established.  Trocar was gradually and easily reduced intra-articular with no significant resistance.  With an outside-in technique using a spinal  needle, an anterior portal was established.  The above gross finding changes were noted.  The articular surface of the glenohumeral joint was carefully probed showing no significant chondromalacia.  There was some fraying and degenerative tearing of the labrum as well as the full thickness rupture of long head biceps.  At this point, I used a shaver and performed a labral and bicipital stump debridement to a clean margin.  I turned my attention to the subscapularis.  It was 100% torn with some retraction adhesions down to the glenohumeral joint line.  I was able to establish an accessory anterior portal.  I placed a grasper on the subscapularis and freed the tissue up with an Arthrosurface wand.  The tendon was of fair quality, was slightly degenerative in nature.  However, I was able to easily approximate it down to the lesser tuberosity.  With this in mind, I cleaned up the lesser tuberosity to a bleeding bed of bone.  I placed a stay suture into the subscap.  I then placed the anchor in standard manner with good purchase in the bone.  I passed the sutures in a mattress form, tying it down nicely.        I then turned my attention up to the supraspinatus and infraspinatus.  There was a full thickness tear.        I turned my attention subacromial.  There was some thickened bursal tissue.  I used a combination of shaver and an Arthrosurface wand and performed a subacromial decompression.  I followed this with an acromioplasty of the anterolateral acromion.  The tear of the supraspinatus and infraspinatus was again noted.  I utilized a shaver and cleaned up the footprint to a bleeding bed of bone.  I placed a total of four 4.5 Healicoil suture anchors in medial row fixation from anterior to posterior direction.  All of them had good purchase in the bone.  I then passed the sutures in mattress form from the anterior to posterior direction.  I then tied them from posterior to anterior direction.  This approximated the  medial row down quite nicely with minimal tension at the repair site.  I then brought the sutures out in geovanna-cross fashion using the Multi-Fix anchors, and this gave additional lateral fixation quite nicely.        I then turned my attention to the AC joint.  Used a shaver and an Arthrosurface wand as well as a bur and performed a distal clavicle excision.  Shoulder was copiously irrigated, suctioned dry, instruments were removed.  Portal sites were closed with nylon.  A sterile bandage was applied.      He was subsequently transferred to the PACU in stable condition.  He will be discharged home with oxycodone, Tylenol, Zanaflex and Voltaren for pain.  Followup appointment has been scheduled, discharge instructions provided.         HUMERA LYNCH DO             D: 2020   T: 2020   MT: JOSEPH      Name:     ZOEY KEY   MRN:      [id number]        Account:        [id number]   :      1960           Procedure Date: 2020      Document: [id number]

## 2020-02-24 NOTE — ANESTHESIA PROCEDURE NOTES
Peripheral nerve/Neuraxial procedure note : interscalene  Pre-Procedure  Performed by  Donavon Diaz APRN CRNA   Location: post-op    Procedure Times:2/24/2020 4:42 PM and 2/24/2020 2:55 PM  Pre-Anesthestic Checklist: patient identified, IV checked, site marked, risks and benefits discussed, informed consent, monitors and equipment checked, pre-op evaluation, at physician/surgeon's request and post-op pain management    Timeout  Correct Patient: Yes   Correct Procedure: Yes   Correct Site: Yes   Correct Laterality: Yes   Correct Position: Yes   Site Marked: Yes   .   Procedure Documentation    .    Procedure: interscalene, right.     Ultrasound used to identify targeted nerve, plexus, or vascular marker and placed a needle adjacent to it., Ultrasound was used to visualize the spread of the anesthetic in close proximity to the above stated nerve. A permanent image is entered into the patient's record.  Patient Prep/Sterile Barriers; mask, sterile gloves, chlorhexidine gluconate and isopropyl alcohol.  Nerve Stim: Initial Level 0.05 mA.  Lowest motor response 0.23 mA..  Needle: insulated   Needle Gauge: 22.  Needle Length (millimeters) 100  Insertion Method: Single Shot.        Assessment/Narrative  Paresthesias: No.  Injection made incrementally with aspirations every 5 mL..  The placement was negative for: blood aspirated, painful injection and site bleeding.  Bolus given via needle..   Secured via.   Complications: none. Test dose of mL at. Test dose negative for signs of intravascular, subdural or intrathecal injection. Comments:  Pt tolerated the procedure well as given small propofol bumps in PACU.  No complications noted.  Will follow if needed.

## 2020-02-24 NOTE — ANESTHESIA CARE TRANSFER NOTE
Patient: Amanuel Mccord    Procedure(s):  right shoulder arthroscopy with rotator cuff repair and labral debridement  subacromial decompression with partial acromioplasty  distal clavicle excision    Diagnosis: Complete tear of right rotator cuff, unspecified whether traumatic [M75.121]  Osteoarthritis of AC (acromioclavicular) joint [M19.019]  Subacromial impingement of right shoulder [M75.41]  Diagnosis Additional Information: No value filed.    Anesthesia Type:   General, ETT, Peripheral Nerve Block, For Post-op pain in coordination with surgeon     Note:  Airway :Face Mask  Patient transferred to:PACU  Handoff Report: Identifed the Patient, Identified the Reponsible Provider, Reviewed the pertinent medical history, Discussed the surgical course, Reviewed Intra-OP anesthesia mangement and issues during anesthesia, Set expectations for post-procedure period and Allowed opportunity for questions and acknowledgement of understanding      Vitals: (Last set prior to Anesthesia Care Transfer)    CRNA VITALS  2/24/2020 1407 - 2/24/2020 1507      2/24/2020             Pulse:  80    SpO2:  97 %                Electronically Signed By: GAYATHRI Viramontes CRNA  February 24, 2020  5:35 PM

## 2020-02-24 NOTE — BRIEF OP NOTE
[facility] Brief Operative Note    Pre-operative diagnosis: Complete tear of right rotator cuff,   Osteoarthritis of AC (acromioclavicular) joint   Subacromial impingement of right shoulder, long head biceps rupture   Post-operative diagnosis: right shoulder massive rotator cuff tear (subscapularis, supraspinatus, infraspinatus), Long head biceps rupture, subacromial impingment, AC joint arthritis    Procedure: Procedure(s):  right shoulder arthroscopy with rotator cuff repair (double row supraspinatus, infraspinatus) (single row subscap) and labral/biceps stump debridement  subacromial decompression with partial acromioplasty  distal clavicle excision   Surgeon:  Anesthesia: Hong Edmond DO  General   Assistant(s): Kandis Jeff PA-C (A Physician Assisstant was necessary for her expertise, exposure and surgical assistance throughout the case.)   Estimated blood loss:  Tourniquet time/pressure:  Urine output:  Fluids: Less than 10 ml  0 minutes at 0 mmHg  NA  1500 ml Crystalloids   Specimens: None   Findings: right shoulder massive rotator cuff tear (subscapularis, supraspinatus, infraspinatus), Long head biceps rupture, subacromial impingment, AC joint arthritis   [id number]     Joel Edmond D.O.      Implants:   Implant Name Type Inv. Item Serial No.  Lot No. LRB No. Used   IMP ANCHOR SUTURE HEALICOIL PK 4.5MM [id number] [employer]/[place] IMP ANCHOR SUTURE HEALICOIL PK 4.5MM [id number]  [place]  [id number] Right 2   IMP ANCHOR SUTURE HEALICOIL PK 4.5MM [id number] [employer]/[place] IMP ANCHOR SUTURE HEALICOIL PK 4.5MM [id number]  [place]  [id number] Right 1   IMP ANCHOR SUTURE HEALICOIL PK 4.5MM [id number] Metallic Hardware/[place] IMP ANCHOR SUTURE HEALICOIL PK 4.5MM [id number]  [place]  [id number] Right 1   IMP ANCHOR SUTURE HEALICOIL PK 4.5MM [id number] Metallic Hardware/[place] IMP ANCHOR SUTURE HEALICOIL PK 4.5MM [id number]  [place]  [id number] Right 1   5.5mm Multifix S Ultra    [employer]  [id number] Right 1   5.5mm Multifix S Ultra    [employer] [id number] Right 3

## 2020-02-24 NOTE — DISCHARGE INSTRUCTIONS
General Shoulder Arthroscopy Discharge Instructions                                      [phone]  Bone and Joint Service Line for issues or concerns    General Care:  After surgery you may feel tired/sleepy. This is normal. Please have someone stay with you for 24 hours after surgery. You should avoid driving for 1-2 days after surgery, as your reaction time may be slow. You should not drive at all if you have had surgery on your arms, right leg and/or are taking narcotic pain medications until released by your doctor. If you have any question along the way please contact the office. If you feel it is an issue cannot wait for normal office hours, contact the on-call physician.    Bandages:    Change your bandage after the first 72 hours. You may use Band-Aids or sterile gauze with a small amount of tape. It s normal to have some blood-tinged fluid on your bandages, this will usually continue for the first day or two. Keep the area clean and dry. Do not apply any ointments.     Bathing:  Do not submerge your incision in water such as a bath or pool. It is ok to shower after removing your initial bandage after the first 2 days from surgery. Avoid any excessively hot showers, baths, or hot tubes after surgery.     Follow up:  Your follow up appointment should already be scheduled. If its not, please call the office to verify an appointment 10 days after surgery.     Diet:  Start with non-alcoholic liquids at first, particularly water or sports drinks after surgery. Progress to bland foods such as crackers and bread and finally to your normal diet if you have no problems.     Pain control:  Take your pain medications as prescribed. These medications may make you sleepy. Do not drive, operate equipment, or drink alcohol when taking these.   Please plan accordingly, pain medications will not be re-filled on the weekends or at night. Call the office during the day if you need more medications. Narcotic pain medication  can cause constipation, take the stool softer as prescribed.         Sleeping Position:  Sometimes this can be a challenge after shoulder surgery. Some find it comfortable sleeping propped up on several pillows with pillows also behind their elbow. Others, if available, sleep in an easy chair. You may also lay on your back, it may be more comfortable to have a pillow behind your elbow to keep it from falling back.     Sling/Brace:  Keep the sling or brace on after surgery until your follow up. You may remove to bathe, but keep your elbow to your side and do not reach with your arm. You should also slip your arm out of your sling and allow your elbow to straighten all the way out and then bend all the way up. You may need to use your other arm to assist in this. Also make sure to move your wrist back and forth and practice making a fist. Do this 10-15 times about 3-4 times a day.       Physical Therapy:  Depending on your surgery, physical therapy may start within a few days or be delayed 4-6 weeks. At your first post-operative visit, your doctor will direct you on your personal therapy program. You may start the Codman exercises as listed below if pain is controlled.     Codman Shoulder Exercises:  Bend over at the waist and let your arm completely relax. Support your weight by leaning on a table or counter.  Swing your arm slowly from side to side.  Repeat this 20 times, four times each day:    Normal findings after surgery:  It is sometimes normal to have pain in the back of the shoulder even before the block wears off in the hand or elbow.  Warmth and swelling about the hand and shoulder is normal for up to 3-4 weeks after surgery.  Numbness around the incisions is normal.  You may have bruising around the incisions and into the bicep area. You may notice this bruising settle into the elbow and forearm.   Low grade fevers less than 100.5 degrees Fahrenheit are normal.     When to call the Office:  Temperature  greater than 101.5 degrees Fahreheit.  Fever, chills, and increasing pain in the shoulder.  Excessive drainage from the incisions that include bright red blood.  Drainage from the incisions sites that appear yellow, pus-like, or foul smelling.  Increasing pain the shoulder not relieved by the prescribed pain medications or ice.  Persistent nausea or vomiting   Any other effects you feel are significant.    Interscalene Nerve Block   Regional anesthesia is injected into or around the nerves to anesthetize the area supplied by that set of nerves.  It is a type of local anesthesia used to numb a specific area, and is used to control pain and decrease the need for narcotic following surgery.  Interscalene Block: A block injected near the neck/upper shoulder for post-operative pain relief after upper arm or shoulder surgery.  Benefits: Significant to total pain relief following extensive surgeries involving the shoulder and upper arm.  Additional benefits include: Decreased pain medication requirements, reduced incidence of nausea and vomiting, and potentially early discharge home.  Normal Course: A numb and often immobile shoulder and upper arm is expected for approximately 8-12 hours.  The duration of the numbness can vary and is dependent on the type of local anesthetic used, additive and individual variation.    Once the numbness starts to wear off: the discomfort from surgery will intensify progressively over the next 1-2 hours.  We recommend starting oral narcotics (e.g. Vicodin or Percocet) and anti-inflammatory medications (e.g. ibuprofen or Motrin) as soon as oral medications are tolerated.  These medications should be taken on a scheduled basis, allowing for a smooth transition from the nerve block to oral medication based relief.  Normal and expected side effects: a droopy eyelid and blurry vision on the affected side, along with voice hoarseness can last as long as the local anesthetic effect.  Local  anesthetic effect varies, but is typically between 8 and 24 hours.  Mild sensation of shortness of breath may be noted particularly in patients with respiratory disease  Risks: failed block, bleeding, infection, reaction to local anesthetic including seizure and cardiac arrest, spinal block, epidural param, collapsed lung, peripheral nerve injury or persistent tingling sensation are all potential risks.  Please discuss any concerns regarding these risks with your anesthesia care provider.  These are most likely to occur at the time of administration of the block.   Additional Recommendations: Please keep the operative arm and elbow well protected and padded for the duration of numbness.  This will prevent unrecognized pressure from being placed on the arm that could result in nerve injury.  Post-operative call: To ensure your safety, you will receive a nursing call 24 hours after surgery.  If you have additional concerns or if you feel that the medications are not wearing off, please inform the nurse or your anesthesia care provider.  Please discuss all concerns regarding this procedure and your anesthetic care with your anesthesia care provider.  The above information is not intended as a substitute for a complete discussion with your anesthesia care provider.  It is intended for your education and to enhance your ability to ask informed questions.    [facility]   Adult Discharge Orders & Instructions     For 24 hours after surgery    1. Get plenty of rest.  A responsible adult must stay with you for at least 24 hours after you leave the hospital.   2. Do not drive or use heavy equipment.  If you have weakness or tingling, don't drive or use heavy equipment until this feeling goes away.  3. Do not drink alcohol.  4. Avoid strenuous or risky activities.  Ask for help when climbing stairs.   5. You may feel lightheaded.  If so, sit for a few minutes before standing.  Have someone help you get  up.   6. You may have a slight fever. Call the doctor if your fever is over 100 F (37.7 C) (taken under the tongue) or lasts longer than 24 hours.  7. You may have a dry mouth, a sore throat, muscle aches or trouble sleeping.  These should go away after 24 hours.  8. Do not make important or legal decisions.  We don t expect you to have any problems from the surgery or treatment you had today. Just in case, here s what to do if you have pain, upset stomach (nausea), bleeding or infection:  Pain:  Take medicines your doctor has prescribed or over-the-counter medicine they have suggested. Resting and using ice packs can help, too. For surgery on an arm or leg, raise it on a pillow to ease swelling. Call your doctor if these methods don t work.  Copyright [employer], Licensed under [employer]  Upset stomach (nausea):  Take anti-nausea medicine approved by your doctor. Drink clear liquids like apple juice, ginger ale, broth or 7-Up. Be sure to drink enough fluids. Rest can help, too. Move to normal foods when you re ready.   Bleeding:  In the first 24 hours, you may see a little blood on your dressing, about the size of a quarter. You don t need to worry about this much blood, but if the blood spot keeps getting bigger:    Put pressure on the wound if you can, AND    Call your doctor.  Copyright [employer], Licensed under [employer]  Fever/Infection: Please call your doctor if you have any of these signs:    Redness    Swelling    Wound feels warm    Pain gets worse    Bad-smelling fluid leaks from wound    Fever or chills  Call your doctor for any of the followin.  It has been over 8 to 10 hours since surgery and you are still not able to urinate (pass water).    Nurse advice line: [phone] (24 hour)

## 2020-02-25 NOTE — OR NURSING
"[facility] Same Day Surgery  Discharge Call Back  Amanuel Mccord  1960  MRN: [id number]  Home: [phone] (home)   PCP: Schoen, Gregory G    We are calling to see how you're doing since your surgery/procedure with us?   Comments: \"I am doing well.   The pain is managed by the block, which is still in effect by the way.\"  Clinical Questions  1. Have you had time to look at your discharge instructions? Do you have any questions in regards to the instructions?   Comment: \"My wife read the instructions last night, I need to read them today.\"  2. Do you feel your pain is being controlled with the regimen the surgeon sent you home on? (ie: prescription medications, over the counter pain medications, ice packs)   Comments: Discussion with patient on use of pain meds, especially when block starts to wear off.  Encouraged continued icing to help minimize swelling.  3. Have you noticed any drainage on your dressing? Do you know what to do if you have bleeding as a result of your procedure?   Comments: \"no bleedin\"  4. Have you had any nausea/vomiting? Do you know how to treat this?   Comment: \"no\"  5. Have you had any signs/symptoms of infection? (ie: fever, swelling, heat, drainage or redness) Do you know what to do if you have?   Comment: \"no  6. Do you have a follow up appointment made with your surgeon? Do you have a number to contact them at if you need it?   Comment: Reviewed date and time of follow up appointment.  Retained Foreign Object (MARINO, Hemovac, Penrose, Wound Packing, Vaginal Packing, Nasal Splints, Urethral Stents, Marinelli Catheter)  1. Do you still have  in place? NA  2. If the item is still in place, can you review the plan for removal with me?       You may be randomly selected to fill out a [place] [facility] survey. We would appreciate you taking the time to fill this out. It is important to us if you would answer all of the questions on the survey.              "

## 2020-03-09 ENCOUNTER — OFFICE VISIT (OUTPATIENT)
Dept: ORTHOPEDICS | Facility: CLINIC | Age: 60
End: 2020-03-09
Payer: COMMERCIAL

## 2020-03-09 VITALS
BODY MASS INDEX: 28.56 KG/M2 | HEIGHT: 71 IN | WEIGHT: 204 LBS | SYSTOLIC BLOOD PRESSURE: 120 MMHG | DIASTOLIC BLOOD PRESSURE: 90 MMHG

## 2020-03-09 DIAGNOSIS — Z98.890 S/P COMPLETE REPAIR OF ROTATOR CUFF: Primary | ICD-10-CM

## 2020-03-09 DIAGNOSIS — M75.121 COMPLETE TEAR OF RIGHT ROTATOR CUFF, UNSPECIFIED WHETHER TRAUMATIC: ICD-10-CM

## 2020-03-09 PROCEDURE — 99024 POSTOP FOLLOW-UP VISIT: CPT | Performed by: NURSE PRACTITIONER

## 2020-03-09 RX ORDER — OXYCODONE HYDROCHLORIDE 5 MG/1
5 TABLET ORAL EVERY 6 HOURS PRN
Qty: 20 TABLET | Refills: 0 | Status: SHIPPED | OUTPATIENT
Start: 2020-03-09 | End: 2020-05-27

## 2020-03-09 ASSESSMENT — MIFFLIN-ST. JEOR: SCORE: 1762.47

## 2020-03-09 ASSESSMENT — PAIN SCALES - GENERAL: PAINLEVEL: SEVERE PAIN (6)

## 2020-03-09 NOTE — PROGRESS NOTES
"Orthopedic Clinic Post-Operative Note    CHIEF COMPLAINT:   Chief Complaint   Patient presents with     Surgical Followup     dos: 2/24/2020~Right shoulder arthroscopy with arthroscopic rotator cuff repair (double row supraspinatus and infraspinatus repair, single row subscapularis repair), and labral biceps stump debridement, subacromial decompression with partial acromioplasty, and distal clavicle excision~14 days postop       HISTORY OF PRESENT ILLNESS  Returns to clinic.  Doing well. Pain slowly improving. Still sore. Taking \"occasional\" oxycodone. Has taken 25 tabs over the last 2 weeks.  Otherwise taking tylenol and zanaflex.  Using sling. Doing codman's and elbow ROM. Reports no lifting pushing pulling.  Reports no new injuries.  Off of work, planning at least 12 weeks prior to return to work.     Patient's past medical, surgical, social and family histories reviewed.     Past Medical History:   Diagnosis Date     PONV (postoperative nausea and vomiting)        Past Surgical History:   Procedure Laterality Date     ARTHROSCOPY SHOULDER DECOMPRESSION Right 2/24/2020    Procedure: subacromial decompression with partial acromioplasty;  Surgeon: Hong Edmond DO;  [facility]     ARTHROSCOPY SHOULDER DISTAL CLAVICLE REPAIR Right 2/24/2020    Procedure: distal clavicle excision;  Surgeon: Hong Edmond DO;  [facility]     ARTHROSCOPY SHOULDER ROTATOR CUFF REPAIR Right 2/24/2020    Procedure: right shoulder arthroscopy with rotator cuff repair and labral debridement;  Surgeon: Hong Edmond DO;  [facility]       Medications:  acetaminophen (TYLENOL) 325 MG tablet, Take 3 tablets (975 mg) by mouth every 8 hours as needed for mild pain  amLODIPine-benazepril (LOTREL) 5-20 MG capsule, Take 1 capsule by mouth daily  atorvastatin (LIPITOR) 80 MG tablet, Take 1 tablet (80 mg) by mouth every morning Must keep upcoming appointment for refills.  diclofenac (VOLTAREN) 75 MG " "EC tablet, Take 1 tablet (75 mg) by mouth 2 times daily as needed for moderate pain  glimepiride (AMARYL) 1 MG tablet, Take 1 tablet (1 mg) by mouth daily  JANUMET  MG tablet, TAKE ONE TABLET BY MOUTH TWICE A DAY WITH MEALS  oxyCODONE (ROXICODONE) 5 MG tablet, Take 1-2 tablets (5-10 mg) by mouth every 4 hours as needed for moderate to severe pain  sildenafil (VIAGRA) 100 MG tablet, TAKE ONE TABLET BY MOUTH DAILY AS NEEDED  tiZANidine (ZANAFLEX) 2 MG tablet, Take 1-2 tablets (2-4 mg) by mouth 3 times daily as needed for muscle spasms    No current facility-administered medications on file prior to visit.       No Known Allergies    Social History     Occupational History     Not on file   Tobacco Use     Smoking status: Current Every Day Smoker     Types: Cigarettes     Smokeless tobacco: Never Used   Substance and Sexual Activity     Alcohol use: Yes     Drug use: No     Sexual activity: Yes       Family History   Problem Relation Age of Onset     Hypertension Mother      Hyperlipidemia Mother      Hyperlipidemia Father      Hypertension Father      Diabetes Brother      Diabetes Sister        REVIEW OF SYSTEMS  General: negative for, night sweats, dizziness, fatigue  Resp: No shortness of breath and no cough  CV: negative for chest pain, syncope or near-syncope  GI: negative for nausea, vomiting and diarrhea  : negative for dysuria and hematuria  Musculoskeletal: as above  Neurologic: negative for syncope   Hematologic: negative for bleeding disorder    Physical Exam:  Vitals: BP (!) 120/90   Ht 1.803 m (5' 11\")   Wt 92.5 kg (204 lb)   BMI 28.45 kg/m    BMI= Body mass index is 28.45 kg/m .  Constitutional: healthy, alert and no acute distress   Psychiatric: mentation appears normal and affect normal/bright  NEURO: no focal deficits  SKIN: .healing well, well approximated skin edges, without signs of infection including no erythema, incision breakdown or purlent drainage  JOINT/EXTREMITIES: right " shoulder: bruising along incision sites and into anterior arm. Non-tender.  Elbow motion intact. Hand and finger motion intact. Distal neurovascular grossly intact. Strength and motion not tested on the shoulder.   GAIT: not tested     Diagnostic Modalities:  None today.  Reviewed intra op pictures and provided patient a copy  Independent visualization of the images was performed.      Impression:   Chief Complaint   Patient presents with     Surgical Followup     dos: 2/24/2020~Right shoulder arthroscopy with arthroscopic rotator cuff repair (double row supraspinatus and infraspinatus repair, single row subscapularis repair), and labral biceps stump debridement, subacromial decompression with partial acromioplasty, and distal clavicle excision~14 days postop   Recovering as expected     Plan:   Activity: discussed the nature of the large size of the repair of infra, supra, and subscapularis. No lift push pull with the right shoulder. Reinforced importance of this.  Codman's ok, elbow/hand ROM ok. Otherwise keep shoulder in the sling except for motion and hygiene.  physical therapy to start in 2 weeks with massive repair protocol plus subscapularis protocol.  Sling for another 4 weeks.  No work letter requested.   Staples/Sutures out: Yes. No steri strips needed. Ok to leave open to air. Ok to shower with. No soaking for another 2 weeks.   Pain controlled: Yes. Continue to use: occasional oxycodone (refill provided), tylenol and zanaflex (both have 1 refill left), icing, rest, sling.     Return to clinic 6 weeks, or sooner as needed for changes.    Re-x-ray on return: No    GAYATHRI Damon, CNP  Orthopedic Surgery

## 2020-03-09 NOTE — PROGRESS NOTES
Amanuel to follow up with Primary Care provider regarding elevated blood pressure.  Annabella/CHRISTOPHER

## 2020-03-09 NOTE — LETTER
"    3/9/2020         RE: Amanuel Mccord  [address]  [facility] [address]        Dear Colleague,    Thank you for referring your patient, Amanuel Mccord, to the [facility]. Please see a copy of my visit note below.    Amanuel to follow up with Primary Care provider regarding elevated blood pressure.  Annabella/CHRISTOPHER     Orthopedic Clinic Post-Operative Note    CHIEF COMPLAINT:   Chief Complaint   Patient presents with     Surgical Followup     dos: 2/24/2020~Right shoulder arthroscopy with arthroscopic rotator cuff repair (double row supraspinatus and infraspinatus repair, single row subscapularis repair), and labral biceps stump debridement, subacromial decompression with partial acromioplasty, and distal clavicle excision~14 days postop       HISTORY OF PRESENT ILLNESS  Returns to clinic.  Doing well. Pain slowly improving. Still sore. Taking \"occasional\" oxycodone. Has taken 25 tabs over the last 2 weeks.  Otherwise taking tylenol and zanaflex.  Using sling. Doing codman's and elbow ROM. Reports no lifting pushing pulling.  Reports no new injuries.  Off of work, planning at least 12 weeks prior to return to work.     Patient's past medical, surgical, social and family histories reviewed.     Past Medical History:   Diagnosis Date     PONV (postoperative nausea and vomiting)        Past Surgical History:   Procedure Laterality Date     ARTHROSCOPY SHOULDER DECOMPRESSION Right 2/24/2020    Procedure: subacromial decompression with partial acromioplasty;  Surgeon: Hong Edmond DO;  [facility]     ARTHROSCOPY SHOULDER DISTAL CLAVICLE REPAIR Right 2/24/2020    Procedure: distal clavicle excision;  Surgeon: Hong Edmond DO;  [facility]     ARTHROSCOPY SHOULDER ROTATOR CUFF REPAIR Right 2/24/2020    Procedure: right shoulder arthroscopy with rotator cuff repair and labral debridement;  Surgeon: Hong Edmond DO;  Location[facility] " "      Medications:  acetaminophen (TYLENOL) 325 MG tablet, Take 3 tablets (975 mg) by mouth every 8 hours as needed for mild pain  amLODIPine-benazepril (LOTREL) 5-20 MG capsule, Take 1 capsule by mouth daily  atorvastatin (LIPITOR) 80 MG tablet, Take 1 tablet (80 mg) by mouth every morning Must keep upcoming appointment for refills.  diclofenac (VOLTAREN) 75 MG EC tablet, Take 1 tablet (75 mg) by mouth 2 times daily as needed for moderate pain  glimepiride (AMARYL) 1 MG tablet, Take 1 tablet (1 mg) by mouth daily  JANUMET  MG tablet, TAKE ONE TABLET BY MOUTH TWICE A DAY WITH MEALS  oxyCODONE (ROXICODONE) 5 MG tablet, Take 1-2 tablets (5-10 mg) by mouth every 4 hours as needed for moderate to severe pain  sildenafil (VIAGRA) 100 MG tablet, TAKE ONE TABLET BY MOUTH DAILY AS NEEDED  tiZANidine (ZANAFLEX) 2 MG tablet, Take 1-2 tablets (2-4 mg) by mouth 3 times daily as needed for muscle spasms    No current facility-administered medications on file prior to visit.       No Known Allergies    Social History     Occupational History     Not on file   Tobacco Use     Smoking status: Current Every Day Smoker     Types: Cigarettes     Smokeless tobacco: Never Used   Substance and Sexual Activity     Alcohol use: Yes     Drug use: No     Sexual activity: Yes       Family History   Problem Relation Age of Onset     Hypertension Mother      Hyperlipidemia Mother      Hyperlipidemia Father      Hypertension Father      Diabetes Brother      Diabetes Sister        REVIEW OF SYSTEMS  General: negative for, night sweats, dizziness, fatigue  Resp: No shortness of breath and no cough  CV: negative for chest pain, syncope or near-syncope  GI: negative for nausea, vomiting and diarrhea  : negative for dysuria and hematuria  Musculoskeletal: as above  Neurologic: negative for syncope   Hematologic: negative for bleeding disorder    Physical Exam:  Vitals: BP (!) 120/90   Ht 1.803 m (5' 11\")   Wt 92.5 kg (204 lb)   BMI 28.45 " kg/m    BMI= Body mass index is 28.45 kg/m .  Constitutional: healthy, alert and no acute distress   Psychiatric: mentation appears normal and affect normal/bright  NEURO: no focal deficits  SKIN: .healing well, well approximated skin edges, without signs of infection including no erythema, incision breakdown or purlent drainage  JOINT/EXTREMITIES: right shoulder: bruising along incision sites and into anterior arm. Non-tender.  Elbow motion intact. Hand and finger motion intact. Distal neurovascular grossly intact. Strength and motion not tested on the shoulder.   GAIT: not tested     Diagnostic Modalities:  None today.  Reviewed intra op pictures and provided patient a copy  Independent visualization of the images was performed.      Impression:   Chief Complaint   Patient presents with     Surgical Followup     dos: 2/24/2020~Right shoulder arthroscopy with arthroscopic rotator cuff repair (double row supraspinatus and infraspinatus repair, single row subscapularis repair), and labral biceps stump debridement, subacromial decompression with partial acromioplasty, and distal clavicle excision~14 days postop   Recovering as expected     Plan:   Activity: discussed the nature of the large size of the repair of infra, supra, and subscapularis. No lift push pull with the right shoulder. Reinforced importance of this.  Codman's ok, elbow/hand ROM ok. Otherwise keep shoulder in the sling except for motion and hygiene.  physical therapy to start in 2 weeks with massive repair protocol plus subscapularis protocol.  Sling for another 4 weeks.  No work letter requested.   Staples/Sutures out: Yes. No steri strips needed. Ok to leave open to air. Ok to shower with. No soaking for another 2 weeks.   Pain controlled: Yes. Continue to use: occasional oxycodone (refill provided), tylenol and zanaflex (both have 1 refill left), icing, rest, sling.     Return to clinic 6 weeks, or sooner as needed for changes.    Re-x-ray on  return: No    GAYATHRI Damon, CNP  Orthopedic Surgery        Again, thank you for allowing me to participate in the care of your patient.        Sincerely,        Hong Edmond, DO

## 2020-04-15 ENCOUNTER — OFFICE VISIT (OUTPATIENT)
Dept: ORTHOPEDICS | Facility: CLINIC | Age: 60
End: 2020-04-15
Payer: COMMERCIAL

## 2020-04-15 ENCOUNTER — HOSPITAL ENCOUNTER (OUTPATIENT)
Dept: PHYSICAL THERAPY | Facility: CLINIC | Age: 60
Setting detail: THERAPIES SERIES
End: 2020-04-15
Attending: ORTHOPAEDIC SURGERY
Payer: COMMERCIAL

## 2020-04-15 VITALS
TEMPERATURE: 97.1 F | HEART RATE: 85 BPM | WEIGHT: 202 LBS | DIASTOLIC BLOOD PRESSURE: 102 MMHG | BODY MASS INDEX: 28.28 KG/M2 | SYSTOLIC BLOOD PRESSURE: 150 MMHG | HEIGHT: 71 IN

## 2020-04-15 DIAGNOSIS — M75.121 COMPLETE TEAR OF RIGHT ROTATOR CUFF, UNSPECIFIED WHETHER TRAUMATIC: ICD-10-CM

## 2020-04-15 DIAGNOSIS — M75.121 COMPLETE TEAR OF RIGHT ROTATOR CUFF, UNSPECIFIED WHETHER TRAUMATIC: Primary | ICD-10-CM

## 2020-04-15 PROCEDURE — 97162 PT EVAL MOD COMPLEX 30 MIN: CPT | Mod: GP | Performed by: PHYSICAL THERAPIST

## 2020-04-15 PROCEDURE — 97110 THERAPEUTIC EXERCISES: CPT | Mod: GP | Performed by: PHYSICAL THERAPIST

## 2020-04-15 PROCEDURE — 99024 POSTOP FOLLOW-UP VISIT: CPT | Performed by: ORTHOPAEDIC SURGERY

## 2020-04-15 ASSESSMENT — PAIN SCALES - GENERAL: PAINLEVEL: MODERATE PAIN (4)

## 2020-04-15 ASSESSMENT — MIFFLIN-ST. JEOR: SCORE: 1753.4

## 2020-04-15 NOTE — PROGRESS NOTES
"Orthopedic Clinic Post-Operative Note    CHIEF COMPLAINT:   Chief Complaint   Patient presents with     RECHECK     dos: 2/24/2020~Right shoulder arthroscopy with arthroscopic rotator cuff repair (double row supraspinatus and infraspinatus repair, single row subscapularis repair), and labral biceps stump debridement, subacromial decompression with partial acromioplasty, and distal clavicle excision~       HISTORY OF PRESENT ILLNESS  Overall making progress.  Pain is getting better.  He reports he might be doing too much at times.  He is out doing some lawn \"raking with the other arm but every once in a while the right side goes with it.\"    Patient's past medical, surgical, social and family histories reviewed.     Past Medical History:   Diagnosis Date     PONV (postoperative nausea and vomiting)        Past Surgical History:   Procedure Laterality Date     ARTHROSCOPY SHOULDER DECOMPRESSION Right 2/24/2020    Procedure: subacromial decompression with partial acromioplasty;  Surgeon: Hong Edmond DO;  [facility]     ARTHROSCOPY SHOULDER DISTAL CLAVICLE REPAIR Right 2/24/2020    Procedure: distal clavicle excision;  Surgeon: Hong Edmond DO;  [facility]     ARTHROSCOPY SHOULDER ROTATOR CUFF REPAIR Right 2/24/2020    Procedure: right shoulder arthroscopy with rotator cuff repair and labral debridement;  Surgeon: Hong Edmond DO;  [facility]       Medications:  acetaminophen (TYLENOL) 325 MG tablet, Take 3 tablets (975 mg) by mouth every 8 hours as needed for mild pain  amLODIPine-benazepril (LOTREL) 5-20 MG capsule, Take 1 capsule by mouth daily  atorvastatin (LIPITOR) 80 MG tablet, Take 1 tablet (80 mg) by mouth every morning Must keep upcoming appointment for refills.  diclofenac (VOLTAREN) 75 MG EC tablet, Take 1 tablet (75 mg) by mouth 2 times daily as needed for moderate pain  glimepiride (AMARYL) 1 MG tablet, Take 1 tablet (1 mg) by mouth daily  MARCELOUMET " " MG tablet, TAKE ONE TABLET BY MOUTH TWICE A DAY WITH MEALS  oxyCODONE (ROXICODONE) 5 MG tablet, Take 1 tablet (5 mg) by mouth every 6 hours as needed for moderate to severe pain  sildenafil (VIAGRA) 100 MG tablet, TAKE ONE TABLET BY MOUTH DAILY AS NEEDED  tiZANidine (ZANAFLEX) 2 MG tablet, Take 1-2 tablets (2-4 mg) by mouth 3 times daily as needed for muscle spasms    No current facility-administered medications on file prior to visit.       No Known Allergies    Social History     Occupational History     Not on file   Tobacco Use     Smoking status: Current Every Day Smoker     Types: Cigarettes     Smokeless tobacco: Never Used   Substance and Sexual Activity     Alcohol use: Yes     Drug use: No     Sexual activity: Yes       Family History   Problem Relation Age of Onset     Hypertension Mother      Hyperlipidemia Mother      Hyperlipidemia Father      Hypertension Father      Diabetes Brother      Diabetes Sister        REVIEW OF SYSTEMS  General: negative for, night sweats, dizziness, fatigue  Resp: No shortness of breath and no cough  CV: negative for chest pain, syncope or near-syncope  GI: negative for nausea, vomiting and diarrhea  : negative for dysuria and hematuria  Musculoskeletal: as above  Neurologic: negative for syncope   Hematologic: negative for bleeding disorder    Physical Exam:  Vitals: BP (!) 150/102   Pulse 85   Temp 97.1  F (36.2  C) (Temporal)   Ht 1.803 m (5' 11\")   Wt 91.6 kg (202 lb)   BMI 28.17 kg/m    BMI= Body mass index is 28.17 kg/m .  Constitutional: healthy, alert and no acute distress   Psychiatric: mentation appears normal and affect normal/bright  NEURO: no focal deficits  SKIN: .well healed, no erythema, no incision breakdown and no drainage.  JOINT/EXTREMITIES: No focal areas of pain.  Passive motion tested in 90/90 with some tightness.  Distal neurovascular is grossly intact  GAIT: not tested     Diagnostic Modalities:  None today.  Independent visualization " of the images was performed.      Impression:   Chief Complaint   Patient presents with     RECHECK     dos: 2/24/2020~Right shoulder arthroscopy with arthroscopic rotator cuff repair (double row supraspinatus and infraspinatus repair, single row subscapularis repair), and labral biceps stump debridement, subacromial decompression with partial acromioplasty, and distal clavicle excision~   Overall progressing.  May be doing a little too much.  I again discussed his restrictions.    Plan:   Due to the virus limitations he is not been able to start physical therapy.  I feel it essential being postoperative for his rotator cuff that he get some formal therapy.  We were able to arrange a visit today.  He may need in person visits more frequently.  I will leave it to him and the therapist to decide.    Return to clinic 4-6 , week(s), or sooner as needed for changes.    Re-x-ray on return: No    Joel Edmond D.O.

## 2020-04-15 NOTE — LETTER
"    4/15/2020         RE: Amanuel Mccord  [address]  [facility] [address]        Dear Colleague,    Thank you for referring your patient, Amanuel Mccord, to the [facility]. Please see a copy of my visit note below.    Orthopedic Clinic Post-Operative Note    CHIEF COMPLAINT:   Chief Complaint   Patient presents with     RECHECK     dos: 2/24/2020~Right shoulder arthroscopy with arthroscopic rotator cuff repair (double row supraspinatus and infraspinatus repair, single row subscapularis repair), and labral biceps stump debridement, subacromial decompression with partial acromioplasty, and distal clavicle excision~       HISTORY OF PRESENT ILLNESS  Overall making progress.  Pain is getting better.  He reports he might be doing too much at times.  He is out doing some lawn \"raking with the other arm but every once in a while the right side goes with it.\"    Patient's past medical, surgical, social and family histories reviewed.     Past Medical History:   Diagnosis Date     PONV (postoperative nausea and vomiting)        Past Surgical History:   Procedure Laterality Date     ARTHROSCOPY SHOULDER DECOMPRESSION Right 2/24/2020    Procedure: subacromial decompression with partial acromioplasty;  Surgeon: Hong Edmond DO;  [facility]     ARTHROSCOPY SHOULDER DISTAL CLAVICLE REPAIR Right 2/24/2020    Procedure: distal clavicle excision;  Surgeon: Hong Edmond DO;  [facility]     ARTHROSCOPY SHOULDER ROTATOR CUFF REPAIR Right 2/24/2020    Procedure: right shoulder arthroscopy with rotator cuff repair and labral debridement;  Surgeon: Hong Edmond DO;  [facility]       Medications:  acetaminophen (TYLENOL) 325 MG tablet, Take 3 tablets (975 mg) by mouth every 8 hours as needed for mild pain  amLODIPine-benazepril (LOTREL) 5-20 MG capsule, Take 1 capsule by mouth daily  atorvastatin (LIPITOR) 80 MG tablet, Take 1 tablet (80 mg) by mouth every " "morning Must keep upcoming appointment for refills.  diclofenac (VOLTAREN) 75 MG EC tablet, Take 1 tablet (75 mg) by mouth 2 times daily as needed for moderate pain  glimepiride (AMARYL) 1 MG tablet, Take 1 tablet (1 mg) by mouth daily  JANUMET  MG tablet, TAKE ONE TABLET BY MOUTH TWICE A DAY WITH MEALS  oxyCODONE (ROXICODONE) 5 MG tablet, Take 1 tablet (5 mg) by mouth every 6 hours as needed for moderate to severe pain  sildenafil (VIAGRA) 100 MG tablet, TAKE ONE TABLET BY MOUTH DAILY AS NEEDED  tiZANidine (ZANAFLEX) 2 MG tablet, Take 1-2 tablets (2-4 mg) by mouth 3 times daily as needed for muscle spasms    No current facility-administered medications on file prior to visit.       No Known Allergies    Social History     Occupational History     Not on file   Tobacco Use     Smoking status: Current Every Day Smoker     Types: Cigarettes     Smokeless tobacco: Never Used   Substance and Sexual Activity     Alcohol use: Yes     Drug use: No     Sexual activity: Yes       Family History   Problem Relation Age of Onset     Hypertension Mother      Hyperlipidemia Mother      Hyperlipidemia Father      Hypertension Father      Diabetes Brother      Diabetes Sister        REVIEW OF SYSTEMS  General: negative for, night sweats, dizziness, fatigue  Resp: No shortness of breath and no cough  CV: negative for chest pain, syncope or near-syncope  GI: negative for nausea, vomiting and diarrhea  : negative for dysuria and hematuria  Musculoskeletal: as above  Neurologic: negative for syncope   Hematologic: negative for bleeding disorder    Physical Exam:  Vitals: BP (!) 150/102   Pulse 85   Temp 97.1  F (36.2  C) (Temporal)   Ht 1.803 m (5' 11\")   Wt 91.6 kg (202 lb)   BMI 28.17 kg/m    BMI= Body mass index is 28.17 kg/m .  Constitutional: healthy, alert and no acute distress   Psychiatric: mentation appears normal and affect normal/bright  NEURO: no focal deficits  SKIN: .well healed, no erythema, no incision " breakdown and no drainage.  JOINT/EXTREMITIES: No focal areas of pain.  Passive motion tested in 90/90 with some tightness.  Distal neurovascular is grossly intact  GAIT: not tested     Diagnostic Modalities:  None today.  Independent visualization of the images was performed.      Impression:   Chief Complaint   Patient presents with     RECHECK     dos: 2/24/2020~Right shoulder arthroscopy with arthroscopic rotator cuff repair (double row supraspinatus and infraspinatus repair, single row subscapularis repair), and labral biceps stump debridement, subacromial decompression with partial acromioplasty, and distal clavicle excision~   Overall progressing.  May be doing a little too much.  I again discussed his restrictions.    Plan:   Due to the virus limitations he is not been able to start physical therapy.  I feel it essential being postoperative for his rotator cuff that he get some formal therapy.  We were able to arrange a visit today.  He may need in person visits more frequently.  I will leave it to him and the therapist to decide.    Return to clinic 4-6 , week(s), or sooner as needed for changes.    Re-x-ray on return: No    Joel Edmond D.O.    Again, thank you for allowing me to participate in the care of your patient.        Sincerely,        Hong Edmond, DO

## 2020-04-15 NOTE — PROGRESS NOTES
04/15/20 1000   General Information   Type of Visit Initial OP Ortho PT Evaluation   Start of Care Date 04/15/20   Referring Physician    Patient/Family Goals Statement To have increase ROM and to have full function of the right shoulder   Orders Evaluate and Treat   Date of Order 04/15/20   Certification Required? No   Medical Diagnosis Complete tear of the right rotator cuff. unspecified whether traumatic   Surgical/Medical history reviewed Yes   Precautions/Limitations   (follow the protocol)   Special Instructions Weaning off of the sling   Body Part(s)   Body Part(s) Shoulder   Presentation and Etiology   Pertinent history of current problem (include personal factors and/or comorbidities that impact the POC) Patient sustained a massive rotator cuff tear, patient thinks just over time does not remember a particuilar injury that caused it. Patient underwent a masive rotator cuff repair on 2/24/20. Please see EPIC for details, had a acromialplasty also and subaccromial decompression. Biceps was not repaired. Patient has no pain with daily funcion when out of the sling as long as he is keeping the arm to a low level.    Impairments A. Pain;D. Decreased ROM;E. Decreased flexibility;F. Decreased strength and endurance;I. Impaired skin integrity   Functional Limitations perform activities of daily living;perform required work activities;perform desired leisure / sports activities   Symptom Location right shoulder   How/Where did it occur From insidious onset   Onset date of current episode/exacerbation 02/24/20   Chronicity New   Pain/symptoms exacerbated by G. Certain positions;K. Home tasks;J. ADL   Pain/symptoms eased by C. Rest   Current / Previous Interventions   Diagnostic Tests:   (see shoulder MRI in EPIC)   Prior Level of Function   Prior Level of Function-Mobility independent   Current Level of Function   Patient role/employment history A. Employed  (currently off work due to shoulder surgery)    Employment Comments Works as a Line [employer]   [facility]   Fall Risk Screen   Fall screen completed by PT   Have you fallen 2 or more times in the past year? No   Have you fallen and had an injury in the past year? No   Is patient a fall risk? No   Shoulder Objective Findings   Side (if bilateral, select both right and left) Right   Integumentary  healed incisions   Posture rounded shoulders   Shoulder ROM Comment With any AROM in standing pt is hiking the shoulder   Scapulothoracic Rhythm poor winging   Palpation minimal pain with palpation of the right shoulder. tightness noted inf scapula, teres    Right Shoulder Flexion AROM 65 degrees (hiking the shoulder)   Right Shoulder Flexion PROM 150   Right Shoulder Abduction AROM 80   Right Shoulder Abduction PROM 90   Right Shoulder ER PROM 45 with shoulder abducted to 45   Right Shoulder IR AROM with cane to waist per protocol   Right Shoulder Flexion Strength 3-   Right Shoulder Abduction Strength 3-   Right Shoulder ER Strength 2   Right Shoulder IR Strength 2   Planned Therapy Interventions   Planned Therapy Interventions joint mobilization;manual therapy;neuromuscular re-education;ROM;strengthening;stretching   Planned Modality Interventions   Planned Modality Interventions Ultrasound   Clinical Impression   Criteria for Skilled Therapeutic Interventions Met yes, treatment indicated   PT Diagnosis right shoulder pain. decrease ROM, decrease strength, s/p rotator cuff repair   Influenced by the following impairments rotator cuff tear-massive   Functional limitations due to impairments Cannot reach over head very limited ROM and very limited strength   Clinical Presentation Evolving/Changing   Clinical Presentation Rationale all motions limited   Clinical Decision Making (Complexity) Moderate complexity   Therapy Frequency other (see comments)  (due to Covid 19 pt prefers to do every 2 wks)   Predicted Duration of Therapy Intervention (days/wks)  15 wks   Risk & Benefits of therapy have been explained Yes   Patient, Family & other staff in agreement with plan of care Yes   Clinical Impression Comments Patient is 7 wks out from massive rotator cuff repair, weaning off the sling , minimal pain but severe loss in ROM and strength   Education Assessment   Preferred Learning Style Listening;Demonstration;Reading   Barriers to Learning No barriers   ORTHO GOALS   PT Ortho Eval Goals 1;2;3   Ortho Goal 1   Goal Identifier 1   Goal Description Patient is able to reach with full AROM over head, good form and no hiking of the shoulder. This is while reaching into a top cupboard.    Target Date 07/29/20   Ortho Goal 2   Goal Identifier 2   Goal Description Patient has full strength of the right shoulder in all ranges, for full functional ability    Target Date 07/29/20   Ortho Goal 3   Goal Identifier 3   Goal Description Patient is able to return to work with no restrictions and for mobility of the right shoulder   Target Date 07/29/20   Total Evaluation Time   PT Jose Eduardo, Moderate Complexity Minutes (02048) 1   Thank you for the referral,            Cherelle Gonzales PT

## 2020-05-06 ENCOUNTER — TELEPHONE (OUTPATIENT)
Dept: ORTHOPEDICS | Facility: CLINIC | Age: 60
End: 2020-05-06

## 2020-05-06 ENCOUNTER — HOSPITAL ENCOUNTER (OUTPATIENT)
Dept: PHYSICAL THERAPY | Facility: CLINIC | Age: 60
Setting detail: THERAPIES SERIES
End: 2020-05-06
Attending: ORTHOPAEDIC SURGERY
Payer: COMMERCIAL

## 2020-05-06 DIAGNOSIS — Z98.890 S/P COMPLETE REPAIR OF ROTATOR CUFF: Primary | ICD-10-CM

## 2020-05-06 PROCEDURE — 97110 THERAPEUTIC EXERCISES: CPT | Mod: GP | Performed by: PHYSICAL THERAPIST

## 2020-05-06 RX ORDER — MELOXICAM 15 MG/1
15 TABLET ORAL DAILY
Qty: 30 TABLET | Refills: 1 | Status: SHIPPED | OUTPATIENT
Start: 2020-05-06 | End: 2020-05-27 | Stop reason: ALTCHOICE

## 2020-05-06 NOTE — TELEPHONE ENCOUNTER
I was contacted by the physical therapy team today this patient was working in physical therapy.  He would like to do a more powerful anti-inflammatory so I am going to give him some Mobic.  He was taking Aleve before he will stop the Aleve and try Mobic for a while and see if this does not help him.  He also is reminded  not to overdo it and listen to his body.  He understands.

## 2020-05-20 ENCOUNTER — HOSPITAL ENCOUNTER (OUTPATIENT)
Dept: PHYSICAL THERAPY | Facility: CLINIC | Age: 60
Setting detail: THERAPIES SERIES
End: 2020-05-20
Attending: ORTHOPAEDIC SURGERY
Payer: COMMERCIAL

## 2020-05-20 PROCEDURE — 97110 THERAPEUTIC EXERCISES: CPT | Mod: GP | Performed by: PHYSICAL THERAPIST

## 2020-05-27 ENCOUNTER — OFFICE VISIT (OUTPATIENT)
Dept: ORTHOPEDICS | Facility: CLINIC | Age: 60
End: 2020-05-27
Payer: COMMERCIAL

## 2020-05-27 VITALS
BODY MASS INDEX: 27.72 KG/M2 | HEIGHT: 71 IN | SYSTOLIC BLOOD PRESSURE: 100 MMHG | DIASTOLIC BLOOD PRESSURE: 60 MMHG | WEIGHT: 198 LBS

## 2020-05-27 DIAGNOSIS — Z98.890 S/P COMPLETE REPAIR OF ROTATOR CUFF: Primary | ICD-10-CM

## 2020-05-27 PROCEDURE — 99213 OFFICE O/P EST LOW 20 MIN: CPT | Performed by: ORTHOPAEDIC SURGERY

## 2020-05-27 ASSESSMENT — MIFFLIN-ST. JEOR: SCORE: 1735.25

## 2020-05-27 ASSESSMENT — PAIN SCALES - GENERAL: PAINLEVEL: MODERATE PAIN (4)

## 2020-05-27 NOTE — LETTER
"    5/27/2020         RE: Amanuel Mccord  [address]  [facility] [address]        Dear Colleague,    Thank you for referring your patient, Amanuel Mccord, to the [facility]. Please see a copy of my visit note below.    Orthopedic Clinic Post-Operative Note    CHIEF COMPLAINT:   Chief Complaint   Patient presents with     RECHECK     right shoulder follow up     Surgical Followup     dos: 2/24/2020~Right shoulder arthroscopy with arthroscopic rotator cuff repair (double row supraspinatus and infraspinatus repair, single row subscapularis repair), and labral biceps stump debridement, subacromial decompression with partial acromioplasty, and distal clavicle excision~3 months postop       HISTORY OF PRESENT ILLNESS  Reports he feels like he is doing \"good\".  He may have \"overdone it this weekend\".  It was sore Monday and Tuesday.  He is been doing therapy.    Patient's past medical, surgical, social and family histories reviewed.     Past Medical History:   Diagnosis Date     PONV (postoperative nausea and vomiting)        Past Surgical History:   Procedure Laterality Date     ARTHROSCOPY SHOULDER DECOMPRESSION Right 2/24/2020    Procedure: subacromial decompression with partial acromioplasty;  Surgeon: Hong Edmond DO;  Location: [facility]     ARTHROSCOPY SHOULDER DISTAL CLAVICLE REPAIR Right 2/24/2020    Procedure: distal clavicle excision;  Surgeon: Hong Edmond DO;  Location[facility]     ARTHROSCOPY SHOULDER ROTATOR CUFF REPAIR Right 2/24/2020    Procedure: right shoulder arthroscopy with rotator cuff repair and labral debridement;  Surgeon: Hong Edmond DO;  Location: [facility]       Medications:  acetaminophen (TYLENOL) 325 MG tablet, Take 3 tablets (975 mg) by mouth every 8 hours as needed for mild pain  amLODIPine-benazepril (LOTREL) 5-20 MG capsule, Take 1 capsule by mouth daily  atorvastatin (LIPITOR) 80 MG tablet, Take 1 tablet (80 mg) by mouth every " "morning Must keep upcoming appointment for refills.  diclofenac (VOLTAREN) 75 MG EC tablet, Take 1 tablet (75 mg) by mouth 2 times daily as needed for moderate pain  glimepiride (AMARYL) 1 MG tablet, Take 1 tablet (1 mg) by mouth daily  JANUMET  MG tablet, TAKE ONE TABLET BY MOUTH TWICE A DAY WITH MEALS  sildenafil (VIAGRA) 100 MG tablet, TAKE ONE TABLET BY MOUTH DAILY AS NEEDED    No current facility-administered medications on file prior to visit.       No Known Allergies    Social History     Occupational History     Not on file   Tobacco Use     Smoking status: Current Every Day Smoker     Types: Cigarettes     Smokeless tobacco: Never Used   Substance and Sexual Activity     Alcohol use: Yes     Drug use: No     Sexual activity: Yes       Family History   Problem Relation Age of Onset     Hypertension Mother      Hyperlipidemia Mother      Hyperlipidemia Father      Hypertension Father      Diabetes Brother      Diabetes Sister        REVIEW OF SYSTEMS  General: negative for, night sweats, dizziness, fatigue  Resp: No shortness of breath and no cough  CV: negative for chest pain, syncope or near-syncope  GI: negative for nausea, vomiting and diarrhea  : negative for dysuria and hematuria  Musculoskeletal: as above  Neurologic: negative for syncope   Hematologic: negative for bleeding disorder    Physical Exam:  Vitals: /60   Ht 1.803 m (5' 11\")   Wt 89.8 kg (198 lb)   BMI 27.62 kg/m    BMI= Body mass index is 27.62 kg/m .  Constitutional: healthy, alert and no acute distress   Psychiatric: mentation appears normal and affect normal/bright  NEURO: no focal deficits  SKIN: .well healed, no erythema, no incision breakdown and no drainage.  JOINT/EXTREMITIES: Active forward elevation and abduction limited to approximately 40 degrees.  He has significant coupling of motion with any attempted to further.  Passively able to take him to approximately 140/140.  He has a positive drop arm.  Unable to " hold arm at all testing supraspinatus.  GAIT: not tested     Diagnostic Modalities:  None today.  Independent visualization of the images was performed.      Impression:   Chief Complaint   Patient presents with     RECHECK     right shoulder follow up     Surgical Followup     dos: 2/24/2020~Right shoulder arthroscopy with arthroscopic rotator cuff repair (double row supraspinatus and infraspinatus repair, single row subscapularis repair), and labral biceps stump debridement, subacromial decompression with partial acromioplasty, and distal clavicle excision~3 months postop   Significant weakness    Plan:   I discussed his findings.  I am worried about his rotator cuff.  I am concerned about a tendon re-tear.  I recommend a repeat MRI for evaluation.  Return to clinic to discuss test results, or sooner as needed for changes.    Re-x-ray on return: No    Joel Edmond D.O.    Again, thank you for allowing me to participate in the care of your patient.        Sincerely,        Hong Edmond, DO

## 2020-05-27 NOTE — PROGRESS NOTES
"Orthopedic Clinic Post-Operative Note    CHIEF COMPLAINT:   Chief Complaint   Patient presents with     RECHECK     right shoulder follow up     Surgical Followup     dos: 2/24/2020~Right shoulder arthroscopy with arthroscopic rotator cuff repair (double row supraspinatus and infraspinatus repair, single row subscapularis repair), and labral biceps stump debridement, subacromial decompression with partial acromioplasty, and distal clavicle excision~3 months postop       HISTORY OF PRESENT ILLNESS  Reports he feels like he is doing \"good\".  He may have \"overdone it this weekend\".  It was sore Monday and Tuesday.  He is been doing therapy.    Patient's past medical, surgical, social and family histories reviewed.     Past Medical History:   Diagnosis Date     PONV (postoperative nausea and vomiting)        Past Surgical History:   Procedure Laterality Date     ARTHROSCOPY SHOULDER DECOMPRESSION Right 2/24/2020    Procedure: subacromial decompression with partial acromioplasty;  Surgeon: Hong Edmond DO;  [facility]     ARTHROSCOPY SHOULDER DISTAL CLAVICLE REPAIR Right 2/24/2020    Procedure: distal clavicle excision;  Surgeon: Hong Edmond DO;  [facility]     ARTHROSCOPY SHOULDER ROTATOR CUFF REPAIR Right 2/24/2020    Procedure: right shoulder arthroscopy with rotator cuff repair and labral debridement;  Surgeon: Hong Edmond DO;  Location[facility]       Medications:  acetaminophen (TYLENOL) 325 MG tablet, Take 3 tablets (975 mg) by mouth every 8 hours as needed for mild pain  amLODIPine-benazepril (LOTREL) 5-20 MG capsule, Take 1 capsule by mouth daily  atorvastatin (LIPITOR) 80 MG tablet, Take 1 tablet (80 mg) by mouth every morning Must keep upcoming appointment for refills.  diclofenac (VOLTAREN) 75 MG EC tablet, Take 1 tablet (75 mg) by mouth 2 times daily as needed for moderate pain  glimepiride (AMARYL) 1 MG tablet, Take 1 tablet (1 mg) by mouth daily  JANUMET " " MG tablet, TAKE ONE TABLET BY MOUTH TWICE A DAY WITH MEALS  sildenafil (VIAGRA) 100 MG tablet, TAKE ONE TABLET BY MOUTH DAILY AS NEEDED    No current facility-administered medications on file prior to visit.       No Known Allergies    Social History     Occupational History     Not on file   Tobacco Use     Smoking status: Current Every Day Smoker     Types: Cigarettes     Smokeless tobacco: Never Used   Substance and Sexual Activity     Alcohol use: Yes     Drug use: No     Sexual activity: Yes       Family History   Problem Relation Age of Onset     Hypertension Mother      Hyperlipidemia Mother      Hyperlipidemia Father      Hypertension Father      Diabetes Brother      Diabetes Sister        REVIEW OF SYSTEMS  General: negative for, night sweats, dizziness, fatigue  Resp: No shortness of breath and no cough  CV: negative for chest pain, syncope or near-syncope  GI: negative for nausea, vomiting and diarrhea  : negative for dysuria and hematuria  Musculoskeletal: as above  Neurologic: negative for syncope   Hematologic: negative for bleeding disorder    Physical Exam:  Vitals: /60   Ht 1.803 m (5' 11\")   Wt 89.8 kg (198 lb)   BMI 27.62 kg/m    BMI= Body mass index is 27.62 kg/m .  Constitutional: healthy, alert and no acute distress   Psychiatric: mentation appears normal and affect normal/bright  NEURO: no focal deficits  SKIN: .well healed, no erythema, no incision breakdown and no drainage.  JOINT/EXTREMITIES: Active forward elevation and abduction limited to approximately 40 degrees.  He has significant coupling of motion with any attempted to further.  Passively able to take him to approximately 140/140.  He has a positive drop arm.  Unable to hold arm at all testing supraspinatus.  GAIT: not tested     Diagnostic Modalities:  None today.  Independent visualization of the images was performed.      Impression:   Chief Complaint   Patient presents with     RECHECK     right shoulder " follow up     Surgical Followup     dos: 2/24/2020~Right shoulder arthroscopy with arthroscopic rotator cuff repair (double row supraspinatus and infraspinatus repair, single row subscapularis repair), and labral biceps stump debridement, subacromial decompression with partial acromioplasty, and distal clavicle excision~3 months postop   Significant weakness    Plan:   I discussed his findings.  I am worried about his rotator cuff.  I am concerned about a tendon re-tear.  I recommend a repeat MRI for evaluation.  Return to clinic to discuss test results, or sooner as needed for changes.    Re-x-ray on return: No    Joel Edmond D.O.

## 2020-06-19 ENCOUNTER — VIRTUAL VISIT (OUTPATIENT)
Dept: FAMILY MEDICINE | Facility: CLINIC | Age: 60
End: 2020-06-19
Payer: COMMERCIAL

## 2020-06-19 DIAGNOSIS — E11.40 TYPE 2 DIABETES MELLITUS WITH DIABETIC NEUROPATHY, WITHOUT LONG-TERM CURRENT USE OF INSULIN (H): Primary | ICD-10-CM

## 2020-06-19 DIAGNOSIS — G89.29 CHRONIC RIGHT SHOULDER PAIN: ICD-10-CM

## 2020-06-19 DIAGNOSIS — M25.511 CHRONIC RIGHT SHOULDER PAIN: ICD-10-CM

## 2020-06-19 PROCEDURE — 99214 OFFICE O/P EST MOD 30 MIN: CPT | Mod: TEL | Performed by: FAMILY MEDICINE

## 2020-06-19 RX ORDER — ATORVASTATIN CALCIUM 80 MG/1
80 TABLET, FILM COATED ORAL EVERY MORNING
Qty: 90 TABLET | Refills: 3 | Status: SHIPPED | OUTPATIENT
Start: 2020-06-19 | End: 2021-09-30

## 2020-06-19 RX ORDER — AMLODIPINE AND BENAZEPRIL HYDROCHLORIDE 5; 20 MG/1; MG/1
1 CAPSULE ORAL DAILY
Qty: 90 CAPSULE | Refills: 3 | Status: SHIPPED | OUTPATIENT
Start: 2020-06-19 | End: 2021-09-30

## 2020-06-19 RX ORDER — GLIMEPIRIDE 1 MG/1
1 TABLET ORAL DAILY
Qty: 90 TABLET | Refills: 3 | Status: SHIPPED | OUTPATIENT
Start: 2020-06-19 | End: 2021-09-30

## 2020-06-19 NOTE — PROGRESS NOTES
"Amanuel Mccord is a 59 year old male who is being evaluated via a billable telephone visit.      The patient has been notified of following:     \"This telephone visit will be conducted via a call between you and your physician/provider. We have found that certain health care needs can be provided without the need for a physical exam.  This service lets us provide the care you need with a short phone conversation.  If a prescription is necessary we can send it directly to your pharmacy.  If lab work is needed we can place an order for that and you can then stop by our lab to have the test done at a later time.    Telephone visits are billed at different rates depending on your insurance coverage. During this emergency period, for some insurers they may be billed the same as an in-person visit.  Please reach out to your insurance provider with any questions.    If during the course of the call the physician/provider feels a telephone visit is not appropriate, you will not be charged for this service.\"    Patient has given verbal consent for Telephone visit?  Yes    What phone number would you like to be contacted at? [phone]        Subjective     Amanuel Mccord is a 59 year old male who presents via phone visit today for the following health issues:    HPI   States he needs his diabetes meds refilled. Says his numbers are doing well. He remains active as he is retired but is limited due to his shoulder surgery.  He wishes to get his meds refilled and get his labs ordered.  Tolerating meds without side effects.     Notes his shoulder surgery didn't go all that well and is struggling with PT right now.  His right hip is flaring up and is causing more pain, along with his shoulder.  He is not wanting to take narcotics so has been trying other things.  He tried hemp oil and that did nothing.  He then took some THC oil from a friend and it seemed to help, so wondering about getting medical marijuana as a trial to " see if that is going to be a better option for him.  I noted that we do have a pain provider here whose partners do certify medical marijuana and perhaps that arrangement could be made.        Review of Systems   CONSTITUTIONAL: NEGATIVE for fever, chills, change in weight  ENT/MOUTH: NEGATIVE for ear, mouth and throat problems  RESP: NEGATIVE for significant cough or SOB  CV: NEGATIVE for chest pain, palpitations or peripheral edema       Objective   Reported vitals:  There were no vitals taken for this visit.   healthy, alert and no distress  PSYCH: Alert and oriented times 3; coherent speech, normal   rate and volume, able to articulate logical thoughts, able   to abstract reason, no tangential thoughts, no hallucinations   or delusions  His affect is a bit anxious/high strung but this is his baseline.  He does not answer questions directly and is evasive or changes topic.    RESP: No cough, no audible wheezing, able to talk in full sentences  Remainder of exam unable to be completed due to telephone visits        Assessment/Plan:  1. Type 2 diabetes mellitus with diabetic neuropathy, without long-term current use of insulin (H)  Patient is evasive in reporting any actual numbers for his blood sugars but repeatedly says they are fine, please refill my meds and order my labs and I will come in next week.  I did refill meds presuming changes won't be needed. He has been in for several ortho visits and bood pressures have bene variable from as high as 171/99 to as low as 100/60 on his last visit.  No changes at this point in meds.  Also need to get labs to determine if diabetes meds need any adjustments.   Will see in clinic if able in 3 months.  Reminded him he needs an eye exam as well and eventually colon cancer screening but wishes to get his shoulder improved first.     - amLODIPine-benazepril (LOTREL) 5-20 MG capsule; Take 1 capsule by mouth daily  Dispense: 90 capsule; Refill: 3  - atorvastatin (LIPITOR) 80  MG tablet; Take 1 tablet (80 mg) by mouth every morning Must keep upcoming appointment for refills.  Dispense: 90 tablet; Refill: 3  - glimepiride (AMARYL) 1 MG tablet; Take 1 tablet (1 mg) by mouth daily  Dispense: 90 tablet; Refill: 3  - **Basic metabolic panel FUTURE anytime; Future  - **A1C FUTURE anytime; Future  - Albumin Random Urine Quantitative with Creat Ratio; Future    2. Chronic right shoulder pain  Looking for pain management options of non-narcotic variety.  THC oil on the tongue was beneficial and would like to consider medical marijuana.   - PAIN MANAGEMENT REFERRAL    Return in about 3 months (around 9/19/2020) for In-Clinic Visit.      Phone call duration: 18 minutes    Gregory G. Schoen, MD

## 2020-06-22 ENCOUNTER — TELEPHONE (OUTPATIENT)
Dept: PALLIATIVE MEDICINE | Facility: CLINIC | Age: 60
End: 2020-06-22

## 2020-06-22 NOTE — LETTER
July 24, 2020    Amanuel Mccord  [address]   [place] [address]    Dear Garret Vital to the [facility].  We are located at [address] [place] [address] [place] [address]. Your appointment at the [facility] has been scheduled on August 3rd at 9:00 AM with YADIEL Moe. This will be a video visit, so you DO NOT need to come to the clinic.    At your first visit, you will meet your team of caregivers who will help you to develop pain management strategies that will last a lifetime. You will meet with our support staff to review your insurance information, and collect your co-payment if required by your insurance company. You will also meet with a medical pain specialist and care coordinator who will assess your pain and develop a plan of care for your successful pain rehabilitation. You should expect to spend approximately 1 hour at your first visit with us. Usually, patients work with us for a period of 6-12 months, and eventually return to their primary doctor once their pain management has stabilized.      To help us make your visit go as smoothly as possible, please mail back the following items with you on your visit:       Completed Pain Questionnaire enclosed in this packet.  If you do not mail back the completed questionnaire, we may have to reschedule your appointment.      Due to the demand for new patient evaluations, you must notify the scheduling department 48 hours (2 days) in advance if you are not able to keep this appointment. Failure to do so could affect your ability to reschedule with our clinic. Please be aware that we will not prescribe any medications at your first visit.     Please call [phone] with any questions regarding your appointment. We look forward to meeting you and working to address your health care needs.     Sincerely,    [facility]

## 2020-06-29 ENCOUNTER — HOSPITAL ENCOUNTER (OUTPATIENT)
Dept: MRI IMAGING | Facility: CLINIC | Age: 60
Discharge: HOME | End: 2020-06-29
Attending: ORTHOPAEDIC SURGERY | Admitting: ORTHOPAEDIC SURGERY
Payer: COMMERCIAL

## 2020-06-29 DIAGNOSIS — Z98.890 S/P COMPLETE REPAIR OF ROTATOR CUFF: ICD-10-CM

## 2020-06-29 PROCEDURE — 73221 MRI JOINT UPR EXTREM W/O DYE: CPT | Mod: RT

## 2020-07-06 ENCOUNTER — OFFICE VISIT (OUTPATIENT)
Dept: ORTHOPEDICS | Facility: CLINIC | Age: 60
End: 2020-07-06
Payer: COMMERCIAL

## 2020-07-06 VITALS
BODY MASS INDEX: 27.44 KG/M2 | HEIGHT: 71 IN | DIASTOLIC BLOOD PRESSURE: 74 MMHG | SYSTOLIC BLOOD PRESSURE: 131 MMHG | WEIGHT: 196 LBS

## 2020-07-06 DIAGNOSIS — Z98.890 S/P COMPLETE REPAIR OF ROTATOR CUFF: Primary | ICD-10-CM

## 2020-07-06 DIAGNOSIS — M12.811 ROTATOR CUFF ARTHROPATHY OF RIGHT SHOULDER: ICD-10-CM

## 2020-07-06 DIAGNOSIS — E11.40 TYPE 2 DIABETES MELLITUS WITH DIABETIC NEUROPATHY, WITHOUT LONG-TERM CURRENT USE OF INSULIN (H): ICD-10-CM

## 2020-07-06 DIAGNOSIS — M75.121 COMPLETE TEAR OF RIGHT ROTATOR CUFF, UNSPECIFIED WHETHER TRAUMATIC: ICD-10-CM

## 2020-07-06 LAB
ANION GAP SERPL CALCULATED.3IONS-SCNC: 3 MMOL/L (ref 3–14)
BUN SERPL-MCNC: 14 MG/DL (ref 7–30)
CALCIUM SERPL-MCNC: 9.1 MG/DL (ref 8.5–10.1)
CHLORIDE SERPL-SCNC: 105 MMOL/L (ref 94–109)
CO2 SERPL-SCNC: 30 MMOL/L (ref 20–32)
CREAT SERPL-MCNC: 0.68 MG/DL (ref 0.66–1.25)
CREAT UR-MCNC: 88 MG/DL
GFR SERPL CREATININE-BSD FRML MDRD: >90 ML/MIN/{1.73_M2}
GLUCOSE SERPL-MCNC: 127 MG/DL (ref 70–99)
HBA1C MFR BLD: 6.7 % (ref 0–5.6)
MICROALBUMIN UR-MCNC: 38 MG/L
MICROALBUMIN/CREAT UR: 43.46 MG/G CR (ref 0–17)
POTASSIUM SERPL-SCNC: 4.4 MMOL/L (ref 3.4–5.3)
SODIUM SERPL-SCNC: 138 MMOL/L (ref 133–144)

## 2020-07-06 PROCEDURE — 83036 HEMOGLOBIN GLYCOSYLATED A1C: CPT | Mod: QW | Performed by: FAMILY MEDICINE

## 2020-07-06 PROCEDURE — 82043 UR ALBUMIN QUANTITATIVE: CPT | Performed by: FAMILY MEDICINE

## 2020-07-06 PROCEDURE — 99213 OFFICE O/P EST LOW 20 MIN: CPT | Performed by: ORTHOPAEDIC SURGERY

## 2020-07-06 PROCEDURE — 36415 COLL VENOUS BLD VENIPUNCTURE: CPT | Performed by: FAMILY MEDICINE

## 2020-07-06 PROCEDURE — 80048 BASIC METABOLIC PNL TOTAL CA: CPT | Performed by: FAMILY MEDICINE

## 2020-07-06 ASSESSMENT — PAIN SCALES - GENERAL: PAINLEVEL: MODERATE PAIN (4)

## 2020-07-06 ASSESSMENT — MIFFLIN-ST. JEOR: SCORE: 1726.18

## 2020-07-06 NOTE — PROGRESS NOTES
Orthopedic Clinic Post-Operative Note    CHIEF COMPLAINT:   Chief Complaint   Patient presents with     RECHECK     mri results of right shoulder     Surgical Followup     dos: 2/24/2020~Right shoulder arthroscopy with arthroscopic rotator cuff repair (double row supraspinatus and infraspinatus repair, single row subscapularis repair), and labral biceps stump debridement, subacromial decompression with partial acromioplasty, and distal clavicle excision~5 months postop       HISTORY OF PRESENT ILLNESS  Returns to clinic. Overall not much improved since last visit. States the shoulder has some good some bad days.  Is still working on home exercises but stopped outpatient physical therapy.  Overall feels the shoulder is not much better than it was prior to surgery. Has been able to modify activities and work around the house to avoid lifting above shoulder level.     Patient's past medical, surgical, social and family histories reviewed.     Past Medical History:   Diagnosis Date     PONV (postoperative nausea and vomiting)        Past Surgical History:   Procedure Laterality Date     ARTHROSCOPY SHOULDER DECOMPRESSION Right 2/24/2020    Procedure: subacromial decompression with partial acromioplasty;  Surgeon: Hong Edmond DO;  Location: [facility]     ARTHROSCOPY SHOULDER DISTAL CLAVICLE REPAIR Right 2/24/2020    Procedure: distal clavicle excision;  Surgeon: Hong Edmond DO;  Location: [facility]     ARTHROSCOPY SHOULDER ROTATOR CUFF REPAIR Right 2/24/2020    Procedure: right shoulder arthroscopy with rotator cuff repair and labral debridement;  Surgeon: Hong Edmond DO;  Location: [facility]       Medications:  acetaminophen (TYLENOL) 325 MG tablet, Take 3 tablets (975 mg) by mouth every 8 hours as needed for mild pain  amLODIPine-benazepril (LOTREL) 5-20 MG capsule, Take 1 capsule by mouth daily  atorvastatin (LIPITOR) 80 MG tablet, Take 1 tablet (80 mg) by mouth every morning Must keep  "upcoming appointment for refills.  diclofenac (VOLTAREN) 75 MG EC tablet, Take 1 tablet (75 mg) by mouth 2 times daily as needed for moderate pain  glimepiride (AMARYL) 1 MG tablet, Take 1 tablet (1 mg) by mouth daily  JANUMET  MG tablet, TAKE ONE TABLET BY MOUTH TWICE A DAY WITH MEALS  sildenafil (VIAGRA) 100 MG tablet, TAKE ONE TABLET BY MOUTH DAILY AS NEEDED    No current facility-administered medications on file prior to visit.       No Known Allergies    Social History     Occupational History     Not on file   Tobacco Use     Smoking status: Current Every Day Smoker     Types: Cigarettes     Smokeless tobacco: Never Used   Substance and Sexual Activity     Alcohol use: Yes     Drug use: No     Sexual activity: Yes       Family History   Problem Relation Age of Onset     Hypertension Mother      Hyperlipidemia Mother      Hyperlipidemia Father      Hypertension Father      Diabetes Brother      Diabetes Sister        REVIEW OF SYSTEMS  General: negative for, night sweats, dizziness, fatigue  Resp: No shortness of breath and no cough  CV: negative for chest pain, syncope or near-syncope  GI: negative for nausea, vomiting and diarrhea  : negative for dysuria and hematuria  Musculoskeletal: as above  Neurologic: negative for syncope   Hematologic: negative for bleeding disorder    Physical Exam:  Vitals: /74   Ht 1.803 m (5' 11\")   Wt 88.9 kg (196 lb)   BMI 27.34 kg/m    BMI= Body mass index is 27.34 kg/m .  Constitutional: healthy, alert and no acute distress   Psychiatric: mentation appears normal and affect normal/bright  NEURO: no focal deficits  SKIN: .well healed, no erythema, no incision breakdown and no drainage.  JOINT/EXTREMITIES: right shoulder: no effusion, atrophy, deformity. No focal areas of tenderness. AROM 98/80/30/back pocket. Pain with motion.  PROM able to take the shoulder to about 140/140 and stopped due to pain and starting to hit hard endpoint.  RTC not fully tested due to " pain with motion and guarding. Positive drop arm.   GAIT: not tested     Diagnostic Modalities:  MRI of right shoulder reviewed: prior rotator cuff repair with screw tracks in the humeral head. Full thickness tear of supraspinatus tendon about 2 cm medially from the footprint, retracted to the level of glenohumeral joint continues into the infraspinatus with a near full thickness tear. Full thickness tear of the subscapularis with 3 cm of retraction. Mild atrophy of supraspinatus with min. Fatty infiltration within infraspinatus. Marked atrophy of the upper to mid muscle of the subscapularis. Previous bicep tendonesis. Degenerative changes to the GH joint with subchondral edema and overlying cartilage loss.   Independent visualization of the images was performed.      Impression:   Chief Complaint   Patient presents with     RECHECK     mri results of right shoulder     Surgical Followup     dos: 2/24/2020~Right shoulder arthroscopy with arthroscopic rotator cuff repair (double row supraspinatus and infraspinatus repair, single row subscapularis repair), and labral biceps stump debridement, subacromial decompression with partial acromioplasty, and distal clavicle excision~5 months postop   Re-tear of the rotator cuff with arthropathy.       Plan:   Treatment options discussed.  Unfortunately given his large repeat tear associated with some degenerative changes the glenohumeral joint options are somewhat limited.  Certainly could consider revision arthroscopy/open rotator cuff repair.  I reviewed the limitations of that procedure.  Certainly made no guarantees as far as resolution of pain.  Otherwise, starting to discuss reverse total shoulder replacement.  He is on the younger side and higher activity level.  Would encourage arthroscopy more exhausting conservative care before considering reverse.  Referral for physical therapy to focus on periscap and deltoid muscle    Return to clinic PRN, or sooner as needed for  changes.    Re-x-ray on return: No    Scribed by:  GAYATHRI Vaughan, CNP  10:07 AM  7/6/2020  The information in this document, created by a scribe for me, accurately reflects the services I personally performed and the decisions made by me. I have reviewed and approved this document for accuracy    Hong Edmond, DO

## 2020-07-06 NOTE — LETTER
7/6/2020         RE: Amanuel Mccord  [address]  [facility] [address]        Dear Colleague,    Thank you for referring your patient, Amanuel Mccord, to the [facility]. Please see a copy of my visit note below.    Orthopedic Clinic Post-Operative Note    CHIEF COMPLAINT:   Chief Complaint   Patient presents with     RECHECK     mri results of right shoulder     Surgical Followup     dos: 2/24/2020~Right shoulder arthroscopy with arthroscopic rotator cuff repair (double row supraspinatus and infraspinatus repair, single row subscapularis repair), and labral biceps stump debridement, subacromial decompression with partial acromioplasty, and distal clavicle excision~5 months postop       HISTORY OF PRESENT ILLNESS  Returns to clinic. Overall not much improved since last visit. States the shoulder has some good some bad days.  Is still working on home exercises but stopped outpatient physical therapy.  Overall feels the shoulder is not much better than it was prior to surgery. Has been able to modify activities and work around the house to avoid lifting above shoulder level.     Patient's past medical, surgical, social and family histories reviewed.     Past Medical History:   Diagnosis Date     PONV (postoperative nausea and vomiting)        Past Surgical History:   Procedure Laterality Date     ARTHROSCOPY SHOULDER DECOMPRESSION Right 2/24/2020    Procedure: subacromial decompression with partial acromioplasty;  Surgeon: Hong Edmond DO;  [facility]     ARTHROSCOPY SHOULDER DISTAL CLAVICLE REPAIR Right 2/24/2020    Procedure: distal clavicle excision;  Surgeon: Hong Edmond DO;  [facility]     ARTHROSCOPY SHOULDER ROTATOR CUFF REPAIR Right 2/24/2020    Procedure: right shoulder arthroscopy with rotator cuff repair and labral debridement;  Surgeon: Hong Edmond DO;  Location: [facility]       Medications:  acetaminophen (TYLENOL) 325 MG  "tablet, Take 3 tablets (975 mg) by mouth every 8 hours as needed for mild pain  amLODIPine-benazepril (LOTREL) 5-20 MG capsule, Take 1 capsule by mouth daily  atorvastatin (LIPITOR) 80 MG tablet, Take 1 tablet (80 mg) by mouth every morning Must keep upcoming appointment for refills.  diclofenac (VOLTAREN) 75 MG EC tablet, Take 1 tablet (75 mg) by mouth 2 times daily as needed for moderate pain  glimepiride (AMARYL) 1 MG tablet, Take 1 tablet (1 mg) by mouth daily  JANUMET  MG tablet, TAKE ONE TABLET BY MOUTH TWICE A DAY WITH MEALS  sildenafil (VIAGRA) 100 MG tablet, TAKE ONE TABLET BY MOUTH DAILY AS NEEDED    No current facility-administered medications on file prior to visit.       No Known Allergies    Social History     Occupational History     Not on file   Tobacco Use     Smoking status: Current Every Day Smoker     Types: Cigarettes     Smokeless tobacco: Never Used   Substance and Sexual Activity     Alcohol use: Yes     Drug use: No     Sexual activity: Yes       Family History   Problem Relation Age of Onset     Hypertension Mother      Hyperlipidemia Mother      Hyperlipidemia Father      Hypertension Father      Diabetes Brother      Diabetes Sister        REVIEW OF SYSTEMS  General: negative for, night sweats, dizziness, fatigue  Resp: No shortness of breath and no cough  CV: negative for chest pain, syncope or near-syncope  GI: negative for nausea, vomiting and diarrhea  : negative for dysuria and hematuria  Musculoskeletal: as above  Neurologic: negative for syncope   Hematologic: negative for bleeding disorder    Physical Exam:  Vitals: /74   Ht 1.803 m (5' 11\")   Wt 88.9 kg (196 lb)   BMI 27.34 kg/m    BMI= Body mass index is 27.34 kg/m .  Constitutional: healthy, alert and no acute distress   Psychiatric: mentation appears normal and affect normal/bright  NEURO: no focal deficits  SKIN: .well healed, no erythema, no incision breakdown and no drainage.  JOINT/EXTREMITIES: right " shoulder: no effusion, atrophy, deformity. No focal areas of tenderness. AROM 98/80/30/back pocket. Pain with motion.  PROM able to take the shoulder to about 140/140 and stopped due to pain and starting to hit hard endpoint.  RTC not fully tested due to pain with motion and guarding. Positive drop arm.   GAIT: not tested     Diagnostic Modalities:  MRI of right shoulder reviewed: prior rotator cuff repair with screw tracks in the humeral head. Full thickness tear of supraspinatus tendon about 2 cm medially from the footprint, retracted to the level of glenohumeral joint continues into the infraspinatus with a near full thickness tear. Full thickness tear of the subscapularis with 3 cm of retraction. Mild atrophy of supraspinatus with min. Fatty infiltration within infraspinatus. Marked atrophy of the upper to mid muscle of the subscapularis. Previous bicep tendonesis. Degenerative changes to the GH joint with subchondral edema and overlying cartilage loss.   Independent visualization of the images was performed.      Impression:   Chief Complaint   Patient presents with     RECHECK     mri results of right shoulder     Surgical Followup     dos: 2/24/2020~Right shoulder arthroscopy with arthroscopic rotator cuff repair (double row supraspinatus and infraspinatus repair, single row subscapularis repair), and labral biceps stump debridement, subacromial decompression with partial acromioplasty, and distal clavicle excision~5 months postop   Re-tear of the rotator cuff with arthropathy.       Plan:   Treatment options discussed.  Unfortunately given his large repeat tear associated with some degenerative changes the glenohumeral joint options are somewhat limited.  Certainly could consider revision arthroscopy/open rotator cuff repair.  I reviewed the limitations of that procedure.  Certainly made no guarantees as far as resolution of pain.  Otherwise, starting to discuss reverse total shoulder replacement.  He is on  the younger side and higher activity level.  Would encourage arthroscopy more exhausting conservative care before considering reverse.  Referral for physical therapy to focus on periscap and deltoid muscle    Return to clinic PRN, or sooner as needed for changes.    Re-x-ray on return: No    Scribed by:  GAYATHRI Vaughan, CNP  10:07 AM  7/6/2020  The information in this document, created by a scribe for me, accurately reflects the services I personally performed and the decisions made by me. I have reviewed and approved this document for accuracy    Hong Edmond DO         Again, thank you for allowing me to participate in the care of your patient.        Sincerely,        Hong Edmond DO

## 2020-07-07 ENCOUNTER — TELEPHONE (OUTPATIENT)
Dept: FAMILY MEDICINE | Facility: CLINIC | Age: 60
End: 2020-07-07

## 2020-07-07 NOTE — TELEPHONE ENCOUNTER
----- Message from Gregory G Schoen, MD sent at 7/7/2020  4:18 PM CDT -----  Please let Rommel know that his labs look okay.  A1c was 6.7%, ,urine protein stable and kidney function looks fine.  No changes in meds indicated.   Electronically signed by Greg Schoen, MD

## 2020-07-24 NOTE — TELEPHONE ENCOUNTER
[facility] Referral      1. Confirmed address with patient? Yes  2. Confirmed phone number with patient? Yes  3. Confirmed referring provider? Yes  4. Is the PCP the same as the referring provider? Yes  5. Has the patient been to any previous pain clinics? No  (If yes, send LINDA with welcome letter)  6. Which insurance are we to bill for this appointment?  [employer]    7. Informed pt of cancellation (48 hour) policy? Yes    REGARDING OPIOID MEDICATIONS: We will always address appropriateness of opioid pain medications, but we generally will not automatically take on a prescribing role. When we do take on prescribing of opioids for chronic pain, it is in collaboration with the referring physician for an intermediate period of time (months), with an expectation that the primary physician or provider will assume the prescribing role if medications are effective at stable doses with demonstrated compliance. Therefore, please do not assume that your prescribing responsibilities end on the day of pain clinic consultation.  8. Informed pt of prescribing policy? Yes    9.Please be aware that once you are established with a pain provider and location, you will need to continue have all future visits with that provider and location. It is best to determine what location is the most convenient for you and schedule with that one.    ** PATIENT INFORMED OF THIS POLICY Yes      9. Referring Provider: Schoen, Gregory G     10. Criteria for Triage Eval:   -Missed/Failed 1st appointment? N/A     -Medication Focused? N/A     -Mental Health Concerns? (e.g. Recent psych hospitalization/snap shot)? N/A     -Active substance abuse? N/A     -Patient behaviors (e.g. Offensive language/raised voice)? N/A    Annmarie WINTER    [facility]        Annmarie WINTER    [facility]

## 2020-08-03 ENCOUNTER — VIRTUAL VISIT (OUTPATIENT)
Dept: PALLIATIVE MEDICINE | Facility: CLINIC | Age: 60
End: 2020-08-03
Payer: COMMERCIAL

## 2020-08-03 VITALS — HEIGHT: 71 IN | WEIGHT: 205 LBS | BODY MASS INDEX: 28.7 KG/M2

## 2020-08-03 DIAGNOSIS — G89.4 CHRONIC PAIN SYNDROME: ICD-10-CM

## 2020-08-03 DIAGNOSIS — G89.29 CHRONIC RIGHT SHOULDER PAIN: Primary | ICD-10-CM

## 2020-08-03 DIAGNOSIS — M25.511 CHRONIC RIGHT SHOULDER PAIN: Primary | ICD-10-CM

## 2020-08-03 PROCEDURE — 99204 OFFICE O/P NEW MOD 45 MIN: CPT | Mod: 95 | Performed by: PHYSICIAN ASSISTANT

## 2020-08-03 RX ORDER — DULOXETIN HYDROCHLORIDE 20 MG/1
20 CAPSULE, DELAYED RELEASE ORAL DAILY
Qty: 30 CAPSULE | Refills: 0 | Status: SHIPPED | OUTPATIENT
Start: 2020-08-03 | End: 2020-08-31

## 2020-08-03 ASSESSMENT — MIFFLIN-ST. JEOR: SCORE: 1762

## 2020-08-03 ASSESSMENT — PAIN SCALES - GENERAL: PAINLEVEL: EXTREME PAIN (8)

## 2020-08-03 NOTE — PATIENT INSTRUCTIONS
After Visit Instructions:     Thank you for coming to [facility] for your care. It is my goal to partner with you to help you reach your optimal state of health.     I am recommending multidisciplinary care at this time.  The focus of care will be to continue gradual rehabilitation and pain management with medication adjustments as needed.    Continue daily self-care, identifying contributing factors, and monitoring variations in pain level. Continue to integrate self-care into your life.        Schedule physical therapy assessment/visit: schedule as recommended by Dr. Edmond    Schedule follow-up with Elda Burciaga PA-C in 4 weeks. We will call you to schedule    Medication recommendations:     Try Voltaren Gel-this is over the counter. Follow the package instructions    Start Cymbalta (duloxetine) 20mg daily.       Elda Burciaga PA-C  [facility]    Contact information: [facility]  Clinic Number:  [phone]     Call with any questions about your care and for scheduling assistance.     Calls are returned Monday through Friday between 8 AM and 4:30 PM. We usually get back to you within 2 business days depending on the issue/request.    If we are prescribing your medications:    For opioid medication refills, call the clinic or send a [employer] message 7 days in advance.  Please include:    Name of requested medication    Name of the pharmacy.    For non-opioid medications, call your pharmacy directly to request a refill. Please allow 3-4 days to be processed.     Per [place] State Law:    All controlled substance prescriptions must be filled within 30 days of being written.      For those controlled substances allowing refills, pickup must occur within 30 days of last fill.      We believe regular attendance is key to your success in our program!      Any time you are unable to keep your appointment we ask that you call us at  least 24 hours in advance to cancel.This will allow us to offer the appointment time to another patient.   Multiple missed appointments may lead to dismissal from the clinic.

## 2020-08-03 NOTE — PROGRESS NOTES
"Amanuel Mccord is a 60 year old male who is being evaluated via a billable video visit.      The patient has been notified of following:     \"This video visit will be conducted via a call between you and your physician/provider. We have found that certain health care needs can be provided without the need for an in-person physical exam.  This service lets us provide the care you need with a video conversation.  If a prescription is necessary we can send it directly to your pharmacy.  If lab work is needed we can place an order for that and you can then stop by our lab to have the test done at a later time.    Video visits are billed at different rates depending on your insurance coverage.  Please reach out to your insurance provider with any questions.    If during the course of the call the physician/provider feels a video visit is not appropriate, you will not be charged for this service.\"    Patient has given verbal consent for Video visit? Yes  How would you like to obtain your AVS? Mail a copy  If you are dropped from the video visit, the video invite should be resent to: Text to cell phone: [phone]  Will anyone else be joining your video visit? No        Video-Visit Details    Type of service:  Video Visit    Video Start Time: 8:50am   Video End Time: 9:30am    Originating Location (pt. Location): Home    Distant Location (provider location):  [facility]     Platform used for Video Visit: [facility] Consultation      This patient is being seen in consultation at the request of his provider Dr. Schoen, Gregory.    Primary Care Provider is Schoen, Gregory G.      CHIEF COMPLAINT:  Right shoulder apain    HISTORY OF PRESENT ILLNESS:  Amanuel Mccord is a 60 year old male with history of shoulder pain.    He has a history of surgeries on his shoulder. He retired in January due to his shoulder issues. He states that he has other chronic pain as well. " "He states that he has a bad hip and lower back. He states that he takes beers and a few Aleve to help. He states that he is finding himself with more anxiety.     He states that his pain really started back in his 20's. 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 He states that the in the last 1-2 year his pain has been daily. He states that his pain is some days better and other days it's worse. He states that he is limited with his activity, especially with lifting.   He is working on getting disability as well. He does not want to get on any opioids. He is not sure about physical therapy. This was recommended by orthopedics.    He states that a friend of his and his daughter works at a chiropractor clinic. He used a THC oil in the past (he does not want marijuana to smoke) from a friend and this was helpful. CBD was not helpful.     Pain Information:   Onset/Progression:  Pain started years ago.   Pain quality: Aching    Pain timing: Constant     Pain rating: intensity averages 8/10 on a 0-10 scale.   Aggravating factors include: lifting, anything that is labor intensive    Relieving factors include: decreasing activity      Past Pain Treatments:   Pain Clinic:   No  PT: Yes, will be starting again soon  Psychologist: No  Relaxation techniques/biofeedback: No  Chiropractor: Yes, but hasn't been for awhile  Acupuncture: No  Ultrasound therapy: No  Pharmacotherapy:    Opioids: Yes    Non-opioids:  Yes  TENs Unit:Yes  Injections: years ago in his hip  Surgeries related to pain: Yes, surgery to right shoulder     Current Pain Relevant Medications:    Aleve takes 3 at a time a couple times a day sometimes      Previous Pain Relevant Medications: (H--helped; HI--Helped initially; SWH--Somewhat helpful; NH--No help; W--worse; SE--side effects; ?--Unsure if helpful)   NOTE: This medication information taken from patient's intake form, not medical records.   Opiates: Oxycodone H after surgery  NSAIDS: " Diclofenac NH, Aleve SW, Meloxicam SW, Ibuprofen NH  Anti-migraine mediations: none  Muscle Relaxants: Tizanidine SE hangover effect  Neuropathics: none  Anti-depressants: none  Topicals: CBD NH, THC oil H  Other medications not covered above: Tylenol NH    FAMILY MEDICAL HISTORY:  Chronic pain: No      PAST MEDICAL HISTORY:   Past Medical History:   Diagnosis Date     PONV (postoperative nausea and vomiting)          HEALTH AND LIFESTYLE PRACTICES:  Have you ever had any problems with alcohol or drug use? no    SLEEP:  Do you snore heavily? no  Have you been diagnosed with sleep apnea? no  Do you wear a CPAP? no      ALLERGIES:  No Known Allergies    MEDICATIONS:  Current Outpatient Medications   Medication Sig Dispense Refill     acetaminophen (TYLENOL) 325 MG tablet Take 3 tablets (975 mg) by mouth every 8 hours as needed for mild pain 50 tablet 1     amLODIPine-benazepril (LOTREL) 5-20 MG capsule Take 1 capsule by mouth daily 90 capsule 3     atorvastatin (LIPITOR) 80 MG tablet Take 1 tablet (80 mg) by mouth every morning Must keep upcoming appointment for refills. 90 tablet 3     diclofenac (VOLTAREN) 75 MG EC tablet Take 1 tablet (75 mg) by mouth 2 times daily as needed for moderate pain 30 tablet 1     glimepiride (AMARYL) 1 MG tablet Take 1 tablet (1 mg) by mouth daily 90 tablet 3     JANUMET  MG tablet TAKE ONE TABLET BY MOUTH TWICE A DAY WITH MEALS 180 tablet 3     sildenafil (VIAGRA) 100 MG tablet TAKE ONE TABLET BY MOUTH DAILY AS NEEDED 4 tablet 3         REVIEW OF SYSTEMS:   Constitutional:  Negative  Eyes/Head: Negative  Ears/Nose/Throat: Negative  Allergy/Immune: Negative  Skin:Negative  Hematologic/Lymphatic/Immunologic:Negative  Respiratory: Negative  Cardiovascular: High blood pressure  Gastrointestinal: Negative  Endocrine: Diabetes  Musculoskeletal: Arthritis, Back pain, Joint pain, Neck pain and Stiffness  Urinary:  Negative   Any bowel or bladder incontinence: Denies   Neurologic:  Negative  Psychiatric: Anxiety and Depression    CURRENT FAMILY/SOCIAL SITUATION:  Support system: He reports having a good support system  Occupation: No currently working    SUBSTANCE USE:  Any illicit drug use: has used a friends wife medical cannabis, marijuana in the 70's/80's  EtOH use: A case of beer/week  Nicotine use: smokes 2 packs/week  Any use of prescriptions other than how they were prescribed: no    PHYSICAL EXAM    Vitals: no vitals were taken for this visit    Appearance:     A&O. Patient is appropriate.   Patient is in NAD.   Patient is well groomed and appears stated age.     HEENT:   Normocephalic, atraumatic. Hearing is adequate for exam.  Respiratory: no shortness of breath with normal conversaton  Skin:  No rashes, erythema, breakdowns, lesions to exposed skin.   Hematologic:  No bruises, petechiae or ecchymosis to exposed areas.  Psychiatric:  mentation appears normal., affect and mood normal    Previous Diagnostic Tests:   Imaging Studies:   MRI right shoulder 6/29/2020  IMPRESSION:  1. Small amount of fluid in the right shoulder glenohumeral joint with synovial proliferation. A moderate amount of fluid in the subacromial subdeltoid bursa.  2. Prior rotator cuff repair in the right shoulder with screw tracks  noted in the humeral head.  3. Full-thickness tear of the right shoulder supraspinatus tendon,  located approximately 2 cm from the footprint with a gap between the torn fibers measuring 3 cm, retracted to the level of the glenohumeral joint. This tear continues into the majority of the infraspinatus tendon with residual fibers noted more posteriorly/distally. There is also full-thickness tearing of the superior fibers of the subscapularis tendon with retraction of the torn fibers by approximately 3 cm.  4. Mild atrophy within the right shoulder supraspinatus muscle with minimal fatty infiltration within the infraspinatus muscle. Marked atrophy within the upper to mid muscle bulk of the  right shoulder subscapularis muscle.  5. Tearing of the biceps tendon versus prior biceps tenodesis,  correlate with operative history.  6. Degenerative osteoarthrosis in the right shoulder glenohumeral joint with subchondral edema and overlying cartilage loss, greatest superiorly.    Other Testing (labs, diagnostics) reviewed:   Labs:CMP from 7/6/202 was within normal limits, Hgb A1C 6.7    [place] [facility]  Pharmacy Data Base Reviewed:    YES; No concern for abuse or misuse of controlled medications based on this report.     ASSESSMENT:   Amanuel Mccord is a 60 year old male who presents today with the complaints of:    1. Chronic right shoulder pain  2. Chronic pain syndrome  3. Myofascial pain      We discussed a multidisciplinary approach to pain management today.  This included physical therapy, behavioral health, medication management, and injection therapy.     Patient reports multiple areas of pain. The worst being his right shoulder. We did discuss medical cannabis, however at this time, I do not believe he qualifies. He has multiple other options to try before I would classify him as intractable with regards to his pain.     We discussed multiple medications including gabapentin and Cymbalta. With all of his pain issues as well as his concern for his increase in anxiety, I do think Cymbalta would be the most appropriate choice. Side effects discussed. I also recommend that he try OTC voltaren gel.      PLAN:    Diagnosis reviewed, treatment option addressed, and risk/benefits discussed.  Self-care instructions given.  I am recommending a multidisciplinary treatment plan to help this patient better manage his pain.       1. Physical Therapy:  Schedule as recommended by Dr. Edmond  2. Clinical Health Psychologist to address issues of relaxation, behavorial change, coping style, and other factors important to improvement: not at this time  3. Diagnostic Studies:  None at this time  4. Medication  Management:     Try Voltaren Gel-this is over the counter. Follow the package instructions  1. Start Cymbalta (duloxetine) 20mg daily.   5. Potential procedures: none at this time  6. Other treatments: consider acupuncture and ultrasound therapy   7. Recommendations to PCP: see above    Follow up: 4 Weeks     I would like to thank Dr. Schoen, Gregory G for allowing me to participate in the management of this patient.     Elda Burciaga PA-C  [facility]

## 2020-08-31 ENCOUNTER — VIRTUAL VISIT (OUTPATIENT)
Dept: PALLIATIVE MEDICINE | Facility: CLINIC | Age: 60
End: 2020-08-31
Payer: COMMERCIAL

## 2020-08-31 VITALS — BODY MASS INDEX: 28.7 KG/M2 | WEIGHT: 205 LBS | HEIGHT: 71 IN

## 2020-08-31 DIAGNOSIS — G89.29 CHRONIC RIGHT SHOULDER PAIN: Primary | ICD-10-CM

## 2020-08-31 DIAGNOSIS — M25.511 CHRONIC RIGHT SHOULDER PAIN: Primary | ICD-10-CM

## 2020-08-31 DIAGNOSIS — G89.4 CHRONIC PAIN SYNDROME: ICD-10-CM

## 2020-08-31 DIAGNOSIS — M79.18 MYOFASCIAL PAIN: ICD-10-CM

## 2020-08-31 PROCEDURE — 99213 OFFICE O/P EST LOW 20 MIN: CPT | Mod: 95 | Performed by: PHYSICIAN ASSISTANT

## 2020-08-31 RX ORDER — DULOXETIN HYDROCHLORIDE 30 MG/1
30 CAPSULE, DELAYED RELEASE ORAL DAILY
Qty: 30 CAPSULE | Refills: 0 | Status: SHIPPED | OUTPATIENT
Start: 2020-08-31 | End: 2020-09-28

## 2020-08-31 RX ORDER — METHOCARBAMOL 500 MG/1
500 TABLET, FILM COATED ORAL 3 TIMES DAILY PRN
Qty: 20 TABLET | Refills: 0 | Status: SHIPPED | OUTPATIENT
Start: 2020-08-31

## 2020-08-31 ASSESSMENT — PAIN SCALES - GENERAL: PAINLEVEL: MODERATE PAIN (5)

## 2020-08-31 ASSESSMENT — MIFFLIN-ST. JEOR: SCORE: 1762

## 2020-08-31 NOTE — PROGRESS NOTES
"Amanuel Mccord is a 60 year old male who is being evaluated via a billable video visit.      The patient has been notified of following:     \"This video visit will be conducted via a call between you and your physician/provider. We have found that certain health care needs can be provided without the need for an in-person physical exam.  This service lets us provide the care you need with a video conversation.  If a prescription is necessary we can send it directly to your pharmacy.  If lab work is needed we can place an order for that and you can then stop by our lab to have the test done at a later time.    Video visits are billed at different rates depending on your insurance coverage.  Please reach out to your insurance provider with any questions.    If during the course of the call the physician/provider feels a video visit is not appropriate, you will not be charged for this service.\"    Patient has given verbal consent for Video visit? Yes  How would you like to obtain your AVS? Mail a copy  If you are dropped from the video visit, the video invite should be resent to: Text to cell phone: [phone]  Will anyone else be joining your video visit? No    Video-Visit Details    Type of service:  Video Visit    Video Start Time: 9:55am  Video End Time: 10:02am    Originating Location (pt. Location): Home    Distant Location (provider location):  [facility]     Platform [facility] for [facility]    [facility]    CHIEF COMPLAINT:   Right shoulder apain    INTERVAL HISTORY:  Last seen on 08/03/2020.        Recommendations/plan at the last visit included:  1. Physical Therapy:  Schedule as recommended by Dr. Edmond  2. Clinical Health Psychologist to address issues of relaxation, behavorial change, coping style, and other factors important to improvement: not at this time  3. Diagnostic Studies:  None at this time  4. Medication Management:   1. Try Voltaren " "Gel-this is over the counter. Follow the package instructions  2. Start Cymbalta (duloxetine) 20mg daily.   5. Potential procedures: none at this time  6. Other treatments: consider acupuncture and ultrasound therapy   7. Recommendations to PCP: see above     Follow up: 4 Weeks     Since his last visit, Amanuel Mccord reports:  - He states that he has been pretty good. He did feel the medication \"settled\" him down a little bit. He does feel it is helping, but the pain is still there. He did not try the Voltaren gel. He states that he is having muscle pain between his shoulder blades.       Pain Information:      Pain today 5/10      CURRENT RELEVANT PAIN MEDICATIONS:   Aleve takes 3 at a time a couple times a day sometimes  Cymbalta 20mg daily    Patient is using the medication as prescribed:  YES  Is your medication helpful? somewhat  Medication side effects? no side effect    Previous Medications: (H--helped; HI--Helped initially; SWH-- somewhat helpful, NH--No help; W--worse; SE--side effects)   Opiates: Oxycodone H after surgery  NSAIDS: Diclofenac NH, Aleve SWH, Meloxicam SWH, Ibuprofen NH  Anti-migraine mediations: none  Muscle Relaxants: Tizanidine SE hangover effect  Neuropathics: none  Anti-depressants: none  Topicals: CBD NH, THC oil H  Other medications not covered above: Tylenol NH    Past Pain Treatments:  Pain Clinic:   No  PT: Yes, will be starting again soon  Psychologist: No  Relaxation techniques/biofeedback: No  Chiropractor: Yes, but hasn't been for awhile  Acupuncture: No  Ultrasound therapy: No  Pharmacotherapy:               Opioids: Yes               Non-opioids:    Yes  TENs Unit:Yes  Injections: years ago in his hip  Surgeries related to pain: Yes, surgery to right shoulder     [place] Board of Pharmacy Data Base Reviewed:    NO; As expected, no concern for misuse/abuse of controlled medications based on this report.          Medications:  Current Outpatient Medications   Medication Sig " Dispense Refill     acetaminophen (TYLENOL) 325 MG tablet Take 3 tablets (975 mg) by mouth every 8 hours as needed for mild pain 50 tablet 1     amLODIPine-benazepril (LOTREL) 5-20 MG capsule Take 1 capsule by mouth daily 90 capsule 3     atorvastatin (LIPITOR) 80 MG tablet Take 1 tablet (80 mg) by mouth every morning Must keep upcoming appointment for refills. 90 tablet 3     diclofenac (VOLTAREN) 75 MG EC tablet Take 1 tablet (75 mg) by mouth 2 times daily as needed for moderate pain 30 tablet 1     DULoxetine (CYMBALTA) 20 MG capsule Take 1 capsule (20 mg) by mouth daily 30 capsule 0     glimepiride (AMARYL) 1 MG tablet Take 1 tablet (1 mg) by mouth daily 90 tablet 3     JANUMET  MG tablet TAKE ONE TABLET BY MOUTH TWICE A DAY WITH MEALS 180 tablet 3     sildenafil (VIAGRA) 100 MG tablet TAKE ONE TABLET BY MOUTH DAILY AS NEEDED 4 tablet 3       PHYSICAL EXAM:   Constitutional: healthy, alert and no distress  HEENT: Head atraumatic, normocephalic. Eyes without conjunctival injection or jaundice. Neck supple. No obvious neck masses.  Psychiatric/mental status: Alert, without lethargy or stupor. Appropriate affect. Mood normal.     DIAGNOSTIC TESTS:  Imaging Studies:   No new imaging to review    Assessment:  Amanuel Mccord is a 60 year old male who presents today for follow up regarding his:    1. Chronic right shoulder pain  2. Chronic pain syndrome  3. Myofascial pain    Doing better than one month ago. Will do a slight increase of his Cymbalta. Will also try a different muscle relaxant to see if that helps the muscle pain in his back.     Plan:    Diagnosis reviewed, treatment option addressed, and risk/benifits discussed.  Self-care instructions given.  I am recommending a multidisciplinary treatment plan to help this patient better manage pain.      1. Physical Therapy:  NO   2. Clinical Health Psychologist:  NO    3. Diagnostic Studies:  None at this time  4. Medication Management:    1. Increase  Cymbalta to 30mg daily  2. Start Robaxin 500m tablet three times daily as needed. Do not use with Tizanidine.   5. Further procedures recommended: none at this time  6. Recommendations to PCP. See above      Follow up with this provider:  4 Weeks     Elda Burciaga PA-C   [facility]

## 2020-08-31 NOTE — PATIENT INSTRUCTIONS
After Visit Instructions:     Thank you for coming to [facility] for your care. It is my goal to partner with you to help you reach your optimal state of health.     I am recommending multidisciplinary care at this time.  The focus of care will be to continue gradual rehabilitation and pain management with medication adjustments as needed.    Continue daily self-care, identifying contributing factors, and monitoring variations in pain level. Continue to integrate self-care into your life.          Schedule follow-up with Elda Burciaga PA-C in 4 weeks.    Medication recommendations:     Increase Cymbalta to 30mg daily    Stop Tizanidine. Try Robaxin 500m tablet three times daily as needed      lEda Burciaga PA-C  [facility]    Contact information: [facility]  Clinic Number:  [phone]     Call with any questions about your care and for scheduling assistance.     Calls are returned Monday through Friday between 8 AM and 4:30 PM. We usually get back to you within 2 business days depending on the issue/request.    If we are prescribing your medications:    For opioid medication refills, call the clinic or send a [employer] message 7 days in advance.  Please include:    Name of requested medication    Name of the pharmacy.    For non-opioid medications, call your pharmacy directly to request a refill. Please allow 3-4 days to be processed.     Per [place] State Law:    All controlled substance prescriptions must be filled within 30 days of being written.      For those controlled substances allowing refills, pickup must occur within 30 days of last fill.      We believe regular attendance is key to your success in our program!      Any time you are unable to keep your appointment we ask that you call us at least 24 hours in advance to cancel.This will allow us to offer the appointment time to another patient.   Multiple missed  appointments may lead to dismissal from the clinic.

## 2020-09-28 ENCOUNTER — TELEPHONE (OUTPATIENT)
Dept: PALLIATIVE MEDICINE | Facility: CLINIC | Age: 60
End: 2020-09-28

## 2020-09-28 ENCOUNTER — VIRTUAL VISIT (OUTPATIENT)
Dept: PALLIATIVE MEDICINE | Facility: CLINIC | Age: 60
End: 2020-09-28
Payer: COMMERCIAL

## 2020-09-28 VITALS — WEIGHT: 205 LBS | BODY MASS INDEX: 28.59 KG/M2

## 2020-09-28 DIAGNOSIS — M25.511 CHRONIC RIGHT SHOULDER PAIN: ICD-10-CM

## 2020-09-28 DIAGNOSIS — G89.4 CHRONIC PAIN SYNDROME: ICD-10-CM

## 2020-09-28 DIAGNOSIS — G89.29 CHRONIC RIGHT SHOULDER PAIN: ICD-10-CM

## 2020-09-28 PROCEDURE — 99213 OFFICE O/P EST LOW 20 MIN: CPT | Mod: 95 | Performed by: PHYSICIAN ASSISTANT

## 2020-09-28 RX ORDER — DULOXETINE 40 MG/1
30 CAPSULE, DELAYED RELEASE ORAL DAILY
Qty: 30 CAPSULE | Refills: 0 | Status: SHIPPED | OUTPATIENT
Start: 2020-09-28 | End: 2021-10-08

## 2020-09-28 ASSESSMENT — PAIN SCALES - GENERAL: PAINLEVEL: SEVERE PAIN (7)

## 2020-09-28 NOTE — PROGRESS NOTES
"Amanuel Mccord is a 60 year old male who is being evaluated via a billable video visit.      The patient has been notified of following:     \"This video visit will be conducted via a call between you and your physician/provider. We have found that certain health care needs can be provided without the need for an in-person physical exam.  This service lets us provide the care you need with a video conversation.  If a prescription is necessary we can send it directly to your pharmacy.  If lab work is needed we can place an order for that and you can then stop by our lab to have the test done at a later time.    Video visits are billed at different rates depending on your insurance coverage.  Please reach out to your insurance provider with any questions.    If during the course of the call the physician/provider feels a video visit is not appropriate, you will not be charged for this service.\"    Patient has given verbal consent for Video visit? Yes  How would you like to obtain your AVS? Mail a copy  If you are dropped from the video visit, the video invite should be resent to: Text to cell phone: [phone]  Will anyone else be joining your video visit? No    Video-Visit Details    Type of service:  Video Visit    Video Start Time: 9:54am  Video End Time: 10:01am    Originating Location (pt. Location): Home    Distant Location (provider location):  [facility]     Platform used for Video Visit: [facility]    CHIEF COMPLAINT:   Right shoulder apain    INTERVAL HISTORY:  Last seen on 2020.        Recommendations/plan at the last visit included:  1. Physical Therapy:  NO   2. Clinical Health Psychologist:  NO    3. Diagnostic Studies:  None at this time  4. Medication Management:    1. Increase Cymbalta to 30mg daily  2. Start Robaxin 500m tablet three times daily as needed. Do not use with Tizanidine.   5. Further procedures recommended: none at " this time  6. Recommendations to PCP. See above        Follow up with this provider:  4 Weeks     Since his last visit, Amanuel Mccord reports:  -He states that things are good and bad. He states that he thinks he over did over the weekend with yard work, so he is trying to manage it. He is tolerating the increased dose of Cymbalta. He did not notice a change. He does feel the Robaxin is helpful. He uses them when he needs them. He uses Aleve.     Pain Information:      Pain today 7/10      CURRENT RELEVANT PAIN MEDICATIONS:   Aleve takes 3 at a time a couple times a day sometimes  Cymbalta 30mg daily  Robaxin 500m tab 2 times daily as needed-does not use daily    Patient is using the medication as prescribed:  YES  Is your medication helpful? somewhat  Medication side effects? no side effect    Previous Medications: (H--helped; HI--Helped initially; SWH-- somewhat helpful, NH--No help; W--worse; SE--side effects)   Opiates: Oxycodone H after surgery  NSAIDS: Diclofenac NH, Aleve SWH, Meloxicam SWH, Ibuprofen NH  Anti-migraine mediations: none  Muscle Relaxants: Tizanidine SE hangover effect  Neuropathics: none  Anti-depressants: none  Topicals: CBD NH, THC oil H  Other medications not covered above: Tylenol NH    Past Pain Treatments:  Pain Clinic:   No  PT: Yes, will be starting again soon  Psychologist: No  Relaxation techniques/biofeedback: No  Chiropractor: Yes, but hasn't been for awhile  Acupuncture: No  Ultrasound therapy: No  Pharmacotherapy:               Opioids: Yes               Non-opioids:    Yes  TENs Unit:Yes  Injections: years ago in his hip  Surgeries related to pain: Yes, surgery to right shoulder     [place] Board of Pharmacy Data Base Reviewed:    NO; (If yes, As expected, no concern for misuse/abuse of controlled medications based on this report.)          Medications:  Current Outpatient Medications   Medication Sig Dispense Refill     acetaminophen (TYLENOL) 325 MG tablet Take 3  tablets (975 mg) by mouth every 8 hours as needed for mild pain 50 tablet 1     amLODIPine-benazepril (LOTREL) 5-20 MG capsule Take 1 capsule by mouth daily 90 capsule 3     atorvastatin (LIPITOR) 80 MG tablet Take 1 tablet (80 mg) by mouth every morning Must keep upcoming appointment for refills. 90 tablet 3     diclofenac (VOLTAREN) 75 MG EC tablet Take 1 tablet (75 mg) by mouth 2 times daily as needed for moderate pain 30 tablet 1     DULoxetine (CYMBALTA) 30 MG capsule Take 1 capsule (30 mg) by mouth daily 30 capsule 0     glimepiride (AMARYL) 1 MG tablet Take 1 tablet (1 mg) by mouth daily 90 tablet 3     JANUMET  MG tablet TAKE ONE TABLET BY MOUTH TWICE A DAY WITH MEALS 180 tablet 3     methocarbamol (ROBAXIN) 500 MG tablet Take 1 tablet (500 mg) by mouth 3 times daily as needed for muscle spasms 20 tablet 0     sildenafil (VIAGRA) 100 MG tablet TAKE ONE TABLET BY MOUTH DAILY AS NEEDED 4 tablet 3       PHYSICAL EXAM:   Constitutional: healthy, alert and no distress  HEENT: Head atraumatic, normocephalic. Eyes without conjunctival injection or jaundice. Neck supple. No obvious neck masses.  Psychiatric/mental status: Alert, without lethargy or stupor. Appropriate affect. Mood normal.     DIAGNOSTIC TESTS:  Imaging Studies:   No new imaging to review    Assessment:  Amanuel Mccord is a 60 year old male who presents today for follow up regarding his:    1. Chronic right shoulder pain  2. Chronic pain syndrome  3. Myofascial pain    No significant change from last month. Will try another increase in Cymbalta. Okay to continue methocarbamol. He is tolerating it better.     Plan:    Diagnosis reviewed, treatment option addressed, and risk/benifits discussed.  Self-care instructions given.  I am recommending a multidisciplinary treatment plan to help this patient better manage pain.      1. Physical Therapy:  NO   2. Clinical Health Psychologist:  NO    3. Diagnostic Studies:  None at this  time  4. Medication Management:    1. Increase Cymbalta to 40mg daily  2. Continue Robaxin 500m-1.5 tablet three times daily as needed.   5. Further procedures recommended: none at this time  6. Recommendations to PCP. See above      Follow up with this provider:  4 Weeks     Elda Burciaga PA-C   [facility]

## 2020-09-28 NOTE — TELEPHONE ENCOUNTER
PERLA to schedule patient for virtual follow up visit in 4 weeks.     Annmarie WINTER    [facility]

## 2020-09-28 NOTE — PATIENT INSTRUCTIONS
After Visit Instructions:     Thank you for coming to [facility] for your care. It is my goal to partner with you to help you reach your optimal state of health.     I am recommending multidisciplinary care at this time.  The focus of care will be to continue gradual rehabilitation and pain management with medication adjustments as needed.    Continue daily self-care, identifying contributing factors, and monitoring variations in pain level. Continue to integrate self-care into your life.        Schedule follow-up with Elda Burciaga PA-C in 4 weeks.     Medication recommendations:    Increase Cymbalta to 40mg daily    Continue Methocarbamol 500m-1.5 tablets three times daily as needed.       Elda Burciaga PA-C  [facility]    Contact information: [facility]  Clinic Number:  [phone]     Call with any questions about your care and for scheduling assistance.     Calls are returned Monday through Friday between 8 AM and 4:30 PM. We usually get back to you within 2 business days depending on the issue/request.    If we are prescribing your medications:    For opioid medication refills, call the clinic or send a [employer] message 7 days in advance.  Please include:    Name of requested medication    Name of the pharmacy.    For non-opioid medications, call your pharmacy directly to request a refill. Please allow 3-4 days to be processed.     Per [place] State Law:    All controlled substance prescriptions must be filled within 30 days of being written.      For those controlled substances allowing refills, pickup must occur within 30 days of last fill.      We believe regular attendance is key to your success in our program!      Any time you are unable to keep your appointment we ask that you call us at least 24 hours in advance to cancel.This will allow us to offer the appointment time to another patient.   Multiple missed  appointments may lead to dismissal from the clinic.

## 2020-10-27 NOTE — PROGRESS NOTES
Outpatient Physical Therapy Discharge Note     Patient: Amanuel Mccord  : 1960    Beginning/End Dates of Reporting Period:  4/15/20 to 20    Referring Provider:     Therapy Diagnosis: Complete tear of the right rotator cuff.      Client Self Report: Ex's going ok, pain fluctuates avg is #3, can get to a #9 if really busy. Patient was having difficulties so returned to the physician and provider said no further PT at this time.     Objective Measurements:  Objective Measure: SPADI     Objective Measure: right shoulder AAROM using clasp hand or cane inn supine  Details: FD=659, at 70 degrees of abd ER=75, abd=100       Goals:  Goal Identifier 1   Goal Description Patient is able to reach with full AROM over head, good form and no hiking of the shoulder. This is while reaching into a top cupboard.    Target Date 20   Date Met      Progress:     Goal Identifier 2   Goal Description Patient has full strength of the right shoulder in all ranges, for full functional ability    Target Date 20   Date Met      Progress:     Goal Identifier 3   Goal Description Patient is able to return to work with no restrictions and for mobility of the right shoulder   Target Date 20   Date Met      Progress:       Progress Toward Goals:   Not assessed this period.          Plan:  Discharge from therapy.    Discharge:    Reason for Discharge: Per provider no further PT        Discharge Plan: Patient to continue home program.    Thank you for the referral,            Cherelle Gonzales PT

## 2021-01-22 ENCOUNTER — TRANSFERRED RECORDS (OUTPATIENT)
Dept: HEALTH INFORMATION MANAGEMENT | Facility: CLINIC | Age: 61
End: 2021-01-22

## 2021-01-22 LAB — RETINOPATHY: NEGATIVE

## 2021-06-02 DIAGNOSIS — E11.40 TYPE 2 DIABETES MELLITUS WITH DIABETIC NEUROPATHY, WITHOUT LONG-TERM CURRENT USE OF INSULIN (H): ICD-10-CM

## 2021-06-02 RX ORDER — SITAGLIPTIN AND METFORMIN HYDROCHLORIDE 1000; 50 MG/1; MG/1
TABLET, FILM COATED ORAL
Qty: 180 TABLET | Refills: 3 | Status: SHIPPED | OUTPATIENT
Start: 2021-06-02 | End: 2022-06-15

## 2021-06-02 NOTE — TELEPHONE ENCOUNTER
Routing refill request to provider for review/approval because:  Labs not current:  A1C    Mary Ellen Greene RN

## 2021-09-28 DIAGNOSIS — E11.40 TYPE 2 DIABETES MELLITUS WITH DIABETIC NEUROPATHY, WITHOUT LONG-TERM CURRENT USE OF INSULIN (H): ICD-10-CM

## 2021-09-30 RX ORDER — GLIMEPIRIDE 1 MG/1
TABLET ORAL
Qty: 90 TABLET | Refills: 3 | Status: SHIPPED | OUTPATIENT
Start: 2021-09-30 | End: 2022-09-23

## 2021-09-30 RX ORDER — ATORVASTATIN CALCIUM 80 MG/1
TABLET, FILM COATED ORAL
Qty: 90 TABLET | Refills: 3 | Status: SHIPPED | OUTPATIENT
Start: 2021-09-30 | End: 2022-09-23

## 2021-09-30 RX ORDER — AMLODIPINE AND BENAZEPRIL HYDROCHLORIDE 5; 20 MG/1; MG/1
CAPSULE ORAL
Qty: 90 CAPSULE | Refills: 3 | Status: SHIPPED | OUTPATIENT
Start: 2021-09-30 | End: 2022-09-23

## 2021-09-30 NOTE — TELEPHONE ENCOUNTER
Lipitor  Routing refill request to provider for review/approval because:  Labs not current:  LDL    Lotrel  Routing refill request to provider for review/approval because:  No current BP on file    Glimepiride  Routing refill request to provider for review/approval because:  Labs not current:  A1C    Mary Ellen Greene RN

## 2021-10-08 ENCOUNTER — OFFICE VISIT (OUTPATIENT)
Dept: FAMILY MEDICINE | Facility: CLINIC | Age: 61
End: 2021-10-08
Payer: COMMERCIAL

## 2021-10-08 VITALS
DIASTOLIC BLOOD PRESSURE: 78 MMHG | TEMPERATURE: 96.8 F | BODY MASS INDEX: 25.31 KG/M2 | SYSTOLIC BLOOD PRESSURE: 128 MMHG | OXYGEN SATURATION: 95 % | HEIGHT: 73 IN | HEART RATE: 91 BPM | RESPIRATION RATE: 18 BRPM | WEIGHT: 191 LBS

## 2021-10-08 DIAGNOSIS — E78.5 HYPERLIPIDEMIA LDL GOAL <100: ICD-10-CM

## 2021-10-08 DIAGNOSIS — E11.40 TYPE 2 DIABETES MELLITUS WITH DIABETIC NEUROPATHY, WITHOUT LONG-TERM CURRENT USE OF INSULIN (H): Primary | ICD-10-CM

## 2021-10-08 DIAGNOSIS — I10 BENIGN ESSENTIAL HYPERTENSION: ICD-10-CM

## 2021-10-08 PROCEDURE — 99207 PR FOOT EXAM NO CHARGE: CPT | Performed by: FAMILY MEDICINE

## 2021-10-08 PROCEDURE — 99214 OFFICE O/P EST MOD 30 MIN: CPT | Performed by: FAMILY MEDICINE

## 2021-10-08 ASSESSMENT — MIFFLIN-ST. JEOR: SCORE: 1720.48

## 2021-10-08 ASSESSMENT — PAIN SCALES - GENERAL: PAINLEVEL: NO PAIN (0)

## 2021-10-08 NOTE — PROGRESS NOTES
"    Assessment & Plan     Type 2 diabetes mellitus with diabetic neuropathy, without long-term current use of insulin (H)  Patient reports that his diabetes is doing well.  He is experienced no symptoms of neuropathy nor does he have any issues with hypoglycemia.  He does report sugars over 200 but says these are infrequent.  He will return in a fasting state next week for comprehensive diabetes blood work.  He unfortunately continues to smoke a few cigarettes a day without intention of quitting and thus will not meet diabetes goals even though he is hitting all other parameters.      Hyperlipidemia LDL goal <100  Currently on statin therapy without side effects.  We will check lipid panel and ALT.    Benign essential hypertension  Well controlled on current meds. No changes indicated. Will check basic profile and urine albumin.    Numerous health maintenance care gaps were reviewed and patient is declining all vaccinations at this time.  Specifically will not give consideration to Covid no influenza vaccines.  He is deferring on tetanus booster.  He also is deferring on colon cancer screening.     Tobacco Cessation:   reports that he has been smoking cigarettes. He has been smoking about 0.25 packs per day. He has never used smokeless tobacco.  Tobacco Cessation Action Plan: Information offered: Patient not interested at this time    BMI:   Estimated body mass index is 25.41 kg/m  as calculated from the following:    Height as of this encounter: 1.847 m (6' 0.7\").    Weight as of this encounter: 86.6 kg (191 lb).           Return in about 6 months (around 4/8/2022) for in person.    Gregory G. Schoen, MD  [facility]    Elyssa Carney is a 61 year old who presents for the following health issues     HPI     Rommel is here today for follow-up of his medications particularly around diabetes and hypertension management.  As usual and speech is rapid and his demeanor is somewhat anxious but this " "has always been how he has presented.  He reports no side effects or issues from his medication.  He reports taking his medications as prescribed.  He discusses that his right shoulder surgery did not result in outcome as desired which puts more stress on his left shoulder as he has to do more with the side but he is okay just dealing with his current condition and not looking to do anything further at this time.    Diabetes Follow-up    How often are you checking your blood sugar? A few times a week  What time of day are you checking your blood sugars (select all that apply)?  Before meals  Have you had any blood sugars above 200?  Yes   Have you had any blood sugars below 70?  No    What symptoms do you notice when your blood sugar is low?  None    What concerns do you have today about your diabetes? None     Do you have any of these symptoms? (Select all that apply)  No numbness or tingling in feet.  No redness, sores or blisters on feet.  No complaints of excessive thirst.  No reports of blurry vision.  No significant changes to weight.      BP Readings from Last 2 Encounters:   07/06/20 131/74   05/27/20 100/60     Hemoglobin A1C (%)   Date Value   07/06/2020 6.7 (H)   12/20/2019 6.7 (H)     LDL Cholesterol Calculated (mg/dL)   Date Value   12/20/2019 73   07/03/2018 63               Hypertension Follow-up      Do you check your blood pressure regularly outside of the clinic? No     Are you following a low salt diet? Yes    Are your blood pressures ever more than 140 on the top number (systolic) OR more   than 90 on the bottom number (diastolic), for example 140/90?             Review of Systems   Constitutional, HEENT, cardiovascular, pulmonary, GI, , musculoskeletal, neuro, skin, endocrine and psych systems are negative, except as otherwise noted.      Objective    /78   Pulse 91   Temp 96.8  F (36  C) (Temporal)   Resp 18   Ht 1.847 m (6' 0.7\")   Wt 86.6 kg (191 lb)   SpO2 95%   BMI 25.41 " kg/m    There is no height or weight on file to calculate BMI.  Physical Exam   GENERAL: healthy, alert and no distress  EYES: Eyes grossly normal to inspection, does have some exophoria of the left eye, PERRL and conjunctivae and sclerae normal; funduscopic exam shows normal disc margins and no vascular abnormalities.  Formal eye exam is recommended due to his diabetes status.  HENT: ear canals and TM's normal on the right and occluded with wax on the left., nose and mouth without ulcers or lesions.  I did have medical assistant irrigate out his left ear which she stated appeared to be quite clear after removal of a large chunk of wax.  It was my intention to go reassess the ear after removal but the patient was unwilling to wait.  NECK: no adenopathy, no asymmetry, masses, or scars and thyroid normal to palpation  RESP: lungs clear to auscultation - no rales, rhonchi or wheezes  CV: regular rate and rhythm, normal S1 S2, no S3 or S4, no murmur, click or rub, no peripheral edema and peripheral pulses strong  ABDOMEN: soft, nontender, no hepatosplenomegaly, no masses and bowel sounds normal  MS: no gross musculoskeletal defects noted, no edema  SKIN: no suspicious lesions or rashes  NEURO: Normal strength and tone, mentation intact and speech normal  PSYCH: Baseline level of high-energy noted but otherwise stable.  Foot exam: no skin lesions, normal pulses and capillary refill, normal monofilament exam.

## 2021-10-12 ENCOUNTER — LAB (OUTPATIENT)
Dept: LAB | Facility: CLINIC | Age: 61
End: 2021-10-12
Payer: COMMERCIAL

## 2021-10-12 DIAGNOSIS — E11.40 TYPE 2 DIABETES MELLITUS WITH DIABETIC NEUROPATHY, WITHOUT LONG-TERM CURRENT USE OF INSULIN (H): ICD-10-CM

## 2021-10-12 DIAGNOSIS — E78.5 HYPERLIPIDEMIA LDL GOAL <100: ICD-10-CM

## 2021-10-12 LAB
ALT SERPL W P-5'-P-CCNC: 38 U/L (ref 0–70)
ANION GAP SERPL CALCULATED.3IONS-SCNC: 1 MMOL/L (ref 3–14)
BUN SERPL-MCNC: 14 MG/DL (ref 7–30)
CALCIUM SERPL-MCNC: 9.2 MG/DL (ref 8.5–10.1)
CHLORIDE BLD-SCNC: 107 MMOL/L (ref 94–109)
CHOLEST SERPL-MCNC: 144 MG/DL
CO2 SERPL-SCNC: 30 MMOL/L (ref 20–32)
CREAT SERPL-MCNC: 0.8 MG/DL (ref 0.66–1.25)
CREAT UR-MCNC: 80 MG/DL
FASTING STATUS PATIENT QL REPORTED: YES
GFR SERPL CREATININE-BSD FRML MDRD: >90 ML/MIN/1.73M2
GLUCOSE BLD-MCNC: 126 MG/DL (ref 70–99)
HBA1C MFR BLD: 6.3 % (ref 0–5.6)
HDLC SERPL-MCNC: 74 MG/DL
LDLC SERPL CALC-MCNC: 53 MG/DL
MICROALBUMIN UR-MCNC: 41 MG/L
MICROALBUMIN/CREAT UR: 51.25 MG/G CR (ref 0–17)
NONHDLC SERPL-MCNC: 70 MG/DL
POTASSIUM BLD-SCNC: 4.3 MMOL/L (ref 3.4–5.3)
SODIUM SERPL-SCNC: 138 MMOL/L (ref 133–144)
TRIGL SERPL-MCNC: 87 MG/DL
TSH SERPL DL<=0.005 MIU/L-ACNC: 1.27 MU/L (ref 0.4–4)

## 2021-10-12 PROCEDURE — 84443 ASSAY THYROID STIM HORMONE: CPT

## 2021-10-12 PROCEDURE — 82043 UR ALBUMIN QUANTITATIVE: CPT

## 2021-10-12 PROCEDURE — 80061 LIPID PANEL: CPT

## 2021-10-12 PROCEDURE — 80048 BASIC METABOLIC PNL TOTAL CA: CPT

## 2021-10-12 PROCEDURE — 84460 ALANINE AMINO (ALT) (SGPT): CPT

## 2021-10-12 PROCEDURE — 36415 COLL VENOUS BLD VENIPUNCTURE: CPT

## 2021-10-12 PROCEDURE — 83036 HEMOGLOBIN GLYCOSYLATED A1C: CPT

## 2021-10-14 ENCOUNTER — TELEPHONE (OUTPATIENT)
Dept: FAMILY MEDICINE | Facility: CLINIC | Age: 61
End: 2021-10-14

## 2021-10-14 NOTE — TELEPHONE ENCOUNTER
----- Message from Gregory G Schoen, MD sent at 10/14/2021 11:43 AM CDT -----  Please inform Rommel that his labs look fine.  He has a little protein in his urine which is not new and this is being treated with Lotrel.  His A1c is excellent at 6.3 so blood sugar management is very good.  Cholesterol also is very good and should remain on his atorvastatin.  No changes in any medications.  As discussed, should come in for well exam in the next 4-6 months.   Electronically signed by Greg Schoen, MD

## 2021-11-10 ENCOUNTER — TRANSFERRED RECORDS (OUTPATIENT)
Dept: HEALTH INFORMATION MANAGEMENT | Facility: CLINIC | Age: 61
End: 2021-11-10
Payer: COMMERCIAL

## 2021-11-10 LAB — RETINOPATHY: NORMAL

## 2022-06-14 DIAGNOSIS — E11.40 TYPE 2 DIABETES MELLITUS WITH DIABETIC NEUROPATHY, WITHOUT LONG-TERM CURRENT USE OF INSULIN (H): ICD-10-CM

## 2022-06-14 NOTE — LETTER
[address] [address]  Phone: [phone]  Fax: [phone]        Alena 15, 2022      Amanuel Mccord                                                                                                                                [id number] [address] [place] [id number]            Dear Mr. Mccord,    We are concerned about your health care.  We recently provided you with a medication refill.  Many medications require routine follow-up with your Doctor.      At this time we ask that: You schedule a routine office visit with your physician to follow your diabetes and labs. Please call the clinic at [phone] option 1 to schedule.     Your prescription: ( Janumet) Has been refilled for 1 time so you may have time for the above noted follow-up.      Thank you,      Schoen, Gregory MD  Care Team

## 2022-06-15 RX ORDER — SITAGLIPTIN AND METFORMIN HYDROCHLORIDE 1000; 50 MG/1; MG/1
TABLET, FILM COATED ORAL
Qty: 180 TABLET | Refills: 0 | Status: SHIPPED | OUTPATIENT
Start: 2022-06-15 | End: 2022-09-23

## 2022-06-15 NOTE — TELEPHONE ENCOUNTER
Pending Prescriptions:                       Disp   Refills    JANUMET  MG tablet [Pharmacy Med N*180 ta*0            Sig: TAKE ONE TABLET BY MOUTH TWICE A DAY WITH MEALS    Medication is being filled for 1 time mohini refill only due to:  Patient is due for DM visit with A1C lab. (non-fasting)    Please call and help schedule.  Thank you!      Chiqui Smith, WENDYN, RN, PHN  [facility] RN for [place] [place]  Alena 15, 2022

## 2022-07-20 ENCOUNTER — TRANSFERRED RECORDS (OUTPATIENT)
Dept: HEALTH INFORMATION MANAGEMENT | Facility: CLINIC | Age: 62
End: 2022-07-20

## 2022-07-20 LAB — RETINOPATHY: POSITIVE

## 2022-09-23 ENCOUNTER — OFFICE VISIT (OUTPATIENT)
Dept: FAMILY MEDICINE | Facility: CLINIC | Age: 62
End: 2022-09-23
Payer: COMMERCIAL

## 2022-09-23 VITALS
WEIGHT: 191 LBS | BODY MASS INDEX: 25.41 KG/M2 | OXYGEN SATURATION: 97 % | TEMPERATURE: 97.7 F | DIASTOLIC BLOOD PRESSURE: 74 MMHG | SYSTOLIC BLOOD PRESSURE: 130 MMHG | HEART RATE: 84 BPM

## 2022-09-23 DIAGNOSIS — I10 BENIGN ESSENTIAL HYPERTENSION: ICD-10-CM

## 2022-09-23 DIAGNOSIS — E11.9 TYPE 2 DIABETES MELLITUS WITHOUT COMPLICATION, WITHOUT LONG-TERM CURRENT USE OF INSULIN (H): Primary | ICD-10-CM

## 2022-09-23 DIAGNOSIS — E78.5 HYPERLIPIDEMIA LDL GOAL <100: ICD-10-CM

## 2022-09-23 DIAGNOSIS — Z12.11 SCREEN FOR COLON CANCER: ICD-10-CM

## 2022-09-23 DIAGNOSIS — H50.9 STRABISMUS: ICD-10-CM

## 2022-09-23 PROCEDURE — 99207 PR FOOT EXAM NO CHARGE: CPT | Performed by: FAMILY MEDICINE

## 2022-09-23 PROCEDURE — 99214 OFFICE O/P EST MOD 30 MIN: CPT | Performed by: FAMILY MEDICINE

## 2022-09-23 RX ORDER — GLIMEPIRIDE 1 MG/1
1 TABLET ORAL DAILY
Qty: 90 TABLET | Refills: 3 | Status: SHIPPED | OUTPATIENT
Start: 2022-09-23 | End: 2023-09-08

## 2022-09-23 RX ORDER — SITAGLIPTIN AND METFORMIN HYDROCHLORIDE 1000; 50 MG/1; MG/1
1 TABLET, FILM COATED ORAL 2 TIMES DAILY WITH MEALS
Qty: 180 TABLET | Refills: 0 | Status: SHIPPED | OUTPATIENT
Start: 2022-09-23 | End: 2023-08-29

## 2022-09-23 RX ORDER — AMLODIPINE AND BENAZEPRIL HYDROCHLORIDE 5; 20 MG/1; MG/1
1 CAPSULE ORAL DAILY
Qty: 90 CAPSULE | Refills: 3 | Status: SHIPPED | OUTPATIENT
Start: 2022-09-23 | End: 2023-09-08

## 2022-09-23 RX ORDER — ATORVASTATIN CALCIUM 80 MG/1
80 TABLET, FILM COATED ORAL EVERY MORNING
Qty: 90 TABLET | Refills: 3 | Status: SHIPPED | OUTPATIENT
Start: 2022-09-23 | End: 2023-09-08

## 2022-09-23 ASSESSMENT — PAIN SCALES - GENERAL: PAINLEVEL: MILD PAIN (3)

## 2022-09-23 NOTE — PROGRESS NOTES
"  Assessment & Plan     Type 2 diabetes mellitus without complication, without long-term current use of insulin (H)  Due for labs.  Not monitoring sugars but denies any symptoms of hypoglycemia and reports taking medications as prescribed (janumet and amaryl).  He continues to smoke without interest in quitting but states it is not frequent.     Hyperlipidemia LDL goal <100  Currently on atorvastatin 80 mg daily and tolerating dose without side effects.  Due for labs. Barring any liver issues or evidence of inadequate control, will continue with same.     Benign essential hypertension  BP appears controlled on current dose of lotrel.  No change indicated unless labs indicate renal function or potassium issues.     Strabismus of left eye  Does follow with optometry and ophthalmology.      Screen for colon cancer  Discussed and he has interest but states not at this time.     Also declines vaccine offerings for influenza, covid and pneumococcal pneumonia.        Nicotine/Tobacco Cessation:  He reports that he has been smoking cigarettes. He has been smoking about 0.25 packs per day. He has never used smokeless tobacco.  Nicotine/Tobacco Cessation Plan:   Information offered: Patient not interested at this time      BMI:   Estimated body mass index is 25.41 kg/m  as calculated from the following:    Height as of 10/8/21: 1.847 m (6' 0.7\").    Weight as of this encounter: 86.6 kg (191 lb).           Return in about 6 months (around 3/23/2023) for with me.    Gregory G. Schoen, MD  [facility] CHAKA Carney is a 62 year old, presenting for the following health issues:  Diabetes      History of Present Illness       Diabetes:   He presents for follow up of diabetes.  He is checking home blood glucose a few times a month. He checks blood glucose before meals.  Blood glucose is never over 200 and never under 70. When his blood glucose is low, the patient is asymptomatic for confusion, blurred " vision, lethargy and reports not feeling dizzy, shaky, or weak.  He has no concerns regarding his diabetes at this time.  He is not experiencing numbness or burning in feet, excessive thirst, blurry vision, weight changes or redness, sores or blisters on feet.         He eats 2-3 servings of fruits and vegetables daily.He consumes 0 sweetened beverage(s) daily.He exercises with enough effort to increase his heart rate 60 or more minutes per day.  He exercises with enough effort to increase his heart rate 7 days per week.   He is taking medications regularly.       States he is feeling well.  Eats a reasonable diet and does splurge a bit.  He is not checking sugars regularly but has not had any symptoms of hypoglycemia.  He is taking his meds as directed.    No other real complaints other than joint aches.  Left hip replacement 8 years ago. Both shoulders bother him and right shoulder surgery didn't turn out well. Is due for labs.  Does not want any vaccines and again defers consideration of colon cancer screening feeling he is low risk based on family history.         Current Outpatient Medications   Medication Sig Dispense Refill     amLODIPine-benazepril (LOTREL) 5-20 MG capsule TAKE ONE CAPSULE BY MOUTH ONCE DAILY 90 capsule 3     atorvastatin (LIPITOR) 80 MG tablet TAKE ONE TABLET BY MOUTH ONCE DAILY IN THE MORNING 90 tablet 3     glimepiride (AMARYL) 1 MG tablet TAKE ONE TABLET BY MOUTH ONCE DAILY 90 tablet 3     JANUMET  MG tablet TAKE ONE TABLET BY MOUTH TWICE A DAY WITH MEALS 180 tablet 0     acetaminophen (TYLENOL) 325 MG tablet Take 3 tablets (975 mg) by mouth every 8 hours as needed for mild pain (Patient not taking: No sig reported) 50 tablet 1     methocarbamol (ROBAXIN) 500 MG tablet Take 1 tablet (500 mg) by mouth 3 times daily as needed for muscle spasms (Patient not taking: No sig reported) 20 tablet 0         Review of Systems   CONSTITUTIONAL: NEGATIVE for fever, chills, change in  weight  INTEGUMENTARY/SKIN: NEGATIVE for worrisome rashes, moles or lesions  EYES: sees ophthalmology/optometry on a regular basis due to his strabismus  ENT/MOUTH: NEGATIVE for ear, mouth and throat problems  RESP: NEGATIVE for significant cough or SOB  CV: NEGATIVE for chest pain, palpitations or peripheral edema  GI: NEGATIVE for nausea, abdominal pain, heartburn, or change in bowel habits  : NEGATIVE for frequency, dysuria, or hematuria  MUSCULOSKELETAL: See comments above regarding shoulder and joint issues.  NEURO: NEGATIVE for weakness, dizziness or paresthesias  ENDOCRINE: NEGATIVE for temperature intolerance, skin/hair changes; has diabetes and does not monitor  HEME: NEGATIVE for bleeding problems  PSYCHIATRIC: NEGATIVE for changes in mood or affect      Objective    /74   Pulse 84   Temp 97.7  F (36.5  C) (Temporal)   Wt 86.6 kg (191 lb)   SpO2 97%   BMI 25.41 kg/m    Body mass index is 25.41 kg/m .  Physical Exam   GENERAL: healthy, alert and no distress  EYES: Eyes grossly normal to inspection with exophoria of left eye, PERRL and conjunctivae and sclerae normal; fundoscopic exam without lesions noted.   HENT: ear canals and TM's normal, nose and mouth without ulcers or lesions  NECK: no adenopathy, no asymmetry, masses, or scars and thyroid normal to palpation  RESP: lungs clear to auscultation - no rales, rhonchi or wheezes  CV: regular rate and rhythm, normal S1 S2, no S3 or S4, no murmur, click or rub, no peripheral edema and peripheral pulses strong  ABDOMEN: soft, nontender, no hepatosplenomegaly, no masses and bowel sounds normal  MS: no gross musculoskeletal defects noted, no edema  Foot exam: no skin lesions, normal pulses and capillary refill, normal monofilament exam.   SKIN: no suspicious lesions or rashes  NEURO: Normal strength and tone, mentation intact and speech normal but speaks rapidly  PSYCH: mentation appears normal, affect normal/bright     Not fasting today and  prefers to come back for lab draw.

## 2022-09-25 PROBLEM — E11.9 DIABETES MELLITUS, TYPE 2 (H): Status: ACTIVE | Noted: 2022-09-25

## 2022-09-25 PROBLEM — E11.40 TYPE 2 DIABETES MELLITUS WITH DIABETIC NEUROPATHY, WITHOUT LONG-TERM CURRENT USE OF INSULIN (H): Status: RESOLVED | Noted: 2018-05-25 | Resolved: 2022-09-25

## 2022-09-27 ENCOUNTER — LAB (OUTPATIENT)
Dept: LAB | Facility: CLINIC | Age: 62
End: 2022-09-27
Payer: COMMERCIAL

## 2022-09-27 LAB
ALT SERPL W P-5'-P-CCNC: 44 U/L (ref 0–70)
ANION GAP SERPL CALCULATED.3IONS-SCNC: 3 MMOL/L (ref 3–14)
BUN SERPL-MCNC: 16 MG/DL (ref 7–30)
CALCIUM SERPL-MCNC: 9.7 MG/DL (ref 8.5–10.1)
CHLORIDE BLD-SCNC: 105 MMOL/L (ref 94–109)
CHOLEST SERPL-MCNC: 151 MG/DL
CO2 SERPL-SCNC: 30 MMOL/L (ref 20–32)
CREAT SERPL-MCNC: 0.7 MG/DL (ref 0.66–1.25)
CREAT UR-MCNC: 160 MG/DL
FASTING STATUS PATIENT QL REPORTED: YES
GFR SERPL CREATININE-BSD FRML MDRD: >90 ML/MIN/1.73M2
GLUCOSE BLD-MCNC: 141 MG/DL (ref 70–99)
HBA1C MFR BLD: 6 % (ref 0–5.6)
HDLC SERPL-MCNC: 86 MG/DL
LDLC SERPL CALC-MCNC: 47 MG/DL
MICROALBUMIN UR-MCNC: 140 MG/L
MICROALBUMIN/CREAT UR: 87.5 MG/G CR (ref 0–17)
NONHDLC SERPL-MCNC: 65 MG/DL
POTASSIUM BLD-SCNC: 4.3 MMOL/L (ref 3.4–5.3)
SODIUM SERPL-SCNC: 138 MMOL/L (ref 133–144)
TRIGL SERPL-MCNC: 89 MG/DL
TSH SERPL DL<=0.005 MIU/L-ACNC: 0.76 MU/L (ref 0.4–4)

## 2022-09-27 PROCEDURE — 84460 ALANINE AMINO (ALT) (SGPT): CPT

## 2022-09-27 PROCEDURE — 36415 COLL VENOUS BLD VENIPUNCTURE: CPT

## 2022-09-27 PROCEDURE — 80061 LIPID PANEL: CPT

## 2022-09-27 PROCEDURE — 82043 UR ALBUMIN QUANTITATIVE: CPT

## 2022-09-27 PROCEDURE — 80048 BASIC METABOLIC PNL TOTAL CA: CPT

## 2022-09-27 PROCEDURE — 84443 ASSAY THYROID STIM HORMONE: CPT

## 2022-09-27 PROCEDURE — 83036 HEMOGLOBIN GLYCOSYLATED A1C: CPT

## 2022-10-03 ENCOUNTER — TELEPHONE (OUTPATIENT)
Dept: FAMILY MEDICINE | Facility: CLINIC | Age: 62
End: 2022-10-03

## 2022-10-03 NOTE — TELEPHONE ENCOUNTER
----- Message from Gregory G Schoen, MD sent at 10/3/2022  1:00 PM CDT -----  Please inform Rommel that his diabetes labs indicate that he has good control of his blood sugars with his A1c of 6.0%.  No changes to his diabetes meds based on this.  His kidney function blood work is normal but the urine is showing some protein.  Lotrel is an appropriate treatment for this so no changes indicated.  Cholesterol profile is excellent on atorvastatin and should continue with that.  We also screened for thyroid disease and that was normal.  Overall, no changes indicated but would encourage him to check blood sugars more frequently, especially if feels nauseated, is sweating, has headaches as these are signs of low blood sugars and if anything, we might need to consider decreasing some of his diabetes medication if showing more than occasional glucose below 80.    He should otherwise follow up in 6 months.   Electronically signed by Greg Schoen, MD

## 2022-10-03 NOTE — LETTER
[address] [address]  [phone]        October 10, 2022    Amanuel Mccord  [address] [address] [address]          Dear Amanuel,    RESULTS:    Diabetes labs indicate that he has good control of his blood sugars with his A1c of 6.0%.  No changes to his diabetes meds based on this.  His kidney function blood work is normal but the urine is showing some protein.  Lotrel is an appropriate treatment for this so no changes indicated.  Cholesterol profile is excellent on atorvastatin and should continue with that.  We also screened for thyroid disease and that was normal.  Overall, no changes indicated but would encourage him to check blood sugars more frequently, especially if feels nauseated, is sweating, has headaches as these are signs of low blood sugars and if anything, we might need to consider decreasing some of his diabetes medication if showing more than occasional glucose below 80.    He should otherwise follow up in 6 months.     Sincerely,        Care Team

## 2022-10-10 NOTE — TELEPHONE ENCOUNTER
2nd attempt. Left message for patient to return call.  Sending letter  Trudy Olmedo MA 10/10/2022

## 2023-06-01 ENCOUNTER — TRANSFERRED RECORDS (OUTPATIENT)
Dept: MULTI SPECIALTY CLINIC | Facility: CLINIC | Age: 63
End: 2023-06-01

## 2023-06-01 LAB — RETINOPATHY: NORMAL

## 2023-08-22 ENCOUNTER — TELEPHONE (OUTPATIENT)
Dept: FAMILY MEDICINE | Facility: CLINIC | Age: 63
End: 2023-08-22
Payer: COMMERCIAL

## 2023-08-22 NOTE — TELEPHONE ENCOUNTER
Reason for Call:  Appointment Request    Patient requesting this type of appt: Chronic Diease Management/Medication/Follow-Up    Requested provider: Schoen, Gregory G    Reason patient unable to be scheduled: Not within requested timeframe    When does patient want to be seen/preferred time: 3-7 days    Comments: F/up for diabetes, med refills     Okay to leave a detailed message?: Yes at Cell number on file:    Telephone Information:   Mobile [phone]       Call taken on 8/22/2023 at 10:23 AM by Lucy Leija

## 2023-09-08 ENCOUNTER — OFFICE VISIT (OUTPATIENT)
Dept: FAMILY MEDICINE | Facility: CLINIC | Age: 63
End: 2023-09-08
Payer: COMMERCIAL

## 2023-09-08 VITALS
TEMPERATURE: 98.2 F | SYSTOLIC BLOOD PRESSURE: 138 MMHG | HEIGHT: 73 IN | WEIGHT: 193.6 LBS | HEART RATE: 80 BPM | BODY MASS INDEX: 25.66 KG/M2 | OXYGEN SATURATION: 97 % | DIASTOLIC BLOOD PRESSURE: 78 MMHG | RESPIRATION RATE: 20 BRPM

## 2023-09-08 DIAGNOSIS — E11.9 TYPE 2 DIABETES MELLITUS WITHOUT COMPLICATION, WITHOUT LONG-TERM CURRENT USE OF INSULIN (H): ICD-10-CM

## 2023-09-08 DIAGNOSIS — Z12.5 SCREENING FOR MALIGNANT NEOPLASM OF PROSTATE: ICD-10-CM

## 2023-09-08 DIAGNOSIS — Z00.00 WELLNESS EXAMINATION: Primary | ICD-10-CM

## 2023-09-08 DIAGNOSIS — Z12.11 SCREEN FOR COLON CANCER: ICD-10-CM

## 2023-09-08 DIAGNOSIS — I10 BENIGN ESSENTIAL HYPERTENSION: ICD-10-CM

## 2023-09-08 DIAGNOSIS — M16.0 PRIMARY OSTEOARTHRITIS OF BOTH HIPS: ICD-10-CM

## 2023-09-08 LAB
ANION GAP SERPL CALCULATED.3IONS-SCNC: 11 MMOL/L (ref 7–15)
BUN SERPL-MCNC: 15.8 MG/DL (ref 8–23)
CALCIUM SERPL-MCNC: 10.3 MG/DL (ref 8.8–10.2)
CHLORIDE SERPL-SCNC: 100 MMOL/L (ref 98–107)
CHOLEST SERPL-MCNC: 173 MG/DL
CREAT SERPL-MCNC: 0.74 MG/DL (ref 0.67–1.17)
CREAT UR-MCNC: 104.1 MG/DL
DEPRECATED HCO3 PLAS-SCNC: 25 MMOL/L (ref 22–29)
EGFRCR SERPLBLD CKD-EPI 2021: >90 ML/MIN/1.73M2
GLUCOSE SERPL-MCNC: 81 MG/DL (ref 70–99)
HBA1C MFR BLD: 6.4 %
HDLC SERPL-MCNC: 81 MG/DL
LDLC SERPL CALC-MCNC: 74 MG/DL
MICROALBUMIN UR-MCNC: 210.3 MG/L
MICROALBUMIN/CREAT UR: 202.02 MG/G CR (ref 0–17)
NONHDLC SERPL-MCNC: 92 MG/DL
POTASSIUM SERPL-SCNC: 4.2 MMOL/L (ref 3.4–5.3)
SODIUM SERPL-SCNC: 136 MMOL/L (ref 136–145)
TRIGL SERPL-MCNC: 89 MG/DL
TSH SERPL DL<=0.005 MIU/L-ACNC: 0.7 UIU/ML (ref 0.3–4.2)

## 2023-09-08 PROCEDURE — 99214 OFFICE O/P EST MOD 30 MIN: CPT | Mod: 25 | Performed by: FAMILY MEDICINE

## 2023-09-08 PROCEDURE — 82570 ASSAY OF URINE CREATININE: CPT | Performed by: FAMILY MEDICINE

## 2023-09-08 PROCEDURE — 82043 UR ALBUMIN QUANTITATIVE: CPT | Performed by: FAMILY MEDICINE

## 2023-09-08 PROCEDURE — 80048 BASIC METABOLIC PNL TOTAL CA: CPT | Performed by: FAMILY MEDICINE

## 2023-09-08 PROCEDURE — 83036 HEMOGLOBIN GLYCOSYLATED A1C: CPT | Performed by: FAMILY MEDICINE

## 2023-09-08 PROCEDURE — 99207 PR FOOT EXAM NO CHARGE: CPT | Mod: 25 | Performed by: FAMILY MEDICINE

## 2023-09-08 PROCEDURE — 84443 ASSAY THYROID STIM HORMONE: CPT | Performed by: FAMILY MEDICINE

## 2023-09-08 PROCEDURE — 99396 PREV VISIT EST AGE 40-64: CPT | Performed by: FAMILY MEDICINE

## 2023-09-08 PROCEDURE — 80061 LIPID PANEL: CPT | Performed by: FAMILY MEDICINE

## 2023-09-08 PROCEDURE — G0103 PSA SCREENING: HCPCS | Performed by: FAMILY MEDICINE

## 2023-09-08 PROCEDURE — 36415 COLL VENOUS BLD VENIPUNCTURE: CPT | Performed by: FAMILY MEDICINE

## 2023-09-08 RX ORDER — SITAGLIPTIN AND METFORMIN HYDROCHLORIDE 1000; 50 MG/1; MG/1
TABLET, FILM COATED ORAL
Qty: 180 TABLET | Refills: 3 | Status: SHIPPED | OUTPATIENT
Start: 2023-09-08

## 2023-09-08 RX ORDER — ATORVASTATIN CALCIUM 80 MG/1
80 TABLET, FILM COATED ORAL EVERY MORNING
Qty: 90 TABLET | Refills: 3 | Status: SHIPPED | OUTPATIENT
Start: 2023-09-08

## 2023-09-08 RX ORDER — GLIMEPIRIDE 1 MG/1
1 TABLET ORAL DAILY
Qty: 90 TABLET | Refills: 3 | Status: SHIPPED | OUTPATIENT
Start: 2023-09-08

## 2023-09-08 RX ORDER — AMLODIPINE AND BENAZEPRIL HYDROCHLORIDE 5; 20 MG/1; MG/1
1 CAPSULE ORAL DAILY
Qty: 90 CAPSULE | Refills: 3 | Status: SHIPPED | OUTPATIENT
Start: 2023-09-08

## 2023-09-08 ASSESSMENT — PAIN SCALES - GENERAL: PAINLEVEL: EXTREME PAIN (8)

## 2023-09-08 NOTE — PROGRESS NOTES
SUBJECTIVE:   CC: Rommel is an 63 year old who presents for preventative health visit.       9/8/2023    12:08 PM   Additional Questions   Roomed by Jenny KING       Healthy Habits:     Getting at least 3 servings of Calcium per day:  NO    Bi-annual eye exam:  Yes    Dental care twice a year:  Yes    Sleep apnea or symptoms of sleep apnea:  None    Diet:  Diabetic    Frequency of exercise:  6-7 days/week    Duration of exercise:  Greater than 60 minutes    Taking medications regularly:  Yes    Medication side effects:  None    Additional concerns today:  No      Today's PHQ-2 Score:       9/8/2023    12:06 PM   PHQ-2 ( 1999 [employer])   Q1: Little interest or pleasure in doing things 1   Q2: Feeling down, depressed or hopeless 0   PHQ-2 Score 1   Q1: Little interest or pleasure in doing things Several days   Q2: Feeling down, depressed or hopeless Not at all   PHQ-2 Score 1       Is ready to get right hip replaced. Notes he has a lot of joint aches/pains and low back pain and has learned to just deal with it. However, he feels the time is right to get his hip done.  Otherwise feeling well, does some biking, admits he probably drinks more than we would recommend but says it is not a problem and does not impact his health or function.         Diabetes Follow-up    How often are you checking your blood sugar? A few times a week  What time of day are you checking your blood sugars (select all that apply)?   After exercise  Have you had any blood sugars above 200?  No  Have you had any blood sugars below 70?  No  What symptoms do you notice when your blood sugar is low?  None  What concerns do you have today about your diabetes? None   Do you have any of these symptoms? (Select all that apply)  No numbness or tingling in feet.  No redness, sores or blisters on feet.  No complaints of excessive thirst.  No reports of blurry vision.  No significant changes to weight.  Have you had a diabetic eye exam in the last 12 months? Yes-  "Date of last eye exam: June 2023,  Location: Dr. Pettit.     Does not check sugars.  Says he \"cheats\" and has sweets on Saturdays once in a while. No episodes with hypoglycemia symptoms.         BP Readings from Last 2 Encounters:   09/08/23 138/78   09/23/22 130/74     Hemoglobin A1C (%)   Date Value   09/27/2022 6.0 (H)   10/12/2021 6.3 (H)   07/06/2020 6.7 (H)   12/20/2019 6.7 (H)     LDL Cholesterol Calculated (mg/dL)   Date Value   09/27/2022 47   10/12/2021 53   12/20/2019 73   07/03/2018 63         Have you ever done Advance Care Planning? (For example, a Health Directive, POLST, or a discussion with a medical provider or your loved ones about your wishes): No, advance care planning information given to patient to review.  Patient declined advance care planning discussion at this time.    Social History     Tobacco Use    Smoking status: Every Day     Packs/day: 0.25     Types: Cigarettes    Smokeless tobacco: Never   Substance Use Topics    Alcohol use: Yes             9/8/2023    12:05 PM   Alcohol Use   Prescreen: >3 drinks/day or >7 drinks/week? Yes   AUDIT SCORE  11   States he doesn't drink daily but will often have 3-4 drinks, denies it being an issue with how he functions.      Last PSA:   PSA   Date Value Ref Range Status   07/03/2018 2.18 0 - 4 ug/L Final     Comment:     Assay Method:  Chemiluminescence using Siemens Vista analyzer       Reviewed orders with patient. Reviewed health maintenance and updated orders accordingly - Yes      Reviewed and updated as needed this visit by clinical staff   Tobacco  Allergies  Meds              Reviewed and updated as needed this visit by Provider                     Review of Systems  CONSTITUTIONAL: NEGATIVE for fever, chills, change in weight  INTEGUMENTARY/SKIN: NEGATIVE for worrisome rashes, moles or lesions  EYES: NEGATIVE for vision changes or irritation; left eye is \"lazy\" and his vision in that eye is poor.  Sees eye doctor twice a year to make " "sure his good eye has no issues.   ENT: NEGATIVE for ear, mouth and throat problems  RESP: NEGATIVE for significant cough or SOB  CV: NEGATIVE for chest pain, palpitations or peripheral edema  GI: NEGATIVE for nausea, abdominal pain, heartburn, or change in bowel habits   male: negative for dysuria, hematuria, decreased urinary stream, erectile dysfunction, urethral discharge  MUSCULOSKELETAL: diffuse joint aches and pains. Focus is on right hip at this time.   NEURO: NEGATIVE for weakness, dizziness or paresthesias  PSYCHIATRIC: NEGATIVE for changes in mood or affect    OBJECTIVE:   /78 (BP Location: Left arm, Patient Position: Sitting, Cuff Size: Adult Regular)   Pulse 80   Temp 98.2  F (36.8  C) (Temporal)   Resp 20   Ht 1.842 m (6' 0.5\")   Wt 87.8 kg (193 lb 9.6 oz)   SpO2 97%   BMI 25.90 kg/m      Physical Exam  GENERAL: healthy, alert and no distress  EYES: Eyes with disconjugate gaze, left eye with exotropia, PERRL and conjunctivae and sclerae normal. Fundi are clear with sharp discs and no vascular abnormalities.  Does have exotropia of the left eye with reported decreased visual acuity.   HENT: ear canals and TM's normal, nose and mouth without ulcers or lesions  NECK: no adenopathy, no asymmetry, masses, or scars and thyroid normal to palpation  RESP: lungs clear to auscultation - no rales, rhonchi or wheezes  CV: regular rate and rhythm, normal S1 S2, no S3 or S4, no murmur, click or rub, no peripheral edema and peripheral pulses strong  ABDOMEN: soft, nontender, no hepatosplenomegaly, no masses and bowel sounds normal  MS: no gross musculoskeletal defects noted, no edema  SKIN: no suspicious lesions or rashes  NEURO: Normal strength and tone, mentation intact and speech normal  PSYCH: mentation appears normal, affect normal/bright  Foot exam: no skin lesions, normal pulses and capillary refill, normal hair on dorsum of foot and toes, normal monofilament exam.       Diagnostic Test " Results:  Labs reviewed in Epic  Results for orders placed or performed in visit on 09/08/23   TSH with free T4 reflex     Status: Normal   Result Value Ref Range    TSH 0.70 0.30 - 4.20 uIU/mL   Albumin Random Urine Quantitative with Creat Ratio     Status: Abnormal   Result Value Ref Range    Creatinine Urine mg/dL 104.1 mg/dL    Albumin Urine mg/L 210.3 mg/L    Albumin Urine mg/g Cr 202.02 (H) 0.00 - 17.00 mg/g Cr   Lipid panel reflex to direct LDL Non-fasting     Status: Normal   Result Value Ref Range    Cholesterol 173 <200 mg/dL    Triglycerides 89 <150 mg/dL    Direct Measure HDL 81 >=40 mg/dL    LDL Cholesterol Calculated 74 <=100 mg/dL    Non HDL Cholesterol 92 <130 mg/dL    Narrative    Cholesterol  Desirable:  <200 mg/dL    Triglycerides  Normal:  Less than 150 mg/dL  Borderline High:  150-199 mg/dL  High:  200-499 mg/dL  Very High:  Greater than or equal to 500 mg/dL    Direct Measure HDL  Female:  Greater than or equal to 50 mg/dL   Male:  Greater than or equal to 40 mg/dL    LDL Cholesterol  Desirable:  <100mg/dL  Above Desirable:  100-129 mg/dL   Borderline High:  130-159 mg/dL   High:  160-189 mg/dL   Very High:  >= 190 mg/dL    Non HDL Cholesterol  Desirable:  130 mg/dL  Above Desirable:  130-159 mg/dL  Borderline High:  160-189 mg/dL  High:  190-219 mg/dL  Very High:  Greater than or equal to 220 mg/dL   BASIC METABOLIC PANEL     Status: Abnormal   Result Value Ref Range    Sodium 136 136 - 145 mmol/L    Potassium 4.2 3.4 - 5.3 mmol/L    Chloride 100 98 - 107 mmol/L    Carbon Dioxide (CO2) 25 22 - 29 mmol/L    Anion Gap 11 7 - 15 mmol/L    Urea Nitrogen 15.8 8.0 - 23.0 mg/dL    Creatinine 0.74 0.67 - 1.17 mg/dL    Calcium 10.3 (H) 8.8 - 10.2 mg/dL    Glucose 81 70 - 99 mg/dL    GFR Estimate >90 >60 mL/min/1.73m2   HEMOGLOBIN A1C     Status: Abnormal   Result Value Ref Range    Hemoglobin A1C 6.4 (H) <5.7 %       ASSESSMENT/PLAN:   (Z00.00) Wellness examination  (primary encounter  "diagnosis)  Comment: Up to date on vaccines he is willing to accept.  Declines shingles. Also declines colon cancer screening.   Plan: REVIEW OF HEALTH MAINTENANCE PROTOCOL ORDERS        Annual exams recommended.     (E11.9) Type 2 diabetes mellitus without complication, without long-term current use of insulin (H)  Comment: Does not monitor but is compliant with taking meds and diet for the most part.  Does smoke, unfortunately, but states it is cigars and he doesn't inhale.    Plan: No changes in diabetes meds with A1c of 6.4%.  He is on amlodipine 5-benazepril 20 daily.  Will recheck in 6 months.     (I10) Benign essential hypertension  Comment: BP is controlled on Lotrel; normal renal function and electrolytes; no change.   Plan: amLODIPine-benazepril         (LOTREL) 5-20 MG capsule            (Z12.11) Screen for colon cancer  Comment: Is aware of options and does not want to proceed with any of them  Plan: Will address again next year.    (M16.0) Primary osteoarthritis of both hips  Comment: He will arrange for consult with ortho to discuss hip replacement.             COUNSELING:   Reviewed preventive health counseling, as reflected in patient instructions       Regular exercise       Healthy diet/nutrition       Vision screening       Alcohol Use       BMI:   Estimated body mass index is 25.9 kg/m  as calculated from the following:    Height as of this encounter: 1.842 m (6' 0.5\").    Weight as of this encounter: 87.8 kg (193 lb 9.6 oz).         He reports that he has been smoking cigarettes. He has been smoking an average of .25 packs per day. He has never used smokeless tobacco.  Nicotine/Tobacco Cessation Plan:   Information offered: Patient not interested at this time    Gregory G. Schoen, MD  [facility]  "

## 2023-09-11 LAB — PSA SERPL DL<=0.01 NG/ML-MCNC: 3.8 NG/ML (ref 0–4.5)

## 2023-09-19 ENCOUNTER — OFFICE VISIT (OUTPATIENT)
Dept: ORTHOPEDICS | Facility: CLINIC | Age: 63
End: 2023-09-19
Payer: COMMERCIAL

## 2023-09-19 ENCOUNTER — ANCILLARY PROCEDURE (OUTPATIENT)
Dept: GENERAL RADIOLOGY | Facility: CLINIC | Age: 63
End: 2023-09-19
Attending: ORTHOPAEDIC SURGERY
Payer: COMMERCIAL

## 2023-09-19 VITALS
BODY MASS INDEX: 26.01 KG/M2 | RESPIRATION RATE: 18 BRPM | HEART RATE: 90 BPM | HEIGHT: 72 IN | WEIGHT: 192 LBS | DIASTOLIC BLOOD PRESSURE: 99 MMHG | SYSTOLIC BLOOD PRESSURE: 168 MMHG

## 2023-09-19 DIAGNOSIS — M16.11 PRIMARY OSTEOARTHRITIS OF RIGHT HIP: ICD-10-CM

## 2023-09-19 DIAGNOSIS — G89.29 CHRONIC SACROILIAC JOINT PAIN: ICD-10-CM

## 2023-09-19 DIAGNOSIS — M25.551 PAIN OF RIGHT HIP: ICD-10-CM

## 2023-09-19 DIAGNOSIS — M25.551 PAIN OF RIGHT HIP: Primary | ICD-10-CM

## 2023-09-19 DIAGNOSIS — M53.3 CHRONIC SACROILIAC JOINT PAIN: ICD-10-CM

## 2023-09-19 PROCEDURE — 99204 OFFICE O/P NEW MOD 45 MIN: CPT | Performed by: ORTHOPAEDIC SURGERY

## 2023-09-19 PROCEDURE — 73502 X-RAY EXAM HIP UNI 2-3 VIEWS: CPT | Mod: TC | Performed by: RADIOLOGY

## 2023-09-19 NOTE — PROGRESS NOTES
Amanuel Mccord is a 63 year old male who is seen as self referral for right hip pain.  He feels this primarily in the buttock and near the SI joint.  He has had this for the past year and a half.  He has previously had left total hip arthroplasty through [facility].  That pain was in the groin, but he was thinking this was hip also.  He describes sharp burning pain in the buttock rated 10 out of 10.  Doing anything physical bothers it.    X-ray shows good joint space on the right hip on most of the joint.  There is a far lateral calcified labrum that is getting closer to contact with the femoral head.  Left total hip arthroplasty is in good position.    Past Medical History:   Diagnosis Date    PONV (postoperative nausea and vomiting)        Past Surgical History:   Procedure Laterality Date    ARTHROSCOPY SHOULDER DECOMPRESSION Right 2/24/2020    Procedure: subacromial decompression with partial acromioplasty;  Surgeon: Hong Edmond DO;  Location: [facility]    ARTHROSCOPY SHOULDER DISTAL CLAVICLE REPAIR Right 2/24/2020    Procedure: distal clavicle excision;  Surgeon: Hong Edmond DO;  Location: [facility]    ARTHROSCOPY SHOULDER ROTATOR CUFF REPAIR Right 2/24/2020    Procedure: right shoulder arthroscopy with rotator cuff repair and labral debridement;  Surgeon: Hong Edmond DO;  Location: [facility] OR       Family History   Problem Relation Age of Onset    Hypertension Mother     Hyperlipidemia Mother     Hyperlipidemia Father     Hypertension Father     Diabetes Brother     Diabetes Sister        Social History     Socioeconomic History    Marital status:      Spouse name: Not on file    Number of children: Not on file    Years of education: Not on file    Highest education level: Not on file   Occupational History    Not on file   Tobacco Use    Smoking status: Some Days     Types: Cigars    Smokeless tobacco: Never   Substance and Sexual Activity    Alcohol use: Yes    Drug use: No     Sexual activity: Yes   Other Topics Concern    Parent/sibling w/ CABG, MI or angioplasty before 65F 55M? Not Asked   Social History Narrative    Not on file     Social Determinants of Health     Financial Resource Strain: Not on file   Food Insecurity: Not on file   Transportation Needs: Not on file   Physical Activity: Not on file   Stress: Not on file   Social Connections: Not on file   Intimate Partner Violence: Not on file   Housing Stability: Not on file       Current Outpatient Medications   Medication Sig Dispense Refill    acetaminophen (TYLENOL) 325 MG tablet Take 3 tablets (975 mg) by mouth every 8 hours as needed for mild pain 50 tablet 1    amLODIPine-benazepril (LOTREL) 5-20 MG capsule Take 1 capsule by mouth daily 90 capsule 3    atorvastatin (LIPITOR) 80 MG tablet Take 1 tablet (80 mg) by mouth every morning 90 tablet 3    glimepiride (AMARYL) 1 MG tablet Take 1 tablet (1 mg) by mouth daily 90 tablet 3    methocarbamol (ROBAXIN) 500 MG tablet Take 1 tablet (500 mg) by mouth 3 times daily as needed for muscle spasms (Patient not taking: Reported on 9/8/2023) 20 tablet 0    sitagliptin-metFORMIN (JANUMET)  MG tablet TAKE ONE TABLET BY MOUTH TWO TIMES A DAY WITH MEALS 180 tablet 3       No Known Allergies    REVIEW OF SYSTEMS:  CONSTITUTIONAL:  NEGATIVE for fever, chills, change in weight, not feeling tired  SKIN:  NEGATIVE for worrisome rashes, no skin lumps, no skin ulcers and no non-healing wounds  EYES:  NEGATIVE for vision changes or irritation.  ENT/MOUTH:  NEGATIVE.  No hearing loss, no hoarseness, no difficulty swallowing.  RESP:  NEGATIVE. No cough or shortness of breath.  CV:  NEGATIVE for chest pain, palpitations or peripheral edema  GI:  NEGATIVE for nausea, abdominal pain, heartburn, or change in bowel habits  :  Negative. No dysuria, no hematuria  MUSCULOSKELETAL:  See HPI above  NEURO:  NEGATIVE . No headaches, no dizziness,  no numbness  ENDOCRINE:  NEGATIVE for temperature  intolerance, skin/hair changes  HEME/ALLERGY/IMMUNE:  NEGATIVE for bleeding problems  PSYCHIATRIC:  NEGATIVE. no anxiety, no depression.     Exam:  Vitals: BP (!) 168/99   Pulse 90   Resp 18   Ht 1.829 m (6')   Wt 87.1 kg (192 lb)   BMI 26.04 kg/m    BMI= Body mass index is 26.04 kg/m .  Constitutional:  healthy, alert and no distress  Neuro: Alert and Oriented x 3, no focal defects.  Psych: Affect normal   Respiratory: Breathing not labored.  Cardiovascular: normal peripheral pulses  Lymph: no adenopathy  Skin: No rashes,worrisome lesions or skin problems  He has about 40 degrees of external rotation and 30 degrees internal rotation on the right hip.  Similar on the left.  The right is painful but mainly in the buttock.  He does have tenderness of the right SI joint and also some in the sciatic notch.  He had negative straight leg raise to 90 degrees.  Sensation, motor and circulation are intact.    Assessment:  right buttock pain.  It is not clear if this is hip or more sacroiliac and sciatic.  To help differentiate, we will refer to Dr. Medina for right hip injection.  I have told him to test activities that would be known to cause pain in the first 2 hours afterwards to see if pain is gone or still present.

## 2023-09-19 NOTE — LETTER
9/19/2023         RE: Amanuel Mccord  [address]  [facility] [address]        Dear Colleague,    Thank you for referring your patient, Amanuel Mccord, to the [facility]. Please see a copy of my visit note below.    Amanuel Mccord is a 63 year old male who is seen as self referral for right hip pain.  He feels this primarily in the buttock and near the SI joint.  He has had this for the past year and a half.  He has previously had left total hip arthroplasty through [facility].  That pain was in the groin, but he was thinking this was hip also.  He describes sharp burning pain in the buttock rated 10 out of 10.  Doing anything physical bothers it.    X-ray shows good joint space on the right hip on most of the joint.  There is a far lateral calcified labrum that is getting closer to contact with the femoral head.  Left total hip arthroplasty is in good position.    Past Medical History:   Diagnosis Date     PONV (postoperative nausea and vomiting)        Past Surgical History:   Procedure Laterality Date     ARTHROSCOPY SHOULDER DECOMPRESSION Right 2/24/2020    Procedure: subacromial decompression with partial acromioplasty;  Surgeon: Hong Edmond DO;  [facility]     ARTHROSCOPY SHOULDER DISTAL CLAVICLE REPAIR Right 2/24/2020    Procedure: distal clavicle excision;  Surgeon: Hong Edmond DO;  [facility]     ARTHROSCOPY SHOULDER ROTATOR CUFF REPAIR Right 2/24/2020    Procedure: right shoulder arthroscopy with rotator cuff repair and labral debridement;  Surgeon: Hong Edmond DO;  Location: [facility]       Family History   Problem Relation Age of Onset     Hypertension Mother      Hyperlipidemia Mother      Hyperlipidemia Father      Hypertension Father      Diabetes Brother      Diabetes Sister        Social History     Socioeconomic History     Marital status:      Spouse name: Not on file     Number of children: Not on file      Years of education: Not on file     Highest education level: Not on file   Occupational History     Not on file   Tobacco Use     Smoking status: Some Days     Types: Cigars     Smokeless tobacco: Never   Substance and Sexual Activity     Alcohol use: Yes     Drug use: No     Sexual activity: Yes   Other Topics Concern     Parent/sibling w/ CABG, MI or angioplasty before 65F 55M? Not Asked   Social History Narrative     Not on file     Social Determinants of Health     Financial Resource Strain: Not on file   Food Insecurity: Not on file   Transportation Needs: Not on file   Physical Activity: Not on file   Stress: Not on file   Social Connections: Not on file   Intimate Partner Violence: Not on file   Housing Stability: Not on file       Current Outpatient Medications   Medication Sig Dispense Refill     acetaminophen (TYLENOL) 325 MG tablet Take 3 tablets (975 mg) by mouth every 8 hours as needed for mild pain 50 tablet 1     amLODIPine-benazepril (LOTREL) 5-20 MG capsule Take 1 capsule by mouth daily 90 capsule 3     atorvastatin (LIPITOR) 80 MG tablet Take 1 tablet (80 mg) by mouth every morning 90 tablet 3     glimepiride (AMARYL) 1 MG tablet Take 1 tablet (1 mg) by mouth daily 90 tablet 3     methocarbamol (ROBAXIN) 500 MG tablet Take 1 tablet (500 mg) by mouth 3 times daily as needed for muscle spasms (Patient not taking: Reported on 9/8/2023) 20 tablet 0     sitagliptin-metFORMIN (JANUMET)  MG tablet TAKE ONE TABLET BY MOUTH TWO TIMES A DAY WITH MEALS 180 tablet 3       No Known Allergies    REVIEW OF SYSTEMS:  CONSTITUTIONAL:  NEGATIVE for fever, chills, change in weight, not feeling tired  SKIN:  NEGATIVE for worrisome rashes, no skin lumps, no skin ulcers and no non-healing wounds  EYES:  NEGATIVE for vision changes or irritation.  ENT/MOUTH:  NEGATIVE.  No hearing loss, no hoarseness, no difficulty swallowing.  RESP:  NEGATIVE. No cough or shortness of breath.  CV:  NEGATIVE for chest pain,  palpitations or peripheral edema  GI:  NEGATIVE for nausea, abdominal pain, heartburn, or change in bowel habits  :  Negative. No dysuria, no hematuria  MUSCULOSKELETAL:  See HPI above  NEURO:  NEGATIVE . No headaches, no dizziness,  no numbness  ENDOCRINE:  NEGATIVE for temperature intolerance, skin/hair changes  HEME/ALLERGY/IMMUNE:  NEGATIVE for bleeding problems  PSYCHIATRIC:  NEGATIVE. no anxiety, no depression.     Exam:  Vitals: BP (!) 168/99   Pulse 90   Resp 18   Ht 1.829 m (6')   Wt 87.1 kg (192 lb)   BMI 26.04 kg/m    BMI= Body mass index is 26.04 kg/m .  Constitutional:  healthy, alert and no distress  Neuro: Alert and Oriented x 3, no focal defects.  Psych: Affect normal   Respiratory: Breathing not labored.  Cardiovascular: normal peripheral pulses  Lymph: no adenopathy  Skin: No rashes,worrisome lesions or skin problems  He has about 40 degrees of external rotation and 30 degrees internal rotation on the right hip.  Similar on the left.  The right is painful but mainly in the buttock.  He does have tenderness of the right SI joint and also some in the sciatic notch.  He had negative straight leg raise to 90 degrees.  Sensation, motor and circulation are intact.    Assessment:  right buttock pain.  It is not clear if this is hip or more sacroiliac and sciatic.  To help differentiate, we will refer to Dr. Medina for right hip injection.  I have told him to test activities that would be known to cause pain in the first 2 hours afterwards to see if pain is gone or still present.    Again, thank you for allowing me to participate in the care of your patient.        Sincerely,        Mohinder Coleman MD

## 2023-09-20 NOTE — PROGRESS NOTES
Amanuel Mccord  :  1960  DOS: 2023  MRN: [id number]    Sports Medicine Clinic Procedure    Ultrasound Guided Right Intra-Articular Hip Injection    Clinical History: Primary osteoarthritis of the right hip.    Diagnosis:   1. Primary osteoarthritis of right hip      Referring Physician: Dr. Mohinder Coleman    Technique:   Right intraarticular Hip Injection - Ultrasound Guided  The patient was informed of the risks and the benefits of the procedure and a written consent was signed.  The patient s right hip was prepped with chlorhexidine in sterile fashion.   Local anesthesia was performed using a 25-gauge 1.5-inch needle to administer 3 mL of 1% lidocaine without epinephrine.  1 mL (40 mg/mL) of triamcinolone suspension was drawn up into a 10 mL syringe with 3 mL of 1% lidocaine and 3 mL of 0.5% bupivacaine.   Injection was performed using sterile technique.  Under ultrasound guidance a 3.5-inch 22-gauge needle was used to enter the right femoracetabular joint.  Anterior approach was used, needle placement was visualized and documented with ultrasound.  Ultrasound visualization was necessary due to decreased joint space in the setting of osteoarthritis.  Injection performed in-plane to the probe.  Injection solution visualized within the joint space.  Images were permanently stored for the patient's record.  There were no complications. The patient tolerated the procedure well. There was negligible bleeding.     Large Joint Injection/Arthocentesis: R hip joint    Date/Time: 2023 10:50 AM    Performed by: Benny Medina DO  Authorized by: Benny Medina DO    Indications:  Pain  Needle Size:  22 G  Guidance: ultrasound    Approach:  Anterior  Location:  Hip      Site:  R hip joint  Medications:  40 mg triamcinolone 40 MG/ML; 3 mL lidocaine 1 %; 3 mL BUPivacaine 0.5 %  Outcome:  Tolerated well, no immediate complications  Procedure discussed: discussed risks, benefits, and alternatives    Consent  Given by:  Patient  Prep: patient was prepped and draped in usual sterile fashion     3 mL 1% Lidocaine used for anesthetic     Ultrasound images of procedure were permanently stored.         Impression:  Successful Right intra-articular hip injection.    Plan:  Follow up with Dr. Coleman for next steps in treatment plan.  Expectations and goals of CSI reviewed  Often 2-3 days for steroid effect, and can take up to two weeks for maximum effect  We discussed modified progressive pain-free activity as tolerated  Do not overuse in first two weeks if feeling better due to concern for vulnerability while steroid is working  Supportive care reviewed  All questions were answered today  Contact us with additional questions or concerns  Signs and sx of concern reviewed      Benny Medina DO, JEFFERSON  [facility] - Sports Medicine  [facility] - Department of Orthopedic Surgery

## 2023-09-21 ENCOUNTER — OFFICE VISIT (OUTPATIENT)
Dept: ORTHOPEDICS | Facility: CLINIC | Age: 63
End: 2023-09-21
Attending: ORTHOPAEDIC SURGERY
Payer: COMMERCIAL

## 2023-09-21 DIAGNOSIS — M16.11 PRIMARY OSTEOARTHRITIS OF RIGHT HIP: Primary | ICD-10-CM

## 2023-09-21 PROCEDURE — 20611 DRAIN/INJ JOINT/BURSA W/US: CPT | Mod: RT | Performed by: STUDENT IN AN ORGANIZED HEALTH CARE EDUCATION/TRAINING PROGRAM

## 2023-09-21 PROCEDURE — 99207 PR DROP WITH A PROCEDURE: CPT | Mod: 25 | Performed by: STUDENT IN AN ORGANIZED HEALTH CARE EDUCATION/TRAINING PROGRAM

## 2023-09-21 RX ADMIN — TRIAMCINOLONE ACETONIDE 40 MG: 40 INJECTION, SUSPENSION INTRA-ARTICULAR; INTRAMUSCULAR at 10:50

## 2023-09-21 RX ADMIN — LIDOCAINE HYDROCHLORIDE 3 ML: 10 INJECTION, SOLUTION INFILTRATION; PERINEURAL at 10:50

## 2023-09-21 RX ADMIN — BUPIVACAINE HYDROCHLORIDE 3 ML: 5 INJECTION, SOLUTION PERINEURAL at 10:50

## 2023-09-21 NOTE — LETTER
2023         RE: Amanuel Mccord  [address]  [place] [facility] [place] [address]        Dear Colleague,    Thank you for referring your patient, Amanuel Mccord, to the [facility] [place]. Please see a copy of my visit note below.    Amanuel Mccord  :  1960  DOS: 2023  MRN: [id number]    Sports Medicine Clinic Procedure    Ultrasound Guided Right Intra-Articular Hip Injection    Clinical History: Primary osteoarthritis of the right hip.    Diagnosis:   1. Primary osteoarthritis of right hip      Referring Physician: Dr. Mohinder Coleman    Technique:   Right intraarticular Hip Injection - Ultrasound Guided  The patient was informed of the risks and the benefits of the procedure and a written consent was signed.  The patient s right hip was prepped with chlorhexidine in sterile fashion.   Local anesthesia was performed using a 25-gauge 1.5-inch needle to administer 3 mL of 1% lidocaine without epinephrine.  1 mL (40 mg/mL) of triamcinolone suspension was drawn up into a 10 mL syringe with 3 mL of 1% lidocaine and 3 mL of 0.5% bupivacaine.   Injection was performed using sterile technique.  Under ultrasound guidance a 3.5-inch 22-gauge needle was used to enter the right femoracetabular joint.  Anterior approach was used, needle placement was visualized and documented with ultrasound.  Ultrasound visualization was necessary due to decreased joint space in the setting of osteoarthritis.  Injection performed in-plane to the probe.  Injection solution visualized within the joint space.  Images were permanently stored for the patient's record.  There were no complications. The patient tolerated the procedure well. There was negligible bleeding.     Large Joint Injection/Arthocentesis: R hip joint    Date/Time: 2023 10:50 AM    Performed by: Benny Medina DO  Authorized by: Benny Medina DO    Indications:  Pain  Needle Size:  22 G  Guidance: ultrasound     Approach:  Anterior  Location:  Hip      Site:  R hip joint  Medications:  40 mg triamcinolone 40 MG/ML; 3 mL lidocaine 1 %; 3 mL BUPivacaine 0.5 %  Outcome:  Tolerated well, no immediate complications  Procedure discussed: discussed risks, benefits, and alternatives    Consent Given by:  Patient  Prep: patient was prepped and draped in usual sterile fashion     3 mL 1% Lidocaine used for anesthetic     Ultrasound images of procedure were permanently stored.         Impression:  Successful Right intra-articular hip injection.    Plan:  Follow up with Dr. Coleman for next steps in treatment plan.  Expectations and goals of CSI reviewed  Often 2-3 days for steroid effect, and can take up to two weeks for maximum effect  We discussed modified progressive pain-free activity as tolerated  Do not overuse in first two weeks if feeling better due to concern for vulnerability while steroid is working  Supportive care reviewed  All questions were answered today  Contact us with additional questions or concerns  Signs and sx of concern reviewed      Benny Medina DO, CAQSM  [facility] Sports Medicine  [facility] - Department of Orthopedic Surgery           Again, thank you for allowing me to participate in the care of your patient.        Sincerely,        Benny Medina DO

## 2023-09-29 RX ORDER — TRIAMCINOLONE ACETONIDE 40 MG/ML
40 INJECTION, SUSPENSION INTRA-ARTICULAR; INTRAMUSCULAR
Status: SHIPPED | OUTPATIENT
Start: 2023-09-21

## 2023-09-29 RX ORDER — LIDOCAINE HYDROCHLORIDE 10 MG/ML
3 INJECTION, SOLUTION INFILTRATION; PERINEURAL
Status: SHIPPED | OUTPATIENT
Start: 2023-09-21

## 2023-09-29 RX ORDER — BUPIVACAINE HYDROCHLORIDE 5 MG/ML
3 INJECTION, SOLUTION PERINEURAL
Status: SHIPPED | OUTPATIENT
Start: 2023-09-21

## 2023-10-05 ENCOUNTER — OFFICE VISIT (OUTPATIENT)
Dept: ORTHOPEDICS | Facility: CLINIC | Age: 63
End: 2023-10-05
Payer: COMMERCIAL

## 2023-10-05 VITALS
BODY MASS INDEX: 25.73 KG/M2 | SYSTOLIC BLOOD PRESSURE: 152 MMHG | WEIGHT: 190 LBS | HEART RATE: 77 BPM | HEIGHT: 72 IN | DIASTOLIC BLOOD PRESSURE: 93 MMHG | RESPIRATION RATE: 18 BRPM

## 2023-10-05 DIAGNOSIS — M16.11 PRIMARY OSTEOARTHRITIS OF RIGHT HIP: Primary | ICD-10-CM

## 2023-10-05 DIAGNOSIS — M46.1 SACROILIAC INFLAMMATION (H): ICD-10-CM

## 2023-10-05 PROCEDURE — 99213 OFFICE O/P EST LOW 20 MIN: CPT | Performed by: ORTHOPAEDIC SURGERY

## 2023-10-05 NOTE — LETTER
10/5/2023         RE: Amanuel Mccord  [address]  [place] [address]        Dear Colleague,    Thank you for referring your patient, Amanuel Mccord, to the [facility]. Please see a copy of my visit note below.    Amanuel Mccord is a 63 year old male who is seen as self referral for right hip pain.  He feels this primarily in the buttock and near the SI joint.  He has had this for the past year and a half.  He has previously had left total hip arthroplasty through [facility].  That pain was in the groin and resolved with total hip arthroplasty.  Despite being in  buttock, he thinks this is  hip also.  He describes sharp burning pain in the buttock rated 10 out of 10.  Doing anything physical bothers it.    X-ray shows good joint space on the right hip on most of the joint.  There is a far lateral calcified labrum that is getting closer to contact with the femoral head.  Left total hip arthroplasty is in good position.    Since it was not clear that this was hip, I suggested he have steroid injection in hip and then do provocative activities for the first 2 hours after the injection to see if pain is relieved.  He received injection, but was instructed not to do aggressive things right away. Thus he rested.  He does not feel the injection helped much at all.  If we trust this result, the pain is not coming from his hip.  He still insists it must be the hip.    Past Medical History:   Diagnosis Date     PONV (postoperative nausea and vomiting)        Past Surgical History:   Procedure Laterality Date     ARTHROSCOPY SHOULDER DECOMPRESSION Right 2/24/2020    Procedure: subacromial decompression with partial acromioplasty;  Surgeon: Hong Edmond DO;  Location: [facility]     ARTHROSCOPY SHOULDER DISTAL CLAVICLE REPAIR Right 2/24/2020    Procedure: distal clavicle excision;  Surgeon: Hong Edmond DO;  Location: [facility]     ARTHROSCOPY SHOULDER ROTATOR CUFF  REPAIR Right 2/24/2020    Procedure: right shoulder arthroscopy with rotator cuff repair and labral debridement;  Surgeon: Hong Edmond DO;  Location: [facility] OR       Family History   Problem Relation Age of Onset     Hypertension Mother      Hyperlipidemia Mother      Hyperlipidemia Father      Hypertension Father      Diabetes Brother      Diabetes Sister        Social History     Socioeconomic History     Marital status:      Spouse name: Not on file     Number of children: Not on file     Years of education: Not on file     Highest education level: Not on file   Occupational History     Not on file   Tobacco Use     Smoking status: Some Days     Types: Cigars     Smokeless tobacco: Never   Substance and Sexual Activity     Alcohol use: Yes     Drug use: No     Sexual activity: Yes   Other Topics Concern     Parent/sibling w/ CABG, MI or angioplasty before 65F 55M? Not Asked   Social History Narrative     Not on file     Social Determinants of Health     Financial Resource Strain: Not on file   Food Insecurity: Not on file   Transportation Needs: Not on file   Physical Activity: Not on file   Stress: Not on file   Social Connections: Not on file   Interpersonal Safety: Not on file   Housing Stability: Not on file       Current Outpatient Medications   Medication Sig Dispense Refill     acetaminophen (TYLENOL) 325 MG tablet Take 3 tablets (975 mg) by mouth every 8 hours as needed for mild pain 50 tablet 1     amLODIPine-benazepril (LOTREL) 5-20 MG capsule Take 1 capsule by mouth daily 90 capsule 3     atorvastatin (LIPITOR) 80 MG tablet Take 1 tablet (80 mg) by mouth every morning 90 tablet 3     glimepiride (AMARYL) 1 MG tablet Take 1 tablet (1 mg) by mouth daily 90 tablet 3     methocarbamol (ROBAXIN) 500 MG tablet Take 1 tablet (500 mg) by mouth 3 times daily as needed for muscle spasms (Patient not taking: Reported on 9/8/2023) 20 tablet 0     sitagliptin-metFORMIN (JANUMET)  MG tablet  TAKE ONE TABLET BY MOUTH TWO TIMES A DAY WITH MEALS 180 tablet 3       No Known Allergies    REVIEW OF SYSTEMS:  CONSTITUTIONAL:  NEGATIVE for fever, chills, change in weight, not feeling tired  SKIN:  NEGATIVE for worrisome rashes, no skin lumps, no skin ulcers and no non-healing wounds  EYES:  NEGATIVE for vision changes or irritation.  ENT/MOUTH:  NEGATIVE.  No hearing loss, no hoarseness, no difficulty swallowing.  RESP:  NEGATIVE. No cough or shortness of breath.  CV:  NEGATIVE for chest pain, palpitations or peripheral edema  GI:  NEGATIVE for nausea, abdominal pain, heartburn, or change in bowel habits  :  Negative. No dysuria, no hematuria  MUSCULOSKELETAL:  See HPI above  NEURO:  NEGATIVE . No headaches, no dizziness,  no numbness  ENDOCRINE:  NEGATIVE for temperature intolerance, skin/hair changes  HEME/ALLERGY/IMMUNE:  NEGATIVE for bleeding problems  PSYCHIATRIC:  NEGATIVE. no anxiety, no depression.     Exam:  Vitals: BP (!) 152/93   Pulse 77   Resp 18   Ht 1.829 m (6')   Wt 86.2 kg (190 lb)   BMI 25.77 kg/m    BMI= Body mass index is 25.77 kg/m .  Constitutional:  healthy, alert and no distress  Neuro: Alert and Oriented x 3, no focal defects.  Psych: Affect normal   Respiratory: Breathing not labored.  Cardiovascular: normal peripheral pulses  Lymph: no adenopathy  Skin: No rashes,worrisome lesions or skin problems  He has about 40 degrees of external rotation and 30 degrees internal rotation on the right hip.  Similar on the left.  The right is painful but mainly in the buttock.  He does have tenderness of the right SI joint and also some in the sciatic notch.  He had negative straight leg raise to 90 degrees.  Sensation, motor and circulation are intact.    Assessment:  right buttock pain.  It is not clear if this is hip or more sacroiliac and sciatic.  With lack of improvement with a hip joint injection, I am even more doubtful of the hip being the source of pain.  He is frustrated with  this.  I suggested either a spine evaluation  starting with his primary physician to prepare to see spine, or to get a second opinion.  If he wanted to see Dr. Sameer Valdivia he would have operative time 1-2 months before I would be available, since I am scheduling in January.    Again, thank you for allowing me to participate in the care of your patient.        Sincerely,        Mohinder Coleman MD

## 2023-10-05 NOTE — PATIENT INSTRUCTIONS
Buttock pain usually is not from hip.  Lack of significant improvement initially with hip injection makes us doubt the hip is the source of pain.  Maybe a spine evaluation would be helpful.  Another option would be getting second opinion with Dr Sameer Valdivia to see if he thinks you would benefit from total hip arthroplasty.

## 2023-10-06 NOTE — PROGRESS NOTES
Amanuel Mccord is a 63 year old male who is seen as self referral for right hip pain.  He feels this primarily in the buttock and near the SI joint.  He has had this for the past year and a half.  He has previously had left total hip arthroplasty through [facility].  That pain was in the groin and resolved with total hip arthroplasty.  Despite being in  buttock, he thinks this is  hip also.  He describes sharp burning pain in the buttock rated 10 out of 10.  Doing anything physical bothers it.    X-ray shows good joint space on the right hip on most of the joint.  There is a far lateral calcified labrum that is getting closer to contact with the femoral head.  Left total hip arthroplasty is in good position.    Since it was not clear that this was hip, I suggested he have steroid injection in hip and then do provocative activities for the first 2 hours after the injection to see if pain is relieved.  He received injection, but was instructed not to do aggressive things right away. Thus he rested.  He does not feel the injection helped much at all.  If we trust this result, the pain is not coming from his hip.  He still insists it must be the hip.    Past Medical History:   Diagnosis Date    PONV (postoperative nausea and vomiting)        Past Surgical History:   Procedure Laterality Date    ARTHROSCOPY SHOULDER DECOMPRESSION Right 2/24/2020    Procedure: subacromial decompression with partial acromioplasty;  Surgeon: Hong Edmond DO;  [facility]: [facility]    ARTHROSCOPY SHOULDER DISTAL CLAVICLE REPAIR Right 2/24/2020    Procedure: distal clavicle excision;  Surgeon: Hong Edmond DO;  [facility]    ARTHROSCOPY SHOULDER ROTATOR CUFF REPAIR Right 2/24/2020    Procedure: right shoulder arthroscopy with rotator cuff repair and labral debridement;  Surgeon: Hong Edmond DO;  Location: [facility]       Family History   Problem Relation Age of Onset    Hypertension Mother     Hyperlipidemia  Mother     Hyperlipidemia Father     Hypertension Father     Diabetes Brother     Diabetes Sister        Social History     Socioeconomic History    Marital status:      Spouse name: Not on file    Number of children: Not on file    Years of education: Not on file    Highest education level: Not on file   Occupational History    Not on file   Tobacco Use    Smoking status: Some Days     Types: Cigars    Smokeless tobacco: Never   Substance and Sexual Activity    Alcohol use: Yes    Drug use: No    Sexual activity: Yes   Other Topics Concern    Parent/sibling w/ CABG, MI or angioplasty before 65F 55M? Not Asked   Social History Narrative    Not on file     Social Determinants of Health     Financial Resource Strain: Not on file   Food Insecurity: Not on file   Transportation Needs: Not on file   Physical Activity: Not on file   Stress: Not on file   Social Connections: Not on file   Interpersonal Safety: Not on file   Housing Stability: Not on file       Current Outpatient Medications   Medication Sig Dispense Refill    acetaminophen (TYLENOL) 325 MG tablet Take 3 tablets (975 mg) by mouth every 8 hours as needed for mild pain 50 tablet 1    amLODIPine-benazepril (LOTREL) 5-20 MG capsule Take 1 capsule by mouth daily 90 capsule 3    atorvastatin (LIPITOR) 80 MG tablet Take 1 tablet (80 mg) by mouth every morning 90 tablet 3    glimepiride (AMARYL) 1 MG tablet Take 1 tablet (1 mg) by mouth daily 90 tablet 3    methocarbamol (ROBAXIN) 500 MG tablet Take 1 tablet (500 mg) by mouth 3 times daily as needed for muscle spasms (Patient not taking: Reported on 9/8/2023) 20 tablet 0    sitagliptin-metFORMIN (JANUMET)  MG tablet TAKE ONE TABLET BY MOUTH TWO TIMES A DAY WITH MEALS 180 tablet 3       No Known Allergies    REVIEW OF SYSTEMS:  CONSTITUTIONAL:  NEGATIVE for fever, chills, change in weight, not feeling tired  SKIN:  NEGATIVE for worrisome rashes, no skin lumps, no skin ulcers and no non-healing  wounds  EYES:  NEGATIVE for vision changes or irritation.  ENT/MOUTH:  NEGATIVE.  No hearing loss, no hoarseness, no difficulty swallowing.  RESP:  NEGATIVE. No cough or shortness of breath.  CV:  NEGATIVE for chest pain, palpitations or peripheral edema  GI:  NEGATIVE for nausea, abdominal pain, heartburn, or change in bowel habits  :  Negative. No dysuria, no hematuria  MUSCULOSKELETAL:  See HPI above  NEURO:  NEGATIVE . No headaches, no dizziness,  no numbness  ENDOCRINE:  NEGATIVE for temperature intolerance, skin/hair changes  HEME/ALLERGY/IMMUNE:  NEGATIVE for bleeding problems  PSYCHIATRIC:  NEGATIVE. no anxiety, no depression.     Exam:  Vitals: BP (!) 152/93   Pulse 77   Resp 18   Ht 1.829 m (6')   Wt 86.2 kg (190 lb)   BMI 25.77 kg/m    BMI= Body mass index is 25.77 kg/m .  Constitutional:  healthy, alert and no distress  Neuro: Alert and Oriented x 3, no focal defects.  Psych: Affect normal   Respiratory: Breathing not labored.  Cardiovascular: normal peripheral pulses  Lymph: no adenopathy  Skin: No rashes,worrisome lesions or skin problems  He has about 40 degrees of external rotation and 30 degrees internal rotation on the right hip.  Similar on the left.  The right is painful but mainly in the buttock.  He does have tenderness of the right SI joint and also some in the sciatic notch.  He had negative straight leg raise to 90 degrees.  Sensation, motor and circulation are intact.    Assessment:  right buttock pain.  It is not clear if this is hip or more sacroiliac and sciatic.  With lack of improvement with a hip joint injection, I am even more doubtful of the hip being the source of pain.  He is frustrated with this.  I suggested either a spine evaluation  starting with his primary physician to prepare to see spine, or to get a second opinion.  If he wanted to see Dr. Sameer Valdivia he would have operative time 1-2 months before I would be available, since I am scheduling in January.

## 2024-08-03 ENCOUNTER — TRANSFERRED RECORDS (OUTPATIENT)
Dept: MULTI SPECIALTY CLINIC | Facility: CLINIC | Age: 64
End: 2024-08-03

## 2024-08-03 LAB — RETINOPATHY: NORMAL

## 2024-12-30 DIAGNOSIS — I10 BENIGN ESSENTIAL HYPERTENSION: ICD-10-CM

## 2024-12-30 DIAGNOSIS — E11.9 TYPE 2 DIABETES MELLITUS WITHOUT COMPLICATION, WITHOUT LONG-TERM CURRENT USE OF INSULIN (H): Primary | ICD-10-CM

## 2024-12-30 NOTE — TELEPHONE ENCOUNTER
Clinic RN: Please investigate patient's chart or contact patient if the information cannot be found because for all 3 pending meds- patient should have run out of this medication on 9/5/24. Confirm patient is taking this medication as prescribed. Document findings and route refill encounter to provider for approval or denial.    Gina Hudson, WENDYN, RN

## 2024-12-31 NOTE — TELEPHONE ENCOUNTER
RN TRIAGE CALL:    Patient Contact    Attempt # 1    Was call answered?  No.  Left message on voicemail with information to call me back.    WENDY HickmanN, RN

## 2025-01-02 RX ORDER — GLIMEPIRIDE 1 MG/1
1 TABLET ORAL DAILY
Qty: 90 TABLET | Refills: 3 | Status: SHIPPED | OUTPATIENT
Start: 2025-01-02

## 2025-01-02 RX ORDER — ATORVASTATIN CALCIUM 80 MG/1
80 TABLET, FILM COATED ORAL EVERY MORNING
Qty: 90 TABLET | Refills: 3 | Status: SHIPPED | OUTPATIENT
Start: 2025-01-02

## 2025-01-02 RX ORDER — AMLODIPINE AND BENAZEPRIL HYDROCHLORIDE 5; 20 MG/1; MG/1
1 CAPSULE ORAL DAILY
Qty: 90 CAPSULE | Refills: 3 | Status: SHIPPED | OUTPATIENT
Start: 2025-01-02

## 2025-01-02 NOTE — TELEPHONE ENCOUNTER
Patient called back, requesting short fill to get him through to his appointment next Friday.     Appointments in Next Year      Juan Diego 10, 2025 3:30 PM  (Arrive by 3:10 PM)  Adult Preventative Visit with Gregory G Schoen, MD  [facility] ([facility] ) [phone]       Josefina Layne RN, BSN

## 2025-01-02 NOTE — TELEPHONE ENCOUNTER
Patient called back and wanted to get scheduled for his Annual visit with provider. Scheduled for next week 01/10/2025

## 2025-01-10 ENCOUNTER — TELEPHONE (OUTPATIENT)
Dept: FAMILY MEDICINE | Facility: CLINIC | Age: 65
End: 2025-01-10

## 2025-01-10 ENCOUNTER — OFFICE VISIT (OUTPATIENT)
Dept: FAMILY MEDICINE | Facility: CLINIC | Age: 65
End: 2025-01-10
Payer: COMMERCIAL

## 2025-01-10 VITALS
TEMPERATURE: 97.9 F | SYSTOLIC BLOOD PRESSURE: 144 MMHG | HEIGHT: 72 IN | DIASTOLIC BLOOD PRESSURE: 78 MMHG | HEART RATE: 95 BPM | BODY MASS INDEX: 28.17 KG/M2 | OXYGEN SATURATION: 97 % | RESPIRATION RATE: 18 BRPM | WEIGHT: 208 LBS

## 2025-01-10 DIAGNOSIS — E78.5 HYPERLIPIDEMIA LDL GOAL <100: ICD-10-CM

## 2025-01-10 DIAGNOSIS — Z12.5 SCREENING FOR PROSTATE CANCER: ICD-10-CM

## 2025-01-10 DIAGNOSIS — M15.0 PRIMARY OSTEOARTHRITIS INVOLVING MULTIPLE JOINTS: ICD-10-CM

## 2025-01-10 DIAGNOSIS — E11.9 TYPE 2 DIABETES MELLITUS WITHOUT COMPLICATION, WITHOUT LONG-TERM CURRENT USE OF INSULIN (H): Primary | ICD-10-CM

## 2025-01-10 DIAGNOSIS — E87.1 HYPONATREMIA: Primary | ICD-10-CM

## 2025-01-10 DIAGNOSIS — H50.9 STRABISMUS: ICD-10-CM

## 2025-01-10 DIAGNOSIS — Z12.11 SCREEN FOR COLON CANCER: ICD-10-CM

## 2025-01-10 DIAGNOSIS — I10 BENIGN ESSENTIAL HYPERTENSION: ICD-10-CM

## 2025-01-10 DIAGNOSIS — E87.1 HYPONATREMIA: ICD-10-CM

## 2025-01-10 LAB
ANION GAP SERPL CALCULATED.3IONS-SCNC: <1 MMOL/L (ref 7–15)
BUN SERPL-MCNC: 15.4 MG/DL (ref 8–23)
CALCIUM SERPL-MCNC: 9.5 MG/DL (ref 8.8–10.4)
CHLORIDE SERPL-SCNC: 118 MMOL/L (ref 98–107)
CHOLEST SERPL-MCNC: 179 MG/DL
CREAT SERPL-MCNC: 0.78 MG/DL (ref 0.67–1.17)
CREAT UR-MCNC: 57.7 MG/DL
EGFRCR SERPLBLD CKD-EPI 2021: >90 ML/MIN/1.73M2
EST. AVERAGE GLUCOSE BLD GHB EST-MCNC: 192 MG/DL
FASTING STATUS PATIENT QL REPORTED: YES
FASTING STATUS PATIENT QL REPORTED: YES
GLUCOSE SERPL-MCNC: 104 MG/DL (ref 70–99)
HBA1C MFR BLD: 8.3 %
HCO3 SERPL-SCNC: 26 MMOL/L (ref 22–29)
HDLC SERPL-MCNC: 73 MG/DL
LDLC SERPL CALC-MCNC: 79 MG/DL
MICROALBUMIN UR-MCNC: 153.3 MG/L
MICROALBUMIN/CREAT UR: 265.68 MG/G CR (ref 0–17)
NONHDLC SERPL-MCNC: 106 MG/DL
POTASSIUM SERPL-SCNC: 4 MMOL/L (ref 3.4–5.3)
PSA SERPL DL<=0.01 NG/ML-MCNC: 3.74 NG/ML (ref 0–4.5)
SODIUM SERPL-SCNC: 119 MMOL/L (ref 135–145)
TRIGL SERPL-MCNC: 137 MG/DL
TSH SERPL DL<=0.005 MIU/L-ACNC: 1.99 UIU/ML (ref 0.3–4.2)

## 2025-01-10 PROCEDURE — 84443 ASSAY THYROID STIM HORMONE: CPT | Performed by: FAMILY MEDICINE

## 2025-01-10 PROCEDURE — 83935 ASSAY OF URINE OSMOLALITY: CPT | Performed by: FAMILY MEDICINE

## 2025-01-10 PROCEDURE — 82570 ASSAY OF URINE CREATININE: CPT | Performed by: FAMILY MEDICINE

## 2025-01-10 PROCEDURE — 84300 ASSAY OF URINE SODIUM: CPT | Performed by: FAMILY MEDICINE

## 2025-01-10 PROCEDURE — 83036 HEMOGLOBIN GLYCOSYLATED A1C: CPT | Performed by: FAMILY MEDICINE

## 2025-01-10 PROCEDURE — 82436 ASSAY OF URINE CHLORIDE: CPT | Performed by: FAMILY MEDICINE

## 2025-01-10 PROCEDURE — 84460 ALANINE AMINO (ALT) (SGPT): CPT | Performed by: FAMILY MEDICINE

## 2025-01-10 PROCEDURE — G0103 PSA SCREENING: HCPCS | Performed by: FAMILY MEDICINE

## 2025-01-10 PROCEDURE — 80061 LIPID PANEL: CPT | Performed by: FAMILY MEDICINE

## 2025-01-10 PROCEDURE — 99214 OFFICE O/P EST MOD 30 MIN: CPT | Performed by: FAMILY MEDICINE

## 2025-01-10 PROCEDURE — 99207 PR FOOT EXAM NO CHARGE: CPT | Performed by: FAMILY MEDICINE

## 2025-01-10 PROCEDURE — 80048 BASIC METABOLIC PNL TOTAL CA: CPT | Performed by: FAMILY MEDICINE

## 2025-01-10 PROCEDURE — 82043 UR ALBUMIN QUANTITATIVE: CPT | Performed by: FAMILY MEDICINE

## 2025-01-10 PROCEDURE — G2211 COMPLEX E/M VISIT ADD ON: HCPCS | Performed by: FAMILY MEDICINE

## 2025-01-10 PROCEDURE — 36415 COLL VENOUS BLD VENIPUNCTURE: CPT | Performed by: FAMILY MEDICINE

## 2025-01-10 RX ORDER — SITAGLIPTIN AND METFORMIN HYDROCHLORIDE 1000; 50 MG/1; MG/1
TABLET, FILM COATED ORAL
Qty: 180 TABLET | Refills: 3 | Status: SHIPPED | OUTPATIENT
Start: 2025-01-10

## 2025-01-10 SDOH — HEALTH STABILITY: PHYSICAL HEALTH: ON AVERAGE, HOW MANY DAYS PER WEEK DO YOU ENGAGE IN MODERATE TO STRENUOUS EXERCISE (LIKE A BRISK WALK)?: 6 DAYS

## 2025-01-10 SDOH — HEALTH STABILITY: PHYSICAL HEALTH: ON AVERAGE, HOW MANY MINUTES DO YOU ENGAGE IN EXERCISE AT THIS LEVEL?: 60 MIN

## 2025-01-10 ASSESSMENT — SOCIAL DETERMINANTS OF HEALTH (SDOH): HOW OFTEN DO YOU GET TOGETHER WITH FRIENDS OR RELATIVES?: TWICE A WEEK

## 2025-01-10 ASSESSMENT — PAIN SCALES - GENERAL: PAINLEVEL_OUTOF10: NO PAIN (0)

## 2025-01-10 NOTE — TELEPHONE ENCOUNTER
Called about sodium being low at 119 today, no symptoms per note in chart, sugars were ok at 220 range, hgba1c at 8.3.      Tried to call patient but no answer.      Will try again.    Called 4 times, left a message .    Sodium is low, he should cut down fluid intake to concentrate sodium, ok to eat extra salt.  Any symptoms of confusion, sedation, seizure go to the ER.   Recheck labs on Saturday or Sunday.

## 2025-01-10 NOTE — PROGRESS NOTES
Assessment & Plan     Type 2 diabetes mellitus without complication, without long-term current use of insulin (H)  Due for labs today; will check basic profile, A1c, urine albumin, TSH, lipids.  Follow with patient based on results.      Benign essential hypertension  BP is slightly increased today; states he has been taking meds.  Need monitoring to determine if adjustment is really needed.  Last visits in 2023 were with elevated pressures as well.  On CCB/ACE-I combo for BP management (amlodipine 5/benazepril 20).  This could be adjusted to increase the amlodipine to a 10/20 dose.  Would like some follow up BP checks.  Also depends on lab results regarding potassium, renal function.       Hyperlipidemia LDL goal <100  On max dose of atorvastatin 80 mg and tolerating.  Check labs.    Primary osteoarthritis involving multiple joints  Simply wishes to bring awareness.  Did not assess todasy.     Strabismus of left eye  Poor vision. Protects right eye and gets frequent eye checks with ophthalmology.     Screen for colon cancer  Discussed options and he will consider Cologuard.      Screening for prostate cancer  Okay with checking PSA.                 Nicotine/Tobacco Cessation  He reports that he has been smoking cigars. He has never used smokeless tobacco.  Nicotine/Tobacco Cessation Plan  Information offered: Patient not interested at this time      BMI  Estimated body mass index is 28.21 kg/m  as calculated from the following:    Height as of this encounter: 1.829 m (6').    Weight as of this encounter: 94.3 kg (208 lb).       Counseling  Appropriate preventive services were addressed with this patient via screening, questionnaire, or discussion as appropriate for fall prevention, nutrition, physical activity, Tobacco-use cessation, social engagement, weight loss and cognition.  Checklist reviewing preventive services available has been given to the patient.  Reviewed patient's diet, addressing concerns and/or  questions.     Recommend recheck in 6 months if all is well, although patient is clear he intends to only follow up annually.    The longitudinal plan of care for the diagnosis(es)/condition(s) as documented were addressed during this visit. Due to the added complexity in care, I will continue to support Rommel in the subsequent management and with ongoing continuity of care.      Electronically signed by Greg Schoen, MD      ADDENDUM: 1/11/2025  10:30AM    Labs were resulted yesterday after I departed from clinic and patient had a critical sodium value of 119, but had elevated chloride, normal GFR, low anion gap, normal urine creatinine, normal thyroid.  A calculated effective osmolality using his average daily glucose of 192 still results in lacy well below 170, suggesting this is hypotonic hyponatremia in nature.  I do see that lab contacted Dr. Foley, on call physician last evening, who attempted to reach the patient and was unable.  As Rommel presented for a routine visit without associated symptoms and appeared clinically euvolemic, this critical value suggests potential for lab error as severely reduced salt intake would more often be associated with low chloride and his is high.  Will check urine osmolality, urine sodium and urine chloride on his specimen given in the clinic yesterday.  I did verify that lab did a recheck of serum sodium on his initial blood sample to ensure there wasn't lab error.  They cannot run a serum osmolality on the blood drawn yesterday as they only have a plasma tube and need serum. Pending the urine results, will attempt to reach patient and alter fluid intake accordingly if results suggest there is a volume situation or SIADH.  He is not on thiazide diuretics and has normal renal function and although hyperglycemic, certainly not to a degree that would result in pseudohyponatremia.  Since this is a chronic development, we can modify his salt intake (increase) and fluid intake  (decrease) to try to correct this without bringing in to manage in hospital setting with IV fluids and electrolytes.  Will discuss seizure risk with patient as well if able to reach him.     Electronically signed by Greg Schoen, MD                Subjective   Rommel is a 64 year old, presenting for the following health issues:  Follow Up (diabetes)      1/10/2025     3:21 PM   Additional Questions   Roomed by Dilia     Via the [employer] Maintenance questionnaire, the patient has reported the following services have been completed -Eye Exam: [place] 2024-08-03, this information has been sent to the abstraction team.  History of Present Illness       Diabetes:   He presents for follow up of diabetes.  He is checking home blood glucose a few times a week.   He checks blood glucose before meals.  Blood glucose is never over 200 and never under 70.  When his blood glucose is low, the patient is asymptomatic for confusion, blurred vision, lethargy and reports not feeling dizzy, shaky, or weak.   He has no concerns regarding his diabetes at this time.   He is not experiencing numbness or burning in feet, excessive thirst, blurry vision, weight changes or redness, sores or blisters on feet. The patient has not had a diabetic eye exam in the last 12 months.      He exercises with enough effort to increase his heart rate 20 to 29 minutes per day.  He exercises with enough effort to increase his heart rate 3 or less days per week.   He is taking medications regularly.     Rommel is here for follow up of his chronic medical issues and informed nurse he does not want a wellness exam or address care gaps.  He then repeated same to me, stating that he knows he should screen for colon cancer and is thinking about Cologuard as an option.  He has been doing PSA testing.  He also states that at some point he will consider getting vaccinations but not today.      States he is feeling well, keeps active at work and does a lot of outdoor  "activity.  He notes he is taking his meds as directed and has no concerns about his health, other than a lot of joint aches and pains.  Feels his diet is stable, mostly healthy with occasional deviations.  He has had some orthopedic care, essentially all of which he feels he had suboptimal outcome due to ineffective procedures or perhaps his own activities after surgery resulted in less then optimal outcome.  He says he can deal with it and no follow up desired at this time, \"no offense to the medical field.\"     Diabetes Follow-up    How often are you checking your blood sugar? A few times a week  What time of day are you checking your blood sugars (select all that apply)?  Before meals  Have you had any blood sugars above 200?  No  Have you had any blood sugars below 70?  No  What symptoms do you notice when your blood sugar is low?  None  What concerns do you have today about your diabetes? None   Do you have any of these symptoms? (Select all that apply)  No numbness or tingling in feet.  No redness, sores or blisters on feet.  No complaints of excessive thirst.  No reports of blurry vision.  No significant changes to weight.        Hyperlipidemia Follow-Up    Are you regularly taking any medication or supplement to lower your cholesterol?   Yes-    Are you having muscle aches or other side effects that you think could be caused by your cholesterol lowering medication?  No    Hypertension Follow-up    Do you check your blood pressure regularly outside of the clinic? No   Are you following a low salt diet? Yes  Are your blood pressures ever more than 140 on the top number (systolic) OR more   than 90 on the bottom number (diastolic), for example 140/90? N/A    Current Outpatient Medications   Medication Sig Dispense Refill    acetaminophen (TYLENOL) 325 MG tablet Take 3 tablets (975 mg) by mouth every 8 hours as needed for mild pain 50 tablet 1    amLODIPine-benazepril (LOTREL) 5-20 MG capsule TAKE ONE CAPSULE BY " "MOUTH ONCE DAILY 90 capsule 3    atorvastatin (LIPITOR) 80 MG tablet TAKE ONE TABLET BY MOUTH EVERY MORNING 90 tablet 3    glimepiride (AMARYL) 1 MG tablet TAKE ONE TABLET BY MOUTH ONCE DAILY 90 tablet 3    sitagliptin-metFORMIN (JANUMET)  MG tablet TAKE ONE TABLET BY MOUTH TWO TIMES A DAY WITH MEALS 180 tablet 3    methocarbamol (ROBAXIN) 500 MG tablet Take 1 tablet (500 mg) by mouth 3 times daily as needed for muscle spasms (Patient not taking: Reported on 1/10/2025) 20 tablet 0         BP Readings from Last 2 Encounters:   01/10/25 (!) 144/78   10/05/23 (!) 152/93     Hemoglobin A1C (%)   Date Value   09/08/2023 6.4 (H)   09/27/2022 6.0 (H)   07/06/2020 6.7 (H)   12/20/2019 6.7 (H)     LDL Cholesterol Calculated (mg/dL)   Date Value   09/08/2023 74   09/27/2022 47   12/20/2019 73   07/03/2018 63           Review of Systems  CONSTITUTIONAL: NEGATIVE for fever, chills, change in weight  INTEGUMENTARY/SKIN: NEGATIVE for worrisome rashes, moles or lesions; does describe raynauds changes in fingers with cold exposure.  Speaks of a lot of outdoor hockey as a child with frequent frostnip to hands and feet.    EYES: has 20/100 vision in left \"lazy eye\" and is legally blind.  Has been seeing ophthalmology on a regular basis to ensure he protects his right eye vision.  Always uses protective eyewear with working risks. ENT/MOUTH: NEGATIVE for ear, mouth and throat problems  RESP: NEGATIVE for significant cough or SOB  CV: NEGATIVE for chest pain, palpitations or peripheral edema  GI: NEGATIVE for nausea, abdominal pain, heartburn, or change in bowel habits  : NEGATIVE for frequency, dysuria, or hematuria  MUSCULOSKELETAL:See above regarding multiple joint weaknesses  NEURO: NEGATIVE for weakness, dizziness or paresthesias  ENDOCRINE: Feels his diabetes is well controlled but does not test often; taking meds as prescribed.    HEME: NEGATIVE for bleeding problems  PSYCHIATRIC: NEGATIVE for changes in mood or " affect      Objective    BP (!) 144/78   Pulse 95   Temp 97.9  F (36.6  C) (Temporal)   Resp 18   Ht 1.829 m (6')   Wt 94.3 kg (208 lb)   SpO2 97%   BMI 28.21 kg/m    Body mass index is 28.21 kg/m .  Physical Exam   GENERAL: alert and no distress  EYES: Eyes grossly normal to inspection, PERRL and conjunctivae and sclerae normal, fundi difficult to see as pupils are small. There is exotropia of his left eye.    HENT: ear canals and TM's normal, nose and mouth without ulcers or lesions  NECK: no adenopathy, no asymmetry, masses, or scars  RESP: lungs clear to auscultation - no rales, rhonchi or wheezes  CV: regular rate and rhythm, normal S1 S2, no S3 or S4, no murmur, click or rub, no peripheral edema  ABDOMEN: soft, nontender, no hepatosplenomegaly, no masses and bowel sounds normal  MS: no gross musculoskeletal defects noted, no edema  Foot exam: no skin lesions, normal pulses and capillary refill, normal monofilament exam.   SKIN: no suspicious lesions or rashes  NEURO: Normal strength and tone, mentation intact and speech normal  PSYCH: mentation appears normal, affect normal/bright            Signed Electronically by: Gregory G. Schoen, MD

## 2025-01-11 PROBLEM — M15.0 PRIMARY OSTEOARTHRITIS INVOLVING MULTIPLE JOINTS: Status: ACTIVE | Noted: 2025-01-11

## 2025-01-11 LAB
ALT SERPL W P-5'-P-CCNC: 35 U/L (ref 0–70)
CHLORIDE UR-SCNC: 50 MMOL/L
OSMOLALITY UR: 323 MMOL/KG (ref 100–1200)
SODIUM UR-SCNC: 47 MMOL/L

## 2025-01-11 NOTE — RESULT ENCOUNTER NOTE
Attempts were made last evening by Dr. Foley and today by me to reach Rommel and no answer was obtained.  He did identify himself and I did leave a message consistent with what Dr. Foley left him last night about adding salt to his food and cutting down on fluids.  Also has been warned about concerns for confusion/seizures, sedation and need to go to ER. Reiterated need to have follow up labs.  Spot urine sodium on blood yesterday was 47, with osmolality pending to help assess for SIADH.  Asked to come in for further blood work to confirm and check serum osmolality.   Electronically signed by Greg Schoen, MD

## 2025-01-13 ENCOUNTER — TELEPHONE (OUTPATIENT)
Dept: FAMILY MEDICINE | Facility: CLINIC | Age: 65
End: 2025-01-13
Payer: COMMERCIAL

## 2025-01-13 NOTE — TELEPHONE ENCOUNTER
Please inform no changes in meds at this time, but his diabetes is no longer well controlled and we need to make some changes.   Electronically signed by Greg Schoen, MD

## 2025-01-13 NOTE — TELEPHONE ENCOUNTER
Writer spoke with patient and relayed providers update. Verbalized understanding. No further questions.     Will wait for providers response before scheduling follow-up with PCP.     Félix Parra RN on 1/13/2025 at 11:28 AM

## 2025-01-13 NOTE — TELEPHONE ENCOUNTER
Please attempt to contact Rommel today to ensure he gets the message that we are concerned about his very low sodium and need him to come in for some blood work.  Please verify if he received messages from both me and Dr. Foley over the weekend to increase salt in his diet and come in for blood work.  There is potential for lab error but also may be real and is at risk for seizures with sodium levels this low.  He is not a big fan of coming to the clinic and was feeling fine when seen and this routine blood draw was done, so likely does not appreciate the potential seriousness of this.  I am happy to speak with him if we can get a reliable number and time to call him.  Greg Schoen, MD

## 2025-01-13 NOTE — TELEPHONE ENCOUNTER
Patient notified of lab results.    He will come in Wednesday for labs.  He is wondering if there are any medications he should stop before his labs.    He had company over the weekend.    He states he got his message and he states he feels fine.  He has increased his salt intake and has reduced the amount of water he is drinking.    He states Dr. Schoen can call him at his cell number if he has any more questions.    Jenny Shearer RN on 1/13/2025 at 10:47 AM

## 2025-01-15 ENCOUNTER — LAB (OUTPATIENT)
Dept: LAB | Facility: CLINIC | Age: 65
End: 2025-01-15
Payer: COMMERCIAL

## 2025-01-15 ENCOUNTER — TELEPHONE (OUTPATIENT)
Dept: FAMILY MEDICINE | Facility: CLINIC | Age: 65
End: 2025-01-15

## 2025-01-15 DIAGNOSIS — E87.1 HYPOSMOLALITY SYNDROME: Primary | ICD-10-CM

## 2025-01-15 DIAGNOSIS — E87.1 HYPONATREMIA: ICD-10-CM

## 2025-01-15 LAB
ANION GAP SERPL CALCULATED.3IONS-SCNC: 13 MMOL/L (ref 7–15)
BUN SERPL-MCNC: 19.2 MG/DL (ref 8–23)
CALCIUM SERPL-MCNC: 9.7 MG/DL (ref 8.8–10.4)
CHLORIDE SERPL-SCNC: 104 MMOL/L (ref 98–107)
CREAT SERPL-MCNC: 0.83 MG/DL (ref 0.67–1.17)
EGFRCR SERPLBLD CKD-EPI 2021: >90 ML/MIN/1.73M2
GLUCOSE SERPL-MCNC: 191 MG/DL (ref 70–99)
HCO3 SERPL-SCNC: 25 MMOL/L (ref 22–29)
OSMOLALITY SERPL: 307 MMOL/KG (ref 280–301)
POTASSIUM SERPL-SCNC: 4.4 MMOL/L (ref 3.4–5.3)
SODIUM SERPL-SCNC: 142 MMOL/L (ref 135–145)

## 2025-01-15 PROCEDURE — 36415 COLL VENOUS BLD VENIPUNCTURE: CPT

## 2025-01-15 PROCEDURE — 83930 ASSAY OF BLOOD OSMOLALITY: CPT

## 2025-01-15 PROCEDURE — 80048 BASIC METABOLIC PNL TOTAL CA: CPT

## 2025-01-15 NOTE — TELEPHONE ENCOUNTER
----- Message from Zoltan Foley sent at 1/15/2025 10:45 AM CST -----  Please let him know his sodium is back to normal, continue to work on better diabetes control

## 2025-01-16 ENCOUNTER — TELEPHONE (OUTPATIENT)
Dept: FAMILY MEDICINE | Facility: CLINIC | Age: 65
End: 2025-01-16
Payer: COMMERCIAL

## 2025-01-16 NOTE — RESULT ENCOUNTER NOTE
Please notify Rommel that the repeat blood work showed his labs to be fine.  We do need to discuss his A1c and diabetes management as that appears to no longer be in as good control as it has been.  If he has a glucose monitor, I would ask that he check sugars on even days in the AM before eating and before eating supper and on the odd days check before lunch and before bedtime.  Recording these and getting me a report in 3 weeks would be very helpful to know what would be best for a next step in treating.  Reinforce importance of adhering to a diabetic diet.  I can refer him for a refresher on that if he wishes.  Electronically signed by Greg Schoen, MD

## 2025-01-16 NOTE — TELEPHONE ENCOUNTER
----- Message from Gregory G. Schoen sent at 1/16/2025 12:09 AM CST -----  Please notify Rommel that the repeat blood work showed his labs to be fine.  We do need to discuss his A1c and diabetes management as that appears to no longer be in as good control as it has been.  If he has a glucose monitor, I would ask that he check sugars on even days in the AM before eating and before eating supper and on the odd days check before lunch and before bedtime.  Recording these and getting me a report in 3 weeks would be very helpful to know what would be best for a next step in treating.  Reinforce importance of adhering to a diabetic diet.  I can refer him for a refresher on that if he wishes.  Electronically signed by Greg Schoen, MD

## 2025-04-08 ENCOUNTER — TELEPHONE (OUTPATIENT)
Dept: FAMILY MEDICINE | Facility: CLINIC | Age: 65
End: 2025-04-08
Payer: COMMERCIAL

## 2025-04-08 NOTE — TELEPHONE ENCOUNTER
Patient Quality Outreach    Patient is due for the following:   Colon Cancer Screening  Physical Preventive Adult Physical      Topic Date Due    Pneumococcal Vaccine (1 of 2 - PCV) Never done    Zoster (Shingles) Vaccine (2 of 2) 12/03/2021    Flu Vaccine (1) 09/01/2024       Action(s) Taken:   Schedule a Adult Preventative    Type of outreach:    Sent letter.    Questions for provider review:    None         Maryse Fajardo CNA  Chart routed to None.

## 2025-04-08 NOTE — LETTER
April 8, 2025      Amanuel Mccord  [address]  [place] [address]            Your team at [facility] cares about your health. We have reviewed your chart and based on our findings; we are making the following recommendations to better manage your health.     You are in particular need of attention regarding the following:     Call or MyChart message your clinic to schedule a colonoscopy, schedule/ a FIT Test, or order a Cologuard test. If you are unsure what type of test you need, please call your clinic and speak to clinic staff.   Colon cancer is now the second leading cause of cancer-related deaths in the [facility] for both men and women and there are over 130,000 new cases and 50,000 deaths per year from colon cancer. Colonoscopies can prevent 90-95% of these deaths. Problem lesions can be removed before they ever become cancer. This test is not only looking for cancer, but also getting rid of precancerous lesions.   If you are under/uninsured, we recommend you contact the [employer] Program.[employer] is a free colorectal cancer screening program that provides colonoscopies for eligible under/uninsured [place] men and women. If you are interested in receiving a free colonoscopy, please call [employer] at t [phone] (mention code [employer]) to see if you're eligible. Please have them send us the results.   PREVENTATIVE VISIT: Physical    If you have already completed these items, please contact the clinic via phone or   [employer] so your care team can review and update your records. Thank you for   choosing [facility] for your healthcare needs. For any questions,   concerns, or to schedule an appointment please contact our clinic.    Healthy Regards,      Your [facility] Care Team

## (undated) DEVICE — SUTURE CAPTURE SYSTEM AND NDL S&N FIRSTPASS 22-4036

## (undated) DEVICE — ESU PENCIL W/HOLSTER

## (undated) DEVICE — TAPE MEDIFIX 4"

## (undated) DEVICE — DRAPE MAYO STAND 23X54 8337

## (undated) DEVICE — SU MONOCRYL 3-0 PS-1 27" Y936H

## (undated) DEVICE — IMM KIT SHOULDER STABILIZATION 7210573

## (undated) DEVICE — GLOVE ESTEEM BLUE W/NEU-THERA 8.0  2D73PB80

## (undated) DEVICE — PACK OPEN SHOULDER SOP15OCFSC

## (undated) DEVICE — IMM KIT SHOULDER TMAX MASK FACE 7210559

## (undated) DEVICE — DRAPE U SPLIT 74X120" 29440

## (undated) DEVICE — DRSG ABDOMINAL 07 1/2X8" 7197D

## (undated) DEVICE — BLADE KNIFE SURG 11 371111

## (undated) DEVICE — BUR STRK SHAVER ARTHRO 4MM BARREL 12 FLUTE 375941012

## (undated) DEVICE — DRAPE STERI U 1015

## (undated) DEVICE — DRSG XEROFORM 1X8"

## (undated) DEVICE — TAPE MICROFOAM 4" 1528-4

## (undated) DEVICE — TUBING SUCTION 12"X1/4" N612

## (undated) DEVICE — NDL SCORPION ARTHREX KNEE AR-13990N

## (undated) DEVICE — ESU CLEANER TIP 31142717

## (undated) DEVICE — DRAPE STERI TOWEL SM 1000

## (undated) DEVICE — PACK SHOULDER LATEX FREE SOP32SPFSE

## (undated) DEVICE — SYR BULB IRRIG DOVER 60 ML LATEX FREE 67000

## (undated) DEVICE — BNDG COBAN 4"X5YDS STERILE

## (undated) DEVICE — ARTHROSCOPIC CANNULA 5.5X70MM GRAY  9718

## (undated) DEVICE — PREP CHLORAPREP 26ML TINTED ORANGE  260815

## (undated) DEVICE — GLOVE ESTEEM BLUE W/NEU-THERA 7.5  2D73PB75

## (undated) DEVICE — TUBING INFLOW CROSSFLOW 0450-000-100

## (undated) DEVICE — GLOVE PROTEXIS BLUE W/NEU-THERA 8.0  2D73EB80

## (undated) DEVICE — DRAPE IOBAN INCISE 13X13" 6640EZ

## (undated) DEVICE — DRSG STERI STRIP 1/2X4" R1547

## (undated) DEVICE — NDL SPINAL 18GA 3.5" 405184

## (undated) DEVICE — SU ETHIBOND 2 V-37 4X30" MX69G

## (undated) DEVICE — DRAPE CONVERTORS U-DRAPE 60X72" 8476

## (undated) DEVICE — BNDG ELASTIC 4" DBL LENGTH UNSTERILE 6611-14

## (undated) DEVICE — GLOVE PROTEXIS W/NEU-THERA 7.5  2D73TE75

## (undated) DEVICE — BLADE KNIFE SURG 15 371115

## (undated) DEVICE — SOL NACL 0.9% IRRIG 3000ML BAG 07972-08

## (undated) DEVICE — SYR 50ML LL W/O NDL 309653

## (undated) DEVICE — ESU ARTHROWAND 90DEG RF AMBIENT SUPER TURBOVAC ASHA4250-01

## (undated) DEVICE — NDL ECLIPSE 18GA 1.5"

## (undated) DEVICE — SU ETHILON 3-0 PS-2 18" 1669H

## (undated) DEVICE — GLOVE PROTEXIS W/NEU-THERA 7.0  2D73TE70

## (undated) DEVICE — CAST PADDING 4" COTTON WEBRIL UNSTERILE 9084

## (undated) RX ORDER — FENTANYL CITRATE 50 UG/ML
INJECTION, SOLUTION INTRAMUSCULAR; INTRAVENOUS
Status: DISPENSED
Start: 2020-02-24

## (undated) RX ORDER — DIMENHYDRINATE 50 MG/ML
INJECTION, SOLUTION INTRAMUSCULAR; INTRAVENOUS
Status: DISPENSED
Start: 2020-02-24

## (undated) RX ORDER — HYDROMORPHONE HYDROCHLORIDE 1 MG/ML
INJECTION, SOLUTION INTRAMUSCULAR; INTRAVENOUS; SUBCUTANEOUS
Status: DISPENSED
Start: 2020-02-24

## (undated) RX ORDER — ONDANSETRON 2 MG/ML
INJECTION INTRAMUSCULAR; INTRAVENOUS
Status: DISPENSED
Start: 2020-02-24

## (undated) RX ORDER — PROPOFOL 10 MG/ML
INJECTION, EMULSION INTRAVENOUS
Status: DISPENSED
Start: 2020-02-24

## (undated) RX ORDER — LIDOCAINE HYDROCHLORIDE AND EPINEPHRINE 10; 10 MG/ML; UG/ML
INJECTION, SOLUTION INFILTRATION; PERINEURAL
Status: DISPENSED
Start: 2020-02-24

## (undated) RX ORDER — LIDOCAINE HYDROCHLORIDE 10 MG/ML
INJECTION, SOLUTION EPIDURAL; INFILTRATION; INTRACAUDAL; PERINEURAL
Status: DISPENSED
Start: 2020-02-24

## (undated) RX ORDER — EPINEPHRINE 1 MG/ML
INJECTION, SOLUTION INTRAMUSCULAR; SUBCUTANEOUS
Status: DISPENSED
Start: 2020-02-24